# Patient Record
Sex: MALE | Race: WHITE | NOT HISPANIC OR LATINO | URBAN - METROPOLITAN AREA
[De-identification: names, ages, dates, MRNs, and addresses within clinical notes are randomized per-mention and may not be internally consistent; named-entity substitution may affect disease eponyms.]

---

## 2018-04-26 ENCOUNTER — INPATIENT (INPATIENT)
Facility: HOSPITAL | Age: 78
LOS: 7 days | Discharge: HOME | DRG: 377 | End: 2018-05-04
Attending: INTERNAL MEDICINE | Admitting: INTERNAL MEDICINE
Payer: MEDICARE

## 2018-04-26 VITALS
OXYGEN SATURATION: 98 % | DIASTOLIC BLOOD PRESSURE: 63 MMHG | HEART RATE: 72 BPM | SYSTOLIC BLOOD PRESSURE: 125 MMHG | TEMPERATURE: 98 F | RESPIRATION RATE: 20 BRPM | HEIGHT: 72 IN | WEIGHT: 160.06 LBS

## 2018-04-26 DIAGNOSIS — Z86.69 PERSONAL HISTORY OF OTHER DISEASES OF THE NERVOUS SYSTEM AND SENSE ORGANS: Chronic | ICD-10-CM

## 2018-04-26 DIAGNOSIS — Z98.890 OTHER SPECIFIED POSTPROCEDURAL STATES: Chronic | ICD-10-CM

## 2018-04-26 DIAGNOSIS — D64.9 ANEMIA, UNSPECIFIED: ICD-10-CM

## 2018-04-26 DIAGNOSIS — Z95.1 PRESENCE OF AORTOCORONARY BYPASS GRAFT: Chronic | ICD-10-CM

## 2018-04-26 DIAGNOSIS — I50.9 HEART FAILURE, UNSPECIFIED: ICD-10-CM

## 2018-04-26 DIAGNOSIS — I48.91 UNSPECIFIED ATRIAL FIBRILLATION: ICD-10-CM

## 2018-04-26 LAB
ALBUMIN SERPL ELPH-MCNC: 3.6 G/DL — SIGNIFICANT CHANGE UP (ref 3.3–5)
ALP SERPL-CCNC: 171 U/L — HIGH (ref 40–120)
ALT FLD-CCNC: 77 U/L — HIGH (ref 10–45)
ANION GAP SERPL CALC-SCNC: 12 MMOL/L — SIGNIFICANT CHANGE UP (ref 5–17)
APTT BLD: 35.5 SEC — SIGNIFICANT CHANGE UP (ref 27.5–37.4)
AST SERPL-CCNC: 49 U/L — HIGH (ref 10–40)
BASOPHILS NFR BLD AUTO: 0.1 % — SIGNIFICANT CHANGE UP (ref 0–2)
BILIRUB SERPL-MCNC: 0.7 MG/DL — SIGNIFICANT CHANGE UP (ref 0.2–1.2)
BUN SERPL-MCNC: 27 MG/DL — HIGH (ref 7–23)
CALCIUM SERPL-MCNC: 8.9 MG/DL — SIGNIFICANT CHANGE UP (ref 8.4–10.5)
CHLORIDE SERPL-SCNC: 102 MMOL/L — SIGNIFICANT CHANGE UP (ref 96–108)
CK MB CFR SERPL CALC: 5.5 NG/ML — SIGNIFICANT CHANGE UP (ref 0–6.7)
CK SERPL-CCNC: 114 U/L — SIGNIFICANT CHANGE UP (ref 30–200)
CO2 SERPL-SCNC: 25 MMOL/L — SIGNIFICANT CHANGE UP (ref 22–31)
CREAT SERPL-MCNC: 0.89 MG/DL — SIGNIFICANT CHANGE UP (ref 0.5–1.3)
EOSINOPHIL NFR BLD AUTO: 0.1 % — SIGNIFICANT CHANGE UP (ref 0–6)
GLUCOSE SERPL-MCNC: 112 MG/DL — HIGH (ref 70–99)
HCT VFR BLD CALC: 22.7 % — LOW (ref 39–50)
HGB BLD-MCNC: 6.8 G/DL — CRITICAL LOW (ref 13–17)
INR BLD: 2.07 — HIGH (ref 0.88–1.16)
LYMPHOCYTES # BLD AUTO: 11.2 % — LOW (ref 13–44)
MCHC RBC-ENTMCNC: 30 G/DL — LOW (ref 32–36)
MCHC RBC-ENTMCNC: 32.5 PG — SIGNIFICANT CHANGE UP (ref 27–34)
MCV RBC AUTO: 108.6 FL — HIGH (ref 80–100)
MONOCYTES NFR BLD AUTO: 9.6 % — SIGNIFICANT CHANGE UP (ref 2–14)
NEUTROPHILS NFR BLD AUTO: 79 % — HIGH (ref 43–77)
NT-PROBNP SERPL-SCNC: 2579 PG/ML — HIGH (ref 0–300)
PLATELET # BLD AUTO: 354 K/UL — SIGNIFICANT CHANGE UP (ref 150–400)
POTASSIUM SERPL-MCNC: 4.2 MMOL/L — SIGNIFICANT CHANGE UP (ref 3.5–5.3)
POTASSIUM SERPL-SCNC: 4.2 MMOL/L — SIGNIFICANT CHANGE UP (ref 3.5–5.3)
PROT SERPL-MCNC: 6 G/DL — SIGNIFICANT CHANGE UP (ref 6–8.3)
PROTHROM AB SERPL-ACNC: 23.3 SEC — HIGH (ref 9.8–12.7)
RBC # BLD: 2.09 M/UL — LOW (ref 4.2–5.8)
RBC # FLD: 22.4 % — HIGH (ref 10.3–16.9)
RH IG SCN BLD-IMP: POSITIVE — SIGNIFICANT CHANGE UP
SODIUM SERPL-SCNC: 139 MMOL/L — SIGNIFICANT CHANGE UP (ref 135–145)
TROPONIN T SERPL-MCNC: 0.02 NG/ML — HIGH (ref 0–0.01)
WBC # BLD: 14.8 K/UL — HIGH (ref 3.8–10.5)
WBC # FLD AUTO: 14.8 K/UL — HIGH (ref 3.8–10.5)

## 2018-04-26 PROCEDURE — 93010 ELECTROCARDIOGRAM REPORT: CPT

## 2018-04-26 PROCEDURE — 99223 1ST HOSP IP/OBS HIGH 75: CPT | Mod: AI

## 2018-04-26 PROCEDURE — 71045 X-RAY EXAM CHEST 1 VIEW: CPT | Mod: 26

## 2018-04-26 PROCEDURE — 99291 CRITICAL CARE FIRST HOUR: CPT

## 2018-04-26 RX ORDER — ATORVASTATIN CALCIUM 80 MG/1
80 TABLET, FILM COATED ORAL AT BEDTIME
Qty: 0 | Refills: 0 | Status: DISCONTINUED | OUTPATIENT
Start: 2018-04-26 | End: 2018-04-30

## 2018-04-26 RX ORDER — FUROSEMIDE 40 MG
40 TABLET ORAL ONCE
Qty: 0 | Refills: 0 | Status: COMPLETED | OUTPATIENT
Start: 2018-04-26 | End: 2018-04-26

## 2018-04-26 RX ORDER — CHOLECALCIFEROL (VITAMIN D3) 125 MCG
1000 CAPSULE ORAL DAILY
Qty: 0 | Refills: 0 | Status: DISCONTINUED | OUTPATIENT
Start: 2018-04-26 | End: 2018-05-04

## 2018-04-26 RX ORDER — RIVAROXABAN 15 MG-20MG
1 KIT ORAL
Qty: 0 | Refills: 0 | COMMUNITY

## 2018-04-26 RX ORDER — TRAVOPROST 0.04 MG/ML
1 SOLUTION/ DROPS OPHTHALMIC
Qty: 0 | Refills: 0 | COMMUNITY

## 2018-04-26 RX ORDER — PANTOPRAZOLE SODIUM 20 MG/1
40 TABLET, DELAYED RELEASE ORAL
Qty: 0 | Refills: 0 | Status: DISCONTINUED | OUTPATIENT
Start: 2018-04-26 | End: 2018-05-04

## 2018-04-26 RX ORDER — METOPROLOL TARTRATE 50 MG
50 TABLET ORAL DAILY
Qty: 0 | Refills: 0 | Status: DISCONTINUED | OUTPATIENT
Start: 2018-04-26 | End: 2018-05-03

## 2018-04-26 RX ORDER — FUROSEMIDE 40 MG
40 TABLET ORAL ONCE
Qty: 0 | Refills: 0 | Status: DISCONTINUED | OUTPATIENT
Start: 2018-04-26 | End: 2018-04-26

## 2018-04-26 RX ORDER — FUROSEMIDE 40 MG
40 TABLET ORAL DAILY
Qty: 0 | Refills: 0 | Status: DISCONTINUED | OUTPATIENT
Start: 2018-04-27 | End: 2018-04-28

## 2018-04-26 RX ORDER — AMIODARONE HYDROCHLORIDE 400 MG/1
200 TABLET ORAL DAILY
Qty: 0 | Refills: 0 | Status: DISCONTINUED | OUTPATIENT
Start: 2018-04-26 | End: 2018-05-04

## 2018-04-26 RX ORDER — TIMOLOL 0.5 %
1 DROPS OPHTHALMIC (EYE)
Qty: 0 | Refills: 0 | COMMUNITY

## 2018-04-26 RX ORDER — LATANOPROST 0.05 MG/ML
1 SOLUTION/ DROPS OPHTHALMIC; TOPICAL AT BEDTIME
Qty: 0 | Refills: 0 | Status: DISCONTINUED | OUTPATIENT
Start: 2018-04-26 | End: 2018-05-04

## 2018-04-26 RX ORDER — TIMOLOL 0.5 %
1 DROPS OPHTHALMIC (EYE)
Qty: 0 | Refills: 0 | Status: DISCONTINUED | OUTPATIENT
Start: 2018-04-26 | End: 2018-05-04

## 2018-04-26 RX ORDER — PREGABALIN 225 MG/1
1000 CAPSULE ORAL DAILY
Qty: 0 | Refills: 0 | Status: DISCONTINUED | OUTPATIENT
Start: 2018-04-26 | End: 2018-05-04

## 2018-04-26 RX ADMIN — Medication 40 MILLIGRAM(S): at 22:47

## 2018-04-26 RX ADMIN — LATANOPROST 1 DROP(S): 0.05 SOLUTION/ DROPS OPHTHALMIC; TOPICAL at 21:49

## 2018-04-26 RX ADMIN — Medication 40 MILLIGRAM(S): at 16:45

## 2018-04-26 RX ADMIN — ATORVASTATIN CALCIUM 80 MILLIGRAM(S): 80 TABLET, FILM COATED ORAL at 21:42

## 2018-04-26 RX ADMIN — PANTOPRAZOLE SODIUM 40 MILLIGRAM(S): 20 TABLET, DELAYED RELEASE ORAL at 21:42

## 2018-04-26 RX ADMIN — Medication 1 DROP(S): at 21:42

## 2018-04-26 NOTE — ED PROVIDER NOTE - PMH
Atrial fibrillation and flutter    CAD (coronary artery disease)    Detached retina, right    History of lung cancer    Myasthenia gravis

## 2018-04-26 NOTE — H&P ADULT - NSHPLABSRESULTS_GEN_ALL_CORE
6.8    14.8  )-----------( 354      ( 26 Apr 2018 15:34 )             22.7  04-26    139  |  102  |  27<H>  ----------------------------<  112<H>  4.2   |  25  |  0.89    Ca    8.9      26 Apr 2018 15:34    TPro  6.0  /  Alb  3.6  /  TBili  0.7  /  DBili  x   /  AST  49<H>  /  ALT  77<H>  /  AlkPhos  171<H>  04-26

## 2018-04-26 NOTE — H&P ADULT - PROBLEM SELECTOR PLAN 3
rate controlled  holding xarelto given severe anemia  and need for w/up  continue BB with holding parameters to hold for SBP <110   EP to see in am   initial plan was for possible ablation but in setting of severe anemia will need w/up first

## 2018-04-26 NOTE — H&P ADULT - HISTORY OF PRESENT ILLNESS
78 yo M with history of CAD, myasthenia gravis, history of atrial fibrillation on Xarelto and Amiodarone, s/p CABG at [facility] 3/27/18 ( LIMA> LAD, ALY>RI, SVG>PDA), resent admission on 4/10/18 for SOB, was sent to ED form his PMD office with newly diagnosed atrial flutter.  Patient states that for the past 3 week he has been experiencing SOB, fatigue and decreased exercise tolerance.   Patient states that he has been taking his medication Xarelto and amiodarone without any issues.

## 2018-04-26 NOTE — ED ADULT NURSE REASSESSMENT NOTE - NS ED NURSE REASSESS COMMENT FT1
Pt transported to [facility] without incident, bedside handoff to RN Sarah, no s/s of transfusion reaction noted.

## 2018-04-26 NOTE — ED ADULT TRIAGE NOTE - CHIEF COMPLAINT QUOTE
patient BIBA from physicians office. s/p triple bypass 3 weeks ago. complains fo SOB for the last 3 weeks. patient sent for possible cardioversion

## 2018-04-26 NOTE — PATIENT PROFILE ADULT. - FUNCTIONAL SCREEN CURRENT LEVEL: AMBULATION, MLM
+ fever, + chills, no change in vision, no change in hearing, no chest pain, no shortness of breath, + abdominal pain, no vomiting, no dysuria, no muscle pain, no rashes, no loss of consciousness. ~ Javi Powell MD
(2) assistive person

## 2018-04-26 NOTE — H&P ADULT - PROBLEM SELECTOR PLAN 2
F/UP echo here  s/p recent 3vCABG 3/27/18 , CP free  continue BB and statin but hold asa given severe anemia  pre-CABG and post CABG EF 60-65%, most recent echo 4/11/18 as outpt 43%  lasix 40mg IV daily  and in between of RBC tx units, reassess dose lasix in am

## 2018-04-26 NOTE — H&P ADULT - ASSESSMENT
76 y/o man with hx lung Ca s/p RML resection (several years ago, [place] [facility] [place] as per patient),  s/p recent CABG at [facility] 3/27/18 (LIMA-LAD, ALY-RI, SVG-PDA) with uneventful course, since surgery has been with progressive dyspnea, generalized weakness and LE edema - had  recent admission to [facility] for above with CTA chest negative for PE, echo with EF 60-65%,  treated with lasix, discharged on 4/9/18 with H/H at that time  9.2/28.6.  On 4/10/18 noted to be A fib and was started on xarelto and amiodarone.  Today  sent from cardiologist office to [facility] for A flutter and worsened LE edema and dyspnea, found to be severely anemic with Hgb  6.2/22.7, VSS with /59,. HR 60-70's, RR 18, O2 sat 98% RA,  EKG with A flutter VR 64, troponin 0.02, BNP  2,579  and fluid overloaded on exam with mostly LE edema.  In ED treated with Lasix 40mg IVP x 1 with good UO and started RBC blood transfusion ( ordered for two units  with IV lasix in between).  Patient is now admitted to 5U telemetry for further w/up and management including blood transfusion, ECHO/BRIAN, GI eval and EP eval with possible CTS eval (chest CT ordered to R/O bleed). 78 y/o man with hx lung Ca s/p RML [place] ago, [place] [facility] [place]  s/p recent CABG at [facility] 3/27/18 (LIMA-LAD, ALY-RI, SVG-PDA) with uneventful course, since surgery has been with progressive dyspnea, generalized weakness and LE edema - had  recent admission to [facility] for above with CTA chest negative for PE, echo with EF 60-65%,  treated with lasix, discharged on 4/9/18 with H/H at that time  9.2/28.6.  On 4/10/18 noted to be A fib and was started on xarelto and amiodarone.  Today  sent from cardiologist office to [facility] ED for A flutter and worsened LE edema and dyspnea, found to be severely anemic with Hgb  6.2/22.7, VSS with /59,. HR 60-70's, RR 18, O2 sat 98% RA,  EKG with A flutter VR 64, troponin 0.02, BNP  2,579  and fluid overloaded on exam with mostly LE edema.  In ED treated with Lasix 40mg IVP x 1 with good UO and started RBC blood transfusion ( ordered for two units  with IV lasix in between).  Patient is now admitted to 5U telemetry for further w/up and management including blood transfusion, ECHO/BRIAN, GI eval and EP eval with possible CTS eval (chest CT ordered to R/O bleed).  At present remains HD stable. 78 y/o man with hx lung Ca s/p RML resection (several years ago, followed at [facility], in remission as per patient),  s/p recent CABG at [facility] 3/27/18 (LIMA-LAD, ALY-RI, SVG-PDA) with uneventful course, since surgery has been with progressive dyspnea, generalized weakness and LE edema - had  recent admission to [facility] for above with CTA chest negative for PE, echo with EF 60-65%,  treated with lasix, discharged on 4/9/18 with H/H at that time  9.2/28.6.  On 4/10/18 noted to be A fib and was started on xarelto and amiodarone.  Today  sent from cardiologist office to [facility] ED for A flutter and worsened LE edema and dyspnea, found to be severely anemic with Hgb  6.2/22.7, VSS with /59,. HR 60-70's, RR 18, O2 sat 98% RA,  EKG with A flutter VR 64, troponin 0.02, BNP  2,579  and fluid overloaded on exam with mostly LE edema.  In ED treated with Lasix 40mg IVP x 1 with good UO and started RBC blood transfusion ( ordered for two units  with IV lasix in between).  Patient is now admitted to 5U telemetry for further w/up and management including blood transfusion, ECHO/BRIAN, GI eval and EP eval with possible CTS eval (chest CT ordered to R/O bleed).  At present remains HD stable.

## 2018-04-26 NOTE — H&P ADULT - HISTORY OF PRESENT ILLNESS
76 y/o man with hx lung Ca s/p RML resection (several years ago, followed at [facility] [place], in remission as per patient), hx myasthenia gravis with eye weakness, s/p recent CABG at [facility] 3/27/18 (ALY-LAD, LIMA-RI, SVG-PDA) with uneventful course, since surgery has been with progressive dyspnea, generalized weakness and LE edema - had  recent admission to [facility] for above with CTA chest negative for PE, echo with EF 60-65%, treated with lasix, discharged on 4/9/18 with H/H at that time  9.2/28.6.  On 4/10/18 noted to be A fib and was startedon xarelto and amiodarone.  Today  sent from cardiologist office to [facility] for A flutter and worsened LE edema and dyspnea 78 y/o man with hx lung Ca s/p RML resection (several years ago, followed at [facility] [place], in remission as per patient),  s/p recent CABG at [facility] 3/27/18 (LIMA-LAD, ALY-RI, SVG-PDA) with uneventful course, since surgery has been with progressive dyspnea, generalized weakness and LE edema - had  recent admission to [facility] for above with CTA chest negative for PE, echo with EF 60-65%,  treated with lasix, discharged on 4/9/18 with H/H at that time  9.2/28.6.  On 4/10/18 noted to be A fib and was started on xarelto and amiodarone.  Today  sent from cardiologist office to [facility] for A flutter and worsened LE edema and dyspnea, found to be severely anemic with Hgb  6.2/22.7 76 y/o man with hx lung Ca s/p RML resection (several years ago, followed at [facility] [place], in remission as per patient),  s/p recent CABG at [facility] 3/27/18 (LIMA-LAD, ALY-RI, SVG-PDA) with uneventful course, since surgery has been with progressive dyspnea, generalized weakness and LE edema - had  recent admission to [facility] for above with CTA chest negative for PE, echo with EF 60-65%,  treated with lasix, discharged on 4/9/18 with H/H at that time  9.2/28.6.  On 4/10/18 noted to be A fib and was started on xarelto and amiodarone.  Today  sent from cardiologist office to [facility] for A flutter and worsened LE edema and dyspnea, found to be severely anemic with Hgb  6.2/22.7, VSS with /59,. HR 60-70's, RR 18, O2 sat 98% RA,  EKG with A flutter VR 64, troponin 0.02, BNP  2,579  and fluid overloaded on exam with mostly LE edema.  In ED treated with Lasix 40mg IVP x 1 with good UO and started RBC blood transfusion ( ordered for two units  with IV lasix in between).  Patient is now admitted to 5U telemetry for further w/up and management including blood transfusion, ECHO/BRIAN, GI eval and EP eval with possible CTS eval (chest CT ordered to R/O bleed).     As per patient since CABG with progressive SOB, LE edema, decreased ET  and palpitations.  Denies melena, RBPR, hematochezia, hematemesis.  No prior hx GIB, last colonoscopy with poor prep 5 years ago, prior to that unremarkable as per patient.  Denies any hx A fib prior to CABG and states was started on xarelto after discharge from hospital. 76 y/o man with hx lung Ca s/p RML resection (several [place] ago, [place] [facility] [place]),  s/p recent CABG at [facility] 3/27/18 (LIMA-LAD, ALY-RI, SVG-PDA) with uneventful course, since surgery has been with progressive dyspnea, generalized weakness and LE edema - had  recent admission to [facility] for above with CTA chest negative for PE, echo with EF 60-65%,  treated with lasix, discharged on 4/9/18 with H/H at that time  9.2/28.6.  On 4/10/18 noted to be A fib and was started on xarelto and amiodarone.  Today  sent from cardiologist office to [facility] for A flutter and worsened LE edema and dyspnea, found to be severely anemic with Hgb  6.2/22.7, VSS with /59,. HR 60-70's, RR 18, O2 sat 98% RA,  EKG with A flutter VR 64, troponin 0.02, BNP  2,579  and fluid overloaded on exam with mostly LE edema.  In ED treated with Lasix 40mg IVP x 1 with good UO and started RBC blood transfusion ( ordered for two units  with IV lasix in between).  Patient is now admitted to 5U telemetry for further w/up and management including blood transfusion, ECHO/BRIAN, GI eval and EP eval with possible CTS eval (chest CT ordered to R/O bleed).     As per patient since CABG with progressive SOB, LE edema, decreased ET  and palpitations.  Denies melena, RBPR, hematochezia, hematemesis.  No prior hx GIB, last colonoscopy with poor prep 5 years ago, prior to that unremarkable as per patient.  Denies any hx A fib prior to CABG and states was started on xarelto after discharge from hospital.  Pre-CABG and post CABG ECHO with EF 60-65%, most recent outpt echo  4/11/18 with EF 43%. 76 y/o man with hx lung Ca s/p RML resection [place] ago, [place] [facility] [place] as [place]),  s/p recent CABG at [facility] 3/27/18 (LIMA-LAD, ALY-RI, SVG-PDA) with uneventful course, since surgery has been with progressive dyspnea, generalized weakness and LE edema - had  recent admission to [facility] for above with CTA chest negative for PE, echo with EF 60-65%,  treated with lasix, discharged on 4/9/18 with H/H at that time  9.2/28.6.  On 4/10/18 noted to be A fib and was started on xarelto and amiodarone.  Today  sent from cardiologist office to [facility] ED for A flutter and worsened LE edema and dyspnea, found to be severely anemic with Hgb  6.2/22.7, VSS with /59,. HR 60-70's, RR 18, O2 sat 98% RA,  EKG with A flutter VR 64, troponin 0.02, BNP  2,579  and fluid overloaded on exam with mostly LE edema.  In ED treated with Lasix 40mg IVP x 1 with good UO and started RBC blood transfusion ( ordered for two units  with IV lasix in between).  Patient is now admitted to 5U telemetry for further w/up and management including blood transfusion, ECHO/BRIAN, GI eval and EP eval with possible CTS eval (chest CT ordered to R/O bleed).     As per patient since CABG with progressive SOB, LE edema, decreased ET  and palpitations.  Denies melena, RBPR, hematochezia, hematemesis.  No prior hx GIB, last colonoscopy with poor prep 5 years ago, prior to that unremarkable as per patient.  Denies any hx A fib prior to CABG and states was started on xarelto after discharge from hospital.  Pre-CABG and post CABG ECHO with EF 60-65%, most recent outpt echo  4/11/18 with EF 43%. 78 y/o man with hx lung Ca s/p RML resection (several years ago, followed at [facility], in remission as per patient),  s/p recent CABG at [facility] 3/27/18 (LIMA-LAD, ALY-RI, SVG-PDA) with uneventful course, since surgery has been with progressive dyspnea, generalized weakness and LE edema - had  recent admission to [facility] for above with CTA chest negative for PE, echo with EF 60-65%,  treated with lasix, discharged on 4/9/18 with H/H at that time  9.2/28.6.  On 4/10/18 noted to be A fib and was started on xarelto and amiodarone.  Today  sent from cardiologist office to [facility] ED for A flutter and worsened LE edema and dyspnea, found to be severely anemic with Hgb  6.2/22.7, VSS with /59,. HR 60-70's, RR 18, O2 sat 98% RA,  EKG with A flutter VR 64, troponin 0.02, BNP  2,579  and fluid overloaded on exam with mostly LE edema.  In ED treated with Lasix 40mg IVP x 1 with good UO and started RBC blood transfusion ( ordered for two units  with IV lasix in between).  Patient is now admitted to 5U telemetry for further w/up and management including blood transfusion, ECHO/BRIAN, GI eval and EP eval with possible CTS eval (chest CT ordered to R/O bleed).     As per patient since CABG with progressive SOB, LE edema, decreased ET  and palpitations.  Denies melena, RBPR, hematochezia, hematemesis.  No prior hx GIB, last colonoscopy with poor prep 5 years ago, prior to that unremarkable as per patient.  Denies any hx A fib prior to CABG and states was started on xarelto after discharge from hospital.  Pre-CABG and post CABG ECHO with EF 60-65%, most recent outpt echo  4/11/18 with EF 43%.

## 2018-04-26 NOTE — ED ADULT NURSE REASSESSMENT NOTE - NS ED NURSE REASSESS COMMENT FT1
SKYLER Hardy at bedside. Pt educated to report what s/s of blood transfusion reaction to report to nursing staff, he verbalized understanding.

## 2018-04-26 NOTE — ED PROVIDER NOTE - MEDICAL DECISION MAKING DETAILS
78 y/o male s/p CABG presents for SOB and CP. 1) elevated BNP, LE edema given Lasix 40 mg IV, normal EF based on report from [facility] 2) A barbara, pt on Xeralto, plan is for BRIAN 3) Pt found to be anemic- will order transfusion of PRBCs in the ED. Discussed with cardiology team 78 y/o male s/p CABG presents for SOB and CP. 1) elevated BNP, LE edema given Lasix 40 mg IV, normal EF based on report from [facility] 2) A flutter, pt on Xeralto, plan is for BRIAN 3) Pt found to be anemic- will order transfusion of PRBCs in the ED. Discussed with cardiology team to hold Xaralto, GI consult

## 2018-04-26 NOTE — H&P ADULT - ATTENDING COMMENTS
Assessment: Patient personally seen and examined myself during rounds with the Physician Assistant/House Staff/Nurse Practitioner   on 4/26/18  Physician Assistant/House Staff/Nurse Practitioner note read, including vitals, physical findings, laboratory data, and radiological reports.   Revisions included below.   Direct personal management at bed side and extensive interpretation of the data.    Plan was outlined and discussed in details with the Physician Assistant/House Staff/Nurse Practitioner.    Decision making of high complexity   Risk high of complications, morbidity, and/or mortality  Assessment and Action taken for acute disease activity to reflect the level of care provided:  -Hemodynamic evaluation and support - severely anemic needs more prbc   -ACS assessment and treatment as applicable   -Heart failure assessment and treatment as applicable  -Cardiac Telemetry reviewed - atrial flutter  -Medication reconciliation  -Review laboratory data - anemia  -EKG reviewed  aflutter  plan for transfusion gi consult and ablation/dccv per ep  -Interdisciplinary discussion with IC / EP / HF / CTS teams as needed  TIME SPENT in evaluation and management, reassessments, review and interpretation of labs and x-rays, and hemodynamic management, formulating a plan and coordinating care: ___70____ MIN.  Time does not include procedural time.

## 2018-04-26 NOTE — H&P ADULT - PSH
H/O cataract    History of lung surgery    S/P CABG x 3
H/O cataract    History of lung surgery    S/P CABG x 3

## 2018-04-26 NOTE — H&P ADULT - PMH
Atrial fibrillation and flutter    CAD (coronary artery disease)    Detached retina, right    History of lung cancer    Myasthenia gravis
Atrial fibrillation and flutter    CAD (coronary artery disease)    Detached retina, right    History of lung cancer    Myasthenia gravis

## 2018-04-26 NOTE — H&P ADULT - NSHPPHYSICALEXAM_GEN_ALL_CORE
127/59  HR 70's  RR 18 O2 sat 98% RA  NAD  appears pale and chronically ill    Neck: No JVD  Chest ; bibasilar cracles  Cor S1 S2 irreg irreg, no murmur appreciated  Abd: soft, NT, ND  Ext: LE 2-3+ pitting edema both LE  DP/PT 1+ b/l   Neuro: A+O x 3, fluent speech, non focal

## 2018-04-26 NOTE — ED ADULT NURSE REASSESSMENT NOTE - NS ED NURSE REASSESS COMMENT FT1
Lab results noted. Second IV access placed, Type and screen collected, pt consented for blood transfusion by Dr Alberto.

## 2018-04-26 NOTE — H&P ADULT - PROBLEM SELECTOR PLAN 1
HOLD ASA and xarelto  unclear source at present, stool guaic in ED weakly +  F/UP serial occult stools on floor  GI eval in am   started on empiric PPI BID   F/UP chest CT to r/o internal post op bleed on xarelto

## 2018-04-26 NOTE — ED PROVIDER NOTE - CRITICAL CARE PROVIDED
documentation/consultation with other physicians/additional history taking/direct patient care (not related to procedure)/interpretation of diagnostic studies

## 2018-04-26 NOTE — ED PROVIDER NOTE - OBJECTIVE STATEMENT
78 y/o male with hx paroxysmal a-fib , s/p 3 weeks ago cardiac bypass, with SOB and CP since surgery. Today in cardiologist office found to be in a-flutter. Patient st. also that he has been feeling tired and exhausted. No fever. St increase in LE edema

## 2018-04-26 NOTE — ED ADULT NURSE NOTE - OBJECTIVE STATEMENT
Pt w hx of AFib, s/p coronary bypass three weeks ago at [facility] referred to ED by his cardiologist for a possible cardioversion. Pt c/o GOODWIN, fatigue and B/L LE swelling. He denies chest pain or SOB at rest, surg incision CDI, pt denies any drainage, fevers or chills. Pt placed on telemetry, ekg obtained. IV access established, labs sent.

## 2018-04-27 DIAGNOSIS — I50.31 ACUTE DIASTOLIC (CONGESTIVE) HEART FAILURE: ICD-10-CM

## 2018-04-27 DIAGNOSIS — I25.10 ATHEROSCLEROTIC HEART DISEASE OF NATIVE CORONARY ARTERY WITHOUT ANGINA PECTORIS: ICD-10-CM

## 2018-04-27 DIAGNOSIS — D72.829 ELEVATED WHITE BLOOD CELL COUNT, UNSPECIFIED: ICD-10-CM

## 2018-04-27 DIAGNOSIS — K92.1 MELENA: ICD-10-CM

## 2018-04-27 DIAGNOSIS — R74.8 ABNORMAL LEVELS OF OTHER SERUM ENZYMES: ICD-10-CM

## 2018-04-27 DIAGNOSIS — I10 ESSENTIAL (PRIMARY) HYPERTENSION: ICD-10-CM

## 2018-04-27 DIAGNOSIS — Z01.89 ENCOUNTER FOR OTHER SPECIFIED SPECIAL EXAMINATIONS: ICD-10-CM

## 2018-04-27 LAB
ANION GAP SERPL CALC-SCNC: 11 MMOL/L — SIGNIFICANT CHANGE UP (ref 5–17)
APPEARANCE UR: CLEAR — SIGNIFICANT CHANGE UP
APTT BLD: 32.2 SEC — SIGNIFICANT CHANGE UP (ref 27.5–37.4)
BILIRUB UR-MCNC: NEGATIVE — SIGNIFICANT CHANGE UP
BUN SERPL-MCNC: 28 MG/DL — HIGH (ref 7–23)
CALCIUM SERPL-MCNC: 8.8 MG/DL — SIGNIFICANT CHANGE UP (ref 8.4–10.5)
CHLORIDE SERPL-SCNC: 99 MMOL/L — SIGNIFICANT CHANGE UP (ref 96–108)
CO2 SERPL-SCNC: 29 MMOL/L — SIGNIFICANT CHANGE UP (ref 22–31)
COLOR SPEC: YELLOW — SIGNIFICANT CHANGE UP
CREAT SERPL-MCNC: 1.05 MG/DL — SIGNIFICANT CHANGE UP (ref 0.5–1.3)
DIFF PNL FLD: (no result)
GLUCOSE SERPL-MCNC: 109 MG/DL — HIGH (ref 70–99)
GLUCOSE UR QL: NEGATIVE — SIGNIFICANT CHANGE UP
HCT VFR BLD CALC: 26.3 % — LOW (ref 39–50)
HCT VFR BLD CALC: 26.6 % — LOW (ref 39–50)
HCT VFR BLD CALC: 28.4 % — LOW (ref 39–50)
HGB BLD-MCNC: 8.5 G/DL — LOW (ref 13–17)
HGB BLD-MCNC: 8.5 G/DL — LOW (ref 13–17)
HGB BLD-MCNC: 9 G/DL — LOW (ref 13–17)
INR BLD: 1.49 — HIGH (ref 0.88–1.16)
INR BLD: 1.65 — HIGH (ref 0.88–1.16)
IRON SATN MFR SERPL: 18 % — SIGNIFICANT CHANGE UP (ref 16–55)
IRON SATN MFR SERPL: 59 UG/DL — SIGNIFICANT CHANGE UP (ref 45–165)
KETONES UR-MCNC: NEGATIVE — SIGNIFICANT CHANGE UP
LACTATE SERPL-SCNC: 1.4 MMOL/L — SIGNIFICANT CHANGE UP (ref 0.5–2)
LEUKOCYTE ESTERASE UR-ACNC: NEGATIVE — SIGNIFICANT CHANGE UP
MAGNESIUM SERPL-MCNC: 2.2 MG/DL — SIGNIFICANT CHANGE UP (ref 1.6–2.6)
MCHC RBC-ENTMCNC: 30.6 PG — SIGNIFICANT CHANGE UP (ref 27–34)
MCHC RBC-ENTMCNC: 31 PG — SIGNIFICANT CHANGE UP (ref 27–34)
MCHC RBC-ENTMCNC: 31.3 PG — SIGNIFICANT CHANGE UP (ref 27–34)
MCHC RBC-ENTMCNC: 31.7 G/DL — LOW (ref 32–36)
MCHC RBC-ENTMCNC: 32 G/DL — SIGNIFICANT CHANGE UP (ref 32–36)
MCHC RBC-ENTMCNC: 32.3 G/DL — SIGNIFICANT CHANGE UP (ref 32–36)
MCV RBC AUTO: 96 FL — SIGNIFICANT CHANGE UP (ref 80–100)
MCV RBC AUTO: 96.6 FL — SIGNIFICANT CHANGE UP (ref 80–100)
MCV RBC AUTO: 97.8 FL — SIGNIFICANT CHANGE UP (ref 80–100)
NITRITE UR-MCNC: POSITIVE
OB PNL STL: POSITIVE
PH UR: 5 — SIGNIFICANT CHANGE UP (ref 5–8)
PLATELET # BLD AUTO: 276 K/UL — SIGNIFICANT CHANGE UP (ref 150–400)
PLATELET # BLD AUTO: 288 K/UL — SIGNIFICANT CHANGE UP (ref 150–400)
PLATELET # BLD AUTO: 289 K/UL — SIGNIFICANT CHANGE UP (ref 150–400)
POTASSIUM SERPL-MCNC: 3.6 MMOL/L — SIGNIFICANT CHANGE UP (ref 3.5–5.3)
POTASSIUM SERPL-SCNC: 3.6 MMOL/L — SIGNIFICANT CHANGE UP (ref 3.5–5.3)
PROT UR-MCNC: NEGATIVE MG/DL — SIGNIFICANT CHANGE UP
PROTHROM AB SERPL-ACNC: 16.7 SEC — HIGH (ref 9.8–12.7)
PROTHROM AB SERPL-ACNC: 18.5 SEC — HIGH (ref 9.8–12.7)
RBC # BLD: 2.72 M/UL — LOW (ref 4.2–5.8)
RBC # BLD: 2.74 M/UL — LOW (ref 4.2–5.8)
RBC # BLD: 2.94 M/UL — LOW (ref 4.2–5.8)
RBC # BLD: 2.94 M/UL — LOW (ref 4.2–5.8)
RBC # FLD: 23.6 % — HIGH (ref 10.3–16.9)
RBC # FLD: 23.6 % — HIGH (ref 10.3–16.9)
RBC # FLD: 23.7 % — HIGH (ref 10.3–16.9)
RETICS/RBC NFR: 13.8 % — HIGH (ref 0.5–2.5)
SODIUM SERPL-SCNC: 139 MMOL/L — SIGNIFICANT CHANGE UP (ref 135–145)
SP GR SPEC: 1.01 — SIGNIFICANT CHANGE UP (ref 1–1.03)
TIBC SERPL-MCNC: 332 UG/DL — SIGNIFICANT CHANGE UP (ref 220–430)
TROPONIN T SERPL-MCNC: 0.03 NG/ML — HIGH (ref 0–0.01)
UIBC SERPL-MCNC: 273 UG/DL — SIGNIFICANT CHANGE UP (ref 110–370)
UROBILINOGEN FLD QL: 0.2 E.U./DL — SIGNIFICANT CHANGE UP
WBC # BLD: 11.3 K/UL — HIGH (ref 3.8–10.5)
WBC # BLD: 11.6 K/UL — HIGH (ref 3.8–10.5)
WBC # BLD: 13 K/UL — HIGH (ref 3.8–10.5)
WBC # FLD AUTO: 11.3 K/UL — HIGH (ref 3.8–10.5)
WBC # FLD AUTO: 11.6 K/UL — HIGH (ref 3.8–10.5)
WBC # FLD AUTO: 13 K/UL — HIGH (ref 3.8–10.5)

## 2018-04-27 PROCEDURE — 93306 TTE W/DOPPLER COMPLETE: CPT | Mod: 26

## 2018-04-27 PROCEDURE — 93970 EXTREMITY STUDY: CPT | Mod: 26

## 2018-04-27 PROCEDURE — 99233 SBSQ HOSP IP/OBS HIGH 50: CPT

## 2018-04-27 PROCEDURE — 71250 CT THORAX DX C-: CPT | Mod: 26

## 2018-04-27 PROCEDURE — 99222 1ST HOSP IP/OBS MODERATE 55: CPT

## 2018-04-27 RX ORDER — POTASSIUM CHLORIDE 20 MEQ
40 PACKET (EA) ORAL ONCE
Qty: 0 | Refills: 0 | Status: COMPLETED | OUTPATIENT
Start: 2018-04-27 | End: 2018-04-27

## 2018-04-27 RX ORDER — SOD SULF/SODIUM/NAHCO3/KCL/PEG
4000 SOLUTION, RECONSTITUTED, ORAL ORAL ONCE
Qty: 0 | Refills: 0 | Status: COMPLETED | OUTPATIENT
Start: 2018-04-29 | End: 2018-04-29

## 2018-04-27 RX ADMIN — PANTOPRAZOLE SODIUM 40 MILLIGRAM(S): 20 TABLET, DELAYED RELEASE ORAL at 05:45

## 2018-04-27 RX ADMIN — AMIODARONE HYDROCHLORIDE 200 MILLIGRAM(S): 400 TABLET ORAL at 05:45

## 2018-04-27 RX ADMIN — Medication 1000 UNIT(S): at 12:34

## 2018-04-27 RX ADMIN — Medication 1 DROP(S): at 19:08

## 2018-04-27 RX ADMIN — Medication 40 MILLIGRAM(S): at 05:45

## 2018-04-27 RX ADMIN — Medication 50 MILLIGRAM(S): at 05:45

## 2018-04-27 RX ADMIN — PANTOPRAZOLE SODIUM 40 MILLIGRAM(S): 20 TABLET, DELAYED RELEASE ORAL at 19:03

## 2018-04-27 RX ADMIN — ATORVASTATIN CALCIUM 80 MILLIGRAM(S): 80 TABLET, FILM COATED ORAL at 21:39

## 2018-04-27 RX ADMIN — PREGABALIN 1000 MICROGRAM(S): 225 CAPSULE ORAL at 12:34

## 2018-04-27 RX ADMIN — Medication 40 MILLIEQUIVALENT(S): at 12:33

## 2018-04-27 NOTE — PROGRESS NOTE ADULT - SUBJECTIVE AND OBJECTIVE BOX
PA EVENT NOTE  EGD canceled this evening by anesthesiologist Dr. Diaz without consultation with cardiologist Dr. Bonilla. Cardiologist clearance document in chart pre op at 13:30. Dr. Bonilla made multiple attempts to contact anesthesiologist without return. Case discussed with GI fellow but not GI attending. Patient remains cardiac cleared for EGD/Colonoscopy as risk of bleeding outweighs low risk of MAC due to low RCRI and CHAVEZ score.

## 2018-04-27 NOTE — CONSULT NOTE ADULT - PROBLEM SELECTOR RECOMMENDATION 2
normal LFTs in the past, likely in setting of recent Lipitor use vs chronic alcohol use. So sign of biliary obstruction.   Advised alcohol cessation.  Consider lower Lipitor from 80mg to 40mg due advanced age and if appropriate for his cardiac risk reduction.  Recheck LFTs. normal LFTs in the past, likely in setting of recent Lipitor use vs chronic alcohol use. No sign of biliary obstruction.   Advised alcohol cessation.  Consider lower Lipitor from 80mg to 40mg due advanced age and if appropriate for his cardiac risk reduction.  Recheck LFTs.  If LFTs persistently elevated, may consider further CLD work-up.

## 2018-04-27 NOTE — PROGRESS NOTE ADULT - PROBLEM SELECTOR PLAN 3
rate controlled  holding xarelto given severe anemia  and need for w/up  continue BB with holding parameters to hold for SBP <110   EP to see in am   initial plan was for possible ablation but in setting of severe anemia will need w/up first -EP team following   -New onset Atrial Flutter diagnosed 4/11/2018 as an outpatient. Was started on Xarelto and Amiodarone at that time.  -Holding Xarelto in the setting of acute anemia  -Currently rate controlled: Will continue Amiodarone 200 mg daily and Toprol Xl 50 mg daily.   - When patient is cleared for anticoagulation will plan for BRIAN/DCCV before discharge.

## 2018-04-27 NOTE — PROGRESS NOTE ADULT - PROBLEM SELECTOR PLAN 7
-Lives with his wife. reports he is in cardiac rehab.   -PT evaluation ordered    Dispo: EGD today. F/U CBC and Coags @ 6 PM.     Case discussed with EP, GI team and Dr. Bonilla. -Lives with his wife. reports he is in cardiac rehab.   -PT evaluation ordered    Dispo: F/U CBC and Coags @ 6 PM. F/U with GI when EGD and C-Scope will be done.       Case discussed with EP, GI team and Dr. Bonilla. -Lives with his wife. reports he is in cardiac rehab.   -PT evaluation ordered    Dispo: F/U CBC and Coags @ 6 PM. Will need clear liquid diet (Sunday; will need to change order in [place]) and [place] at 6 PM Sunday.       Case discussed with EP, GI team and Dr. Bonilla.

## 2018-04-27 NOTE — PROVIDER CONTACT NOTE (OTHER) - ASSESSMENT
Pt. alert and oriented. Pt. had small, dark brown soft stool. Pt. alert and oriented. Pt. had small, dark brown soft and slightly watery stool.

## 2018-04-27 NOTE — CONSULT NOTE ADULT - PROBLEM SELECTOR RECOMMENDATION 9
Likely upper GI bleed in setting of Xarelto use. Acute drop in hb to 6.9 from 9.2 1 month ago. Elevated BUN.  Macro/normocytic anemia consistent with acute bleed, maybe mixed component of baseline anemia of chronic disease. Reticulocyte index of 6.2 showed appropriate response. Iron panel normal however was drawn after 2 units of pRBC.  Would continue with protonix IV BID. Would warrant EGD, possible C-Scope.  CBC Q12H, transfuse for <8.  Hold off Xarelto and ASA for now.  Advised alcohol cessation.  Will discuss with attending. Likely upper GI bleed in setting of Xarelto use. Acute drop in hb to 6.9 from 9.2 1 month ago. Elevated BUN.  Macro/normocytic anemia consistent with acute bleed, maybe mixed component of baseline anemia of chronic disease. Reticulocyte index of 6.2 showed appropriate response. Iron panel normal however was drawn after 2 units of pRBC.  Would continue with protonix IV BID. Would warrant EGD, possible C-Scope.  CBC Q6-8H, transfuse for <8.  Hold off Xarelto and ASA for now.  Advised alcohol cessation.  Will discuss with attending. Likely upper GI bleed in setting of Xarelto use. Acute drop in hb to 6.9 from 9.2 1 month ago. Elevated BUN.  Macro/normocytic anemia consistent with acute bleed, maybe mixed component of baseline anemia of chronic disease. Reticulocyte index of 6.2 showed appropriate response. Iron panel normal however was drawn after 2 units of pRBC.  Would continue with protonix IV BID. Would warrant inpatient EGD, possible C-Scope.  CBC Q4-6H, transfuse for <8.  Hold off Xarelto and ASA for now.  Patient possibly in CHF exacerbation, would need cardiac clearance prior to EGD.  Advised alcohol cessation.  Will discuss with attending. Likely upper GI bleed in setting of Xarelto use. Acute drop in hb to 6.9 from 9.2 1 month ago. Elevated BUN.  Macro/normocytic anemia consistent with acute bleed, maybe mixed component of baseline anemia of chronic disease. Reticulocyte index of 6.2 showed appropriate response. Iron panel normal however was drawn after 2 units of pRBC.  Would continue with protonix IV BID. Would warrant inpatient EGD, possible C-Scope.  CBC Q4-6H, transfuse for <8.  Trend Coags.  Hold off Xarelto and ASA for now.  Patient possibly in CHF exacerbation, would need cardiac clearance prior to EGD.  Advised alcohol cessation.  Will discuss with attending. Likely upper GI bleed in setting of Xarelto use 2/2 AVM vs PUD vs malignancy vs LGIB. Acute drop in hb to 6.9 from 9.2 1 month ago. Elevated BUN.  Macro/normocytic anemia consistent with acute bleed, maybe mixed component of baseline anemia of chronic disease. Reticulocyte index of 6.2 showed appropriate response. Iron panel normal however was drawn after 2 units of pRBC.  Would continue with protonix IV BID.   Will plan for EGD today pending cardiac optimization  Pt will likely also need a colonoscopy for further evaluation and GI optimization prior to anticoagulation use as his last colonoscopy was with poor prep  CBC Q4-6H, transfuse for <8.  Re-check PT INR, last Xarelto use was evening before admission  Continue to hold Xarelto in the setting of GI bleeding    Will discuss with attending.

## 2018-04-27 NOTE — CHART NOTE - NSCHARTNOTEFT_GEN_A_CORE
Pt was initially planned for EGD, however, EGD was canceled d/t scheduling and anesthetic concerns. Will plan for EGD and colonoscopy on Monday to reduce frequent anesthetic exposure. This was discussed with the patient and the patient is in agreement with the above plan. Recommend monitoring H/H Q6-8H , transfuse Hgb <8. Clear liquid diet Sunday, NPO p MN Sunday.

## 2018-04-27 NOTE — PROGRESS NOTE ADULT - PROBLEM SELECTOR PLAN 6
Leukocytoslis  -WBC: 14.8 on admission shift. Current WBC 11.6K  -Remains afebrile.   -CXR and CT Chest with no infiltrate.   -F/U UA/UCX Leukocytosis  -WBC: 14.8 on admission shift. Current WBC 11.6K  -Remains afebrile.   -CXR and CT Chest with no infiltrate.   -F/U UA/UCX

## 2018-04-27 NOTE — PROGRESS NOTE ADULT - PROBLEM SELECTOR PLAN 2
F/UP echo here  s/p recent 3vCABG 3/27/18 , CP free  continue BB and statin but hold asa given severe anemia  pre-CABG and post CABG EF 60-65%, most recent echo 4/11/18 as outpt 43%  lasix 40mg IV daily  and in between of RBC tx units, reassess dose lasix in am -BNP 2,579. Daily weights, strict I/Os. On exam: 3+ leg edema, extremities cool to touch, + JVD.   -Frontal CXR (4/26/2018): No consolidation. No pleural effusion. No significant change.  -CT chest (4/27/2018): No effusion.   -Echo (4/27/2018): mild AV thickening, mild LV thickening, mild MAC, trace MR, trace TR, LVEF 55-60%. No pericardial effusion.   -Continue Lasix 40 mg IV BID.  (if patient needs further transfusions may need additional Lasix)  -Leg Edema (3+ pitting and cool to touch): Lower extremity venous Duplex ordered and in addition Lactate obtained today and is in WNL.

## 2018-04-27 NOTE — PROGRESS NOTE ADULT - ASSESSMENT
76 y/o man with hx lung Ca s/p RML resection (several years ago, followed at [facility], in remission as per patient),  s/p recent CABG at [facility] 3/27/18 (LIMA-LAD, ALY-RI, SVG-PDA) with uneventful course, since surgery has been with progressive dyspnea, generalized weakness and LE edema - had  recent admission to [facility] for above with CTA chest negative for PE, echo with EF 60-65%,  treated with lasix, discharged on 4/9/18 with H/H at that time  9.2/28.6.  On 4/10/18 noted to be A fib and was started on xarelto and amiodarone.  Today  sent from cardiologist office to [facility] ED for A flutter and worsened LE edema and dyspnea, found to be severely anemic with Hgb  6.2/22.7, VSS with /59,. HR 60-70's, RR 18, O2 sat 98% RA,  EKG with A flutter VR 64, troponin 0.02, BNP  2,579  and fluid overloaded on exam with mostly LE edema.  In ED treated with Lasix 40mg IVP x 1 with good UO and started RBC blood transfusion ( ordered for two units  with IV lasix in between).  Patient is now admitted to 5U telemetry for further w/up and management including blood transfusion, ECHO/BRIAN, GI eval and EP eval with possible CTS eval (chest CT ordered to R/O bleed).  At present remains HD stable. 78 y/o man with hx lung Ca s/p RML resection (several years ago, followed at [facility], in remission as per patient),  s/p recent CABG at [facility] 3/27/18 (LIMA-LAD, ALY-RI, SVG-PDA), diagnosed with Atrial Flutter (4/10/2018) for which Amiodarone and Xarelto were initiated was referred to [facility] ED (4/27/2018) and was found to be in acute diastolic CHF exacerbation with rate controlled Atrial Flutter in the setting of acute anemia. Patient is admitted to [facility] for IV diuresis, GI and EP consultation.

## 2018-04-27 NOTE — PROGRESS NOTE ADULT - SUBJECTIVE AND OBJECTIVE BOX
Interventional Cardiology PA Adult Progress Note    Subjective Assessment:  	  MEDICATIONS:  amiodarone    Tablet 200 milliGRAM(s) Oral daily  furosemide   Injectable 40 milliGRAM(s) IV Push daily  metoprolol succinate ER 50 milliGRAM(s) Oral daily          pantoprazole  Injectable 40 milliGRAM(s) IV Push two times a day    atorvastatin 80 milliGRAM(s) Oral at bedtime    cholecalciferol 1000 Unit(s) Oral daily  cyanocobalamin 1000 MICROGram(s) Oral daily  latanoprost 0.005% Ophthalmic Solution 1 Drop(s) Both EYES at bedtime  timolol 0.5% Solution 1 Drop(s) Both EYES two times a day      	    [PHYSICAL EXAM:  TELEMETRY:  T(C): 36.7 (04-27-18 @ 05:30), Max: 36.7 (04-27-18 @ 05:30)  HR: 54 (04-27-18 @ 10:17) (54 - 74)  BP: 130/60 (04-27-18 @ 10:17) (120/56 - 146/72)  RR: 20 (04-27-18 @ 10:17) (18 - 22)  SpO2: 100% (04-27-18 @ 10:17) (97% - 100%)  Wt(kg): --  I&O's Summary    26 Apr 2018 07:01  -  27 Apr 2018 07:00  --------------------------------------------------------  IN: 250 mL / OUT: 1865 mL / NET: -1615 mL    27 Apr 2018 07:01  -  27 Apr 2018 13:20  --------------------------------------------------------  IN: 0 mL / OUT: 800 mL / NET: -800 mL      Height (cm): 182.88 (04-26 @ 19:39)  Weight (kg): 71.4 (04-26 @ 19:39)  BMI (kg/m2): 21.3 (04-26 @ 19:39)  BSA (m2): 1.92 (04-26 @ 19:39)  Jordan:  Central/PICC/Mid Line:                                         Appearance: Normal	  HEENT:   Normal oral mucosa, PERRL, EOMI	  Neck: Supple, + JVD/ - JVD; Carotid Bruit   Cardiovascular: Normal S1 S2, No JVD, No murmurs,   Respiratory: Lungs clear to auscultation/Decreased Breath Sounds/No Rales, Rhonchi, Wheezing	  Gastrointestinal:  Soft, Non-tender, + BS	  Skin: No rashes, No ecchymoses, No cyanosis  Extremities: Normal range of motion, No clubbing, cyanosis or edema  Vascular: Peripheral pulses palpable 2+ bilaterally  Neurologic: Non-focal  Psychiatry: A & O x 3, Mood & affect appropriate      	    ECG:  	  RADIOLOGY:   DIAGNOSTIC TESTING:  [ ] Echocardiogram:  [ ]  Catheterization:  [ ] Stress Test:    [ ] BRIAN  OTHER: 	    LABS:	 	  CARDIAC MARKERS:                                  8.5    11.6  )-----------( 288      ( 27 Apr 2018 11:28 )             26.6     04-27    139  |  99  |  28<H>  ----------------------------<  109<H>  3.6   |  29  |  1.05    Ca    8.8      27 Apr 2018 06:41  Mg     2.2     04-27    TPro  6.0  /  Alb  3.6  /  TBili  0.7  /  DBili  x   /  AST  49<H>  /  ALT  77<H>  /  AlkPhos  171<H>  04-26    proBNP: Serum Pro-Brain Natriuretic Peptide: 2579 pg/mL (04-26 @ 15:34)    Lipid Profile:   HgA1c:   TSH:   PT/INR - ( 27 Apr 2018 11:28 )   PT: 18.5 sec;   INR: 1.65          PTT - ( 27 Apr 2018 11:28 )  PTT:32.2 sec    ASSESSMENT/PLAN: 	        DVT ppx:  Dispo: Interventional Cardiology PA Adult Progress Note    CC:  Subjective Assessment:  	  MEDICATIONS:  amiodarone    Tablet 200 milliGRAM(s) Oral daily  furosemide   Injectable 40 milliGRAM(s) IV Push daily  metoprolol succinate ER 50 milliGRAM(s) Oral daily          pantoprazole  Injectable 40 milliGRAM(s) IV Push two times a day    atorvastatin 80 milliGRAM(s) Oral at bedtime    cholecalciferol 1000 Unit(s) Oral daily  cyanocobalamin 1000 MICROGram(s) Oral daily  latanoprost 0.005% Ophthalmic Solution 1 Drop(s) Both EYES at bedtime  timolol 0.5% Solution 1 Drop(s) Both EYES two times a day      	    [PHYSICAL EXAM:  TELEMETRY:  T(C): 36.7 (04-27-18 @ 05:30), Max: 36.7 (04-27-18 @ 05:30)  HR: 54 (04-27-18 @ 10:17) (54 - 74)  BP: 130/60 (04-27-18 @ 10:17) (120/56 - 146/72)  RR: 20 (04-27-18 @ 10:17) (18 - 22)  SpO2: 100% (04-27-18 @ 10:17) (97% - 100%)  Wt(kg): --  I&O's Summary    26 Apr 2018 07:01  -  27 Apr 2018 07:00  --------------------------------------------------------  IN: 250 mL / OUT: 1865 mL / NET: -1615 mL    27 Apr 2018 07:01  -  27 Apr 2018 13:20  --------------------------------------------------------  IN: 0 mL / OUT: 800 mL / NET: -800 mL      Height (cm): 182.88 (04-26 @ 19:39)  Weight (kg): 71.4 (04-26 @ 19:39)  BMI (kg/m2): 21.3 (04-26 @ 19:39)  BSA (m2): 1.92 (04-26 @ 19:39)  Jordan:  Central/PICC/Mid Line:                                         Appearance: Normal	  HEENT:   Normal oral mucosa, PERRL, EOMI	  Neck: Supple, + JVD/ - JVD; Carotid Bruit   Cardiovascular: Normal S1 S2, No JVD, No murmurs,   Respiratory: Lungs clear to auscultation/Decreased Breath Sounds/No Rales, Rhonchi, Wheezing	  Gastrointestinal:  Soft, Non-tender, + BS	  Skin: No rashes, No ecchymoses, No cyanosis  Extremities: Normal range of motion, No clubbing, cyanosis or edema  Vascular: Peripheral pulses palpable 2+ bilaterally  Neurologic: Non-focal  Psychiatry: A & O x 3, Mood & affect appropriate      	    ECG:  	  RADIOLOGY:   DIAGNOSTIC TESTING:  [ ] Echocardiogram:  [ ]  Catheterization:  [ ] Stress Test:    [ ] BRIAN  OTHER: 	    LABS:	 	  CARDIAC MARKERS:                                  8.5    11.6  )-----------( 288      ( 27 Apr 2018 11:28 )             26.6     04-27    139  |  99  |  28<H>  ----------------------------<  109<H>  3.6   |  29  |  1.05    Ca    8.8      27 Apr 2018 06:41  Mg     2.2     04-27    TPro  6.0  /  Alb  3.6  /  TBili  0.7  /  DBili  x   /  AST  49<H>  /  ALT  77<H>  /  AlkPhos  171<H>  04-26    proBNP: Serum Pro-Brain Natriuretic Peptide: 2579 pg/mL (04-26 @ 15:34)    Lipid Profile:   HgA1c:   TSH:   PT/INR - ( 27 Apr 2018 11:28 )   PT: 18.5 sec;   INR: 1.65          PTT - ( 27 Apr 2018 11:28 )  PTT:32.2 sec    ASSESSMENT/PLAN: 	        DVT ppx:  Dispo: Interventional Cardiology PA Adult Progress Note    CC: Aflutter, anemia, CHF  Subjective Assessment: Pt seen and examined at bedside this morning. Pt reports leg edema is slightly improved to him. He denies any chest pain, palpitations, dizziness, fever, chills, abdominal pain, melena, N/V, SOB.     12 point review of systems otherwise negative except per subjective    	  MEDICATIONS:  amiodarone    Tablet 200 milliGRAM(s) Oral daily  furosemide   Injectable 40 milliGRAM(s) IV Push daily  metoprolol succinate ER 50 milliGRAM(s) Oral daily  pantoprazole  Injectable 40 milliGRAM(s) IV Push two times a day  atorvastatin 80 milliGRAM(s) Oral at bedtime  cholecalciferol 1000 Unit(s) Oral daily  cyanocobalamin 1000 MICROGram(s) Oral daily  latanoprost 0.005% Ophthalmic Solution 1 Drop(s) Both EYES at bedtime  timolol 0.5% Solution 1 Drop(s) Both EYES two times a day    [PHYSICAL EXAM:  TELEMETRY: Atrial Flutter 50-80 BPM  T(C): 36.7 (04-27-18 @ 05:30), Max: 36.7 (04-27-18 @ 05:30)  HR: 54 (04-27-18 @ 10:17) (54 - 74)  BP: 130/60 (04-27-18 @ 10:17) (120/56 - 146/72)  RR: 20 (04-27-18 @ 10:17) (18 - 22)  SpO2: 100% (04-27-18 @ 10:17) (97% - 100%)  Wt(kg): --  I&O's Summary    26 Apr 2018 07:01  -  27 Apr 2018 07:00  --------------------------------------------------------  IN: 250 mL / OUT: 1865 mL / NET: -1615 mL    27 Apr 2018 07:01  -  27 Apr 2018 13:20  --------------------------------------------------------  IN: 0 mL / OUT: 800 mL / NET: -800 mL      Height (cm): 182.88 (04-26 @ 19:39)  Weight (kg): 71.4 (04-26 @ 19:39)  BMI (kg/m2): 21.3 (04-26 @ 19:39)  BSA (m2): 1.92 (04-26 @ 19:39)    Gen: NAD, resting comfortable sitting upright in bed  Neck: +JVD b/l  Cardiac: +S1, S2, RRR, +sternotomy scar healing   Pulm: Decreased breathsounds @ bases, otherwise CTA   Abdomen: BS present, soft, NT, ND  Extremities: + Cool to touch from calf to toes. 3+ pitting edema b/l  Neuro: A+Ox3, no focal deficits. Pleasant and cooperative.      LABS:	 	                                   8.5    11.6  )-----------( 288      ( 27 Apr 2018 11:28 )             26.6     04-27    139  |  99  |  28<H>  ----------------------------<  109<H>  3.6   |  29  |  1.05    Ca    8.8      27 Apr 2018 06:41  Mg     2.2     04-27    TPro  6.0  /  Alb  3.6  /  TBili  0.7  /  DBili  x   /  AST  49<H>  /  ALT  77<H>  /  AlkPhos  171<H>  04-26    proBNP: Serum Pro-Brain Natriuretic Peptide: 2579 pg/mL (04-26 @ 15:34)       PT/INR - ( 27 Apr 2018 11:28 )   PT: 18.5 sec;   INR: 1.65          PTT - ( 27 Apr 2018 11:28 )  PTT:32.2 sec

## 2018-04-27 NOTE — CONSULT NOTE ADULT - SUBJECTIVE AND OBJECTIVE BOX
Patient is a 78 yo M PMH of Lung CA s/p resection years ago, recent CABG 3/37, DCed on 4/9 outside hospital with hb 9.2, recently started on Xarelto for afib on 4/10, who was sent in by outpatient cardiologist for anemia hbg of 6.2. Per patient, he has been feeling more fatigue for the past few weeks shortly hospital discharge, worsening exertional SOB and LE edema. Never experience anything like this before. Reported for the past week as been having loose stool, getting darker each time. Never had history of GI bleed. No NSAID use, stated he drinks 1 glass of whine daily, on baby aspirin daily. Denied echymosis, no epitaxis, no other abnormal bleeding. He denied fever/chill, no n/v, no dysphagia, no abdominal pain, no epigastric pain. No history of anemia, no history of colon cancer in family. C scop 5 months ago failed due to poor prep, had C-scope 5 years prior to that stated was normal. Never had EGD in the past.  In the ED, his VSS stable, he was found to have hb 6.8, was transfused 2 units pRBC overnight.  Pt examined at bedside.   Denies pain, no n/v, no chest pain.    V/S    T(F): 98.1 (04-27-18 @ 05:30), Max: 98.1 (04-27-18 @ 05:30)  HR: 54 (04-27-18 @ 10:17)  BP: 130/60 (04-27-18 @ 10:17)  RR: 20 (04-27-18 @ 10:17)  SpO2: 100% (04-27-18 @ 10:17)  Wt(kg): --  PE     GEN - Appears stated age. No distress.           HEENT - NCAT, EOMI, PERRLA, MMM           CVS - irregular rythm, no M/R/G, no JVD           PULM - CTA B/L, equal air entry, no increased work of breathing           ABD - Soft, nontender, nondistended, normal BS           Rectal - erythemous and excoriation around anal area, no gross blood, black stool on rectal exam, no masses            EXT - Distal extremities warm, no cyanosis, no edema.           NEURO - AOx3, Moves all extremities.     LAB                        8.5    11.6  )-----------( 288      ( 27 Apr 2018 11:28 )             26.6               04-27    139  |  99  |  28<H>  ----------------------------<  109<H>  3.6   |  29  |  1.05    Ca    8.8      27 Apr 2018 06:41  Mg     2.2     04-27    TPro  6.0  /  Alb  3.6  /  TBili  0.7  /  DBili  x   /  AST  49<H>  /  ALT  77<H>  /  AlkPhos  171<H>  04-26              PT/INR - ( 27 Apr 2018 11:28 )   PT: 18.5 sec;   INR: 1.65          PTT - ( 27 Apr 2018 11:28 )  PTT:32.2 sec            LIVER FUNCTIONS - ( 26 Apr 2018 15:34 )  Alb: 3.6 g/dL / Pro: 6.0 g/dL / ALK PHOS: 171 U/L / ALT: 77 U/L / AST: 49 U/L / GGT: x             Additional tests & radiology reviewed. Patient is a 78 yo M PMH of Lung CA s/p resection years ago, recent CABG 3/37, DCed on 4/9 outside hospital with hb 9.2, recently started on Xarelto for afib on 4/10, who was sent in by outpatient cardiologist for anemia hbg of 6.2. Per patient, he has been feeling more fatigue for the past few weeks shortly hospital discharge, worsening exertional SOB and LE edema. Never experience anything like this before. Reported for the past week as been having loose stool, getting darker each time. Never had history of GI bleed. No NSAID use, stated he drinks 1 glass of whine daily, on baby aspirin daily. Denied echymosis, no epitaxis, no other abnormal bleeding. He denied fever/chill, no n/v, no dysphagia, no abdominal pain, no epigastric pain. No history of anemia, no history of colon cancer in family. C scop 5 months ago failed due to poor prep, had C-scope 5 years prior to that stated was normal. Never had EGD in the past.  In the ED, his VSS stable, he was found to have hb 6.8, was transfused 2 units pRBC overnight.  Pt examined at bedside.   Denies pain, no n/v, no chest pain.    V/S    T(F): 98.1 (04-27-18 @ 05:30), Max: 98.1 (04-27-18 @ 05:30)  HR: 54 (04-27-18 @ 10:17)  BP: 130/60 (04-27-18 @ 10:17)  RR: 20 (04-27-18 @ 10:17)  SpO2: 100% (04-27-18 @ 10:17)  Wt(kg): --  PE     GEN - Appears stated age. No distress.           HEENT - NCAT, EOMI, PERRLA, MMM           CVS - irregular rythm, no M/R/G, no JVD           PULM - CTA B/L, equal air entry, no increased work of breathing           ABD - Soft, nontender, nondistended, normal BS           Rectal - erythemous and excoriation around anal area, no gross blood, black stool on rectal exam, no masses            EXT - Distal extremities warm, no cyanosis, 2+ pitting pedal and calf edema b/l           NEURO - AOx3, Moves all extremities.     LAB                        8.5    11.6  )-----------( 288      ( 27 Apr 2018 11:28 )             26.6               04-27    139  |  99  |  28<H>  ----------------------------<  109<H>  3.6   |  29  |  1.05    Ca    8.8      27 Apr 2018 06:41  Mg     2.2     04-27    TPro  6.0  /  Alb  3.6  /  TBili  0.7  /  DBili  x   /  AST  49<H>  /  ALT  77<H>  /  AlkPhos  171<H>  04-26              PT/INR - ( 27 Apr 2018 11:28 )   PT: 18.5 sec;   INR: 1.65          PTT - ( 27 Apr 2018 11:28 )  PTT:32.2 sec            LIVER FUNCTIONS - ( 26 Apr 2018 15:34 )  Alb: 3.6 g/dL / Pro: 6.0 g/dL / ALK PHOS: 171 U/L / ALT: 77 U/L / AST: 49 U/L / GGT: x             Additional tests & radiology reviewed. Patient is a 78 yo M PMH of myasthenia gravis, Lung CA s/p RML resection years ago, CAD s/p recent CABG 3/37, DCed on 4/9 outside hospital with hb 9.2, recently started on Xarelto for afib on 4/10, who was sent in by outpatient cardiologist for anemia hbg of 6.2. Per patient, he has been feeling more fatigue for the past few weeks shortly hospital discharge, worsening exertional SOB and LE edema. Never experience anything like this before. Reported for the past week as been having loose stool, getting darker each time. Never had history of GI bleed. No NSAID use, stated he drinks 1 glass of whine daily, on baby aspirin daily. Denied echymosis, no epitaxis, no other abnormal bleeding. He denied fever/chill, no n/v, no dysphagia, no abdominal pain, no epigastric pain. No history of anemia, no history of colon cancer in family. C scop 5 months ago failed due to poor prep, had C-scope 5 years prior to that stated was normal. Never had EGD in the past.  In the ED, his VSS stable, he was found to have hb 6.8, was transfused 2 units pRBC overnight.  Pt examined at bedside.   Denies pain, no n/v, no chest pain.    V/S    T(F): 98.1 (04-27-18 @ 05:30), Max: 98.1 (04-27-18 @ 05:30)  HR: 54 (04-27-18 @ 10:17)  BP: 130/60 (04-27-18 @ 10:17)  RR: 20 (04-27-18 @ 10:17)  SpO2: 100% (04-27-18 @ 10:17)  Wt(kg): --  PE     GEN - Appears stated age. No distress.           HEENT - NCAT, EOMI, PERRLA, MMM           CVS - irregular rythm, no M/R/G, no JVD           PULM - CTA B/L, equal air entry, no increased work of breathing           ABD - Soft, nontender, nondistended, normal BS           Rectal - erythemous and excoriation around anal area, no gross blood, black stool on rectal exam, no masses            EXT - Distal extremities warm, no cyanosis, 2+ pitting pedal and calf edema b/l           NEURO - AOx3, Moves all extremities.     LAB                        8.5    11.6  )-----------( 288      ( 27 Apr 2018 11:28 )             26.6               04-27    139  |  99  |  28<H>  ----------------------------<  109<H>  3.6   |  29  |  1.05    Ca    8.8      27 Apr 2018 06:41  Mg     2.2     04-27    TPro  6.0  /  Alb  3.6  /  TBili  0.7  /  DBili  x   /  AST  49<H>  /  ALT  77<H>  /  AlkPhos  171<H>  04-26              PT/INR - ( 27 Apr 2018 11:28 )   PT: 18.5 sec;   INR: 1.65          PTT - ( 27 Apr 2018 11:28 )  PTT:32.2 sec            LIVER FUNCTIONS - ( 26 Apr 2018 15:34 )  Alb: 3.6 g/dL / Pro: 6.0 g/dL / ALK PHOS: 171 U/L / ALT: 77 U/L / AST: 49 U/L / GGT: x             Additional tests & radiology reviewed.

## 2018-04-27 NOTE — PROGRESS NOTE ADULT - PROBLEM SELECTOR PLAN 4
-s/p 3VCABG: 3/27/18 (LIMA-LAD, ALY-RI, SVG-PDA) at [place]  -Remains chest pain free. Troponin 0.02  -Holding ASA in the setting of acute anemia. Continue Lipitor 80 mg daily, Toprol XL 50 mg daily  -F/U Lipid panel and Hemoglobin A1c. -s/p 3VCABG: 3/27/18 (LIMA-LAD, ALY-RI, SVG-PDA) at [place]  -Remains chest pain free. Troponin 0.02--0.03. No further troponin trend per Dr. Bonilla  -Holding ASA in the setting of acute anemia. Continue Lipitor 80 mg daily, Toprol XL 50 mg daily  -F/U Lipid panel and Hemoglobin A1c. -s/p 3VCABG: 3/27/18 (LIMA-LAD, ALY-RI, SVG-PDA) at [place]  -Remains chest pain free. Troponin 0.02--0.03. F/u AM troponin as Anesthesiologist would like trended till it peaks.   -Holding ASA in the setting of acute anemia. Continue Lipitor 80 mg daily, Toprol XL 50 mg daily  -F/U Lipid panel and Hemoglobin A1c. -s/p 3VCABG: 3/27/18 (LIMA-LAD, ALY-RI, SVG-PDA) at [place]  -Remains chest pain free. Troponin 0.02--0.03. No further troponin trend per Dr. Bonilla.   -Holding ASA in the setting of acute anemia. Continue Lipitor 80 mg daily, Toprol XL 50 mg daily  -F/U Lipid panel and Hemoglobin A1c.

## 2018-04-27 NOTE — CONSULT NOTE ADULT - ASSESSMENT
76 yo M PMH of Lung CA s/p resection years ago, recent CABG 3/37, DCed on 4/9 outside hospital with hb 9.2, recently started on Xarelto for afib on 4/10, who was sent in by outpatient cardiologist for anemia hbg of 6.2, found to have black stool on rectal exam.

## 2018-04-27 NOTE — PROGRESS NOTE ADULT - PROBLEM SELECTOR PLAN 1
HOLD ASA and xarelto  unclear source at present, stool guaic in ED weakly +  F/UP serial occult stools on floor  GI eval in am   started on empiric PPI BID   F/UP chest CT to r/o internal post op bleed on xarelto -GI consulted.   -H/H 6.8/22.7 on admission. Macro/normocytic anemia consistent with acute bleed (Iron panel normal but was drawn post transfusion). F/U repeat AM Iron panel.   -Guaiac positive in ED  -s/p 2 Units of PRBC 4/26/2018. H/H this AM: 9.0/28.4 followed by repeat H/H of 8.5/26.6 this afternoon. Will maintain active Type and screen and continue to trend CBC q8hrs. F/U Repeat CBC and Coags at 6 PM.   -Will continue to hold ASA and Xarelto.   -F/U Stool occult  -Plan for EGD today (add on zone). Patient will also need C-Scope this admission.   -Continue Protonix 40 mg IV BID.   -CT Chest w/o to r/o post op bleed (CABG 3/2018): No acute thoracic pathology. Small volume of complex/hemorrhagic pericardial fluid. Negative for pleural effusion. There is significant hypodensity of intracardiac blood, consistent with anemia. Mild edematous infiltration of the anterior mediastinum, likely   postprocedural in nature with a very small fluid collection measuring 2.4  cm within the anterior mediastinum without air component. -GI consulted.   -H/H 6.8/22.7 on admission. Macro/normocytic anemia consistent with acute bleed (Iron panel normal but was drawn post transfusion). F/U repeat AM Iron panel.   -Guaiac positive in ED  -s/p 2 Units of PRBC 4/26/2018. H/H this AM: 9.0/28.4 followed by repeat H/H of 8.5/26.6 this afternoon. Will maintain active Type and screen and continue to trend CBC q8hrs. F/U Repeat CBC and Coags at 6 PM.   -Will continue to hold ASA and Xarelto.   -F/U Stool occult  -Initial plan was for EGD today but patient was not clinically optimized. Patient will need EGD/C-Scope this admission. GI did not call to notify PA staff case was canceled, found out through RN. GI team not answering pager; will need to clarify when GI work up will be done.   -Continue Protonix 40 mg IV BID.   -CT Chest w/o to r/o post op bleed (CABG 3/2018): No acute thoracic pathology. Small volume of complex/hemorrhagic pericardial fluid. Negative for pleural effusion. There is significant hypodensity of intracardiac blood, consistent with anemia. Mild edematous infiltration of the anterior mediastinum, likely   postprocedural in nature with a very small fluid collection measuring 2.4  cm within the anterior mediastinum without air component. -H/H 6.8/22.7 on admission. Macro/normocytic anemia consistent with acute bleed (Iron panel normal but was drawn post transfusion). F/U repeat AM Iron panel.   -Guaiac positive in ED  -s/p 2 Units of PRBC 4/26/2018. H/H this AM: 9.0/28.4 followed by repeat H/H of 8.5/26.6 this afternoon. Will maintain active Type and screen and continue to trend CBC q8hrs. F/U Repeat CBC and Coags at 6 PM.   -Will continue to hold ASA and Xarelto.   -F/U Stool occult  -Initial plan was for EGD today but anesthesiologist canceled case as they did not feel patient was optimized and would like troponin trended till it peaks.   - Plan is for EGD/C-Scope Monday. Will need clear liquid diet start Yosef Morning and Golytely @ 6 PM Sunday evening.   -Continue Protonix 40 mg IV BID.   -CT Chest w/o to r/o post op bleed (CABG 3/2018): No acute thoracic pathology. Small volume of complex/hemorrhagic pericardial fluid. Negative for pleural effusion. There is significant hypodensity of intracardiac blood, consistent with anemia. Mild edematous infiltration of the anterior mediastinum, likely   postprocedural in nature with a very small fluid collection measuring 2.4  cm within the anterior mediastinum without air component. -H/H 6.8/22.7 on admission. Macro/normocytic anemia consistent with acute bleed (Iron panel normal but was drawn post transfusion). F/U repeat AM Iron panel.   -Guaiac positive in ED  -s/p 2 Units of PRBC 4/26/2018. H/H this AM: 9.0/28.4 followed by repeat H/H of 8.5/26.6 this afternoon. Will maintain active Type and screen and continue to trend CBC q8hrs. F/U Repeat CBC and Coags at 6 PM.   -Will continue to hold ASA and Xarelto.   -F/U Stool occult  -Initial plan was for EGD today but anesthesiologist canceled case as they did not feel patient was optimized and would like troponin trended till it peaks. Per Dr. Bonilla patient remains cardiac cleared per Dr. Bonilla. In addition, Dr. Bonilla attempted to get in touch with anesthesiologist team multiple times this evening to discuss case with no call back. No further troponin trend per Dr. Bonilla  - Plan is for EGD/C-Scope Monday. Will need clear liquid diet start Yosef Morning and Golytely @ 6 PM Sunday evening.   -Continue Protonix 40 mg IV BID.   -CT Chest w/o to r/o post op bleed (CABG 3/2018): No acute thoracic pathology. Small volume of complex/hemorrhagic pericardial fluid. Negative for pleural effusion. There is significant hypodensity of intracardiac blood, consistent with anemia. Mild edematous infiltration of the anterior mediastinum, likely postprocedural in nature with a very small fluid collection measuring 2.4  cm within the anterior mediastinum without air component.

## 2018-04-28 ENCOUNTER — TRANSCRIPTION ENCOUNTER (OUTPATIENT)
Age: 78
End: 2018-04-28

## 2018-04-28 LAB
ANION GAP SERPL CALC-SCNC: 10 MMOL/L — SIGNIFICANT CHANGE UP (ref 5–17)
APTT BLD: 27.1 SEC — LOW (ref 27.5–37.4)
BASOPHILS NFR BLD AUTO: 0 % — SIGNIFICANT CHANGE UP (ref 0–2)
BLD GP AB SCN SERPL QL: NEGATIVE — SIGNIFICANT CHANGE UP
BUN SERPL-MCNC: 28 MG/DL — HIGH (ref 7–23)
CALCIUM SERPL-MCNC: 8.9 MG/DL — SIGNIFICANT CHANGE UP (ref 8.4–10.5)
CHLORIDE SERPL-SCNC: 98 MMOL/L — SIGNIFICANT CHANGE UP (ref 96–108)
CHOLEST SERPL-MCNC: 98 MG/DL — SIGNIFICANT CHANGE UP (ref 10–199)
CO2 SERPL-SCNC: 31 MMOL/L — SIGNIFICANT CHANGE UP (ref 22–31)
CREAT SERPL-MCNC: 1.01 MG/DL — SIGNIFICANT CHANGE UP (ref 0.5–1.3)
CULTURE RESULTS: NO GROWTH — SIGNIFICANT CHANGE UP
EOSINOPHIL NFR BLD AUTO: 0.3 % — SIGNIFICANT CHANGE UP (ref 0–6)
GLUCOSE SERPL-MCNC: 108 MG/DL — HIGH (ref 70–99)
HBA1C BLD-MCNC: 4.7 % — SIGNIFICANT CHANGE UP (ref 4–5.6)
HCT VFR BLD CALC: 26.8 % — LOW (ref 39–50)
HCT VFR BLD CALC: 29.8 % — LOW (ref 39–50)
HDLC SERPL-MCNC: 56 MG/DL — SIGNIFICANT CHANGE UP (ref 40–125)
HGB BLD-MCNC: 8.4 G/DL — LOW (ref 13–17)
HGB BLD-MCNC: 9.3 G/DL — LOW (ref 13–17)
INR BLD: 1.37 — HIGH (ref 0.88–1.16)
LIPID PNL WITH DIRECT LDL SERPL: 33 MG/DL — SIGNIFICANT CHANGE UP
LYMPHOCYTES # BLD AUTO: 10 % — LOW (ref 13–44)
MCHC RBC-ENTMCNC: 30.6 PG — SIGNIFICANT CHANGE UP (ref 27–34)
MCHC RBC-ENTMCNC: 30.8 PG — SIGNIFICANT CHANGE UP (ref 27–34)
MCHC RBC-ENTMCNC: 31.2 G/DL — LOW (ref 32–36)
MCHC RBC-ENTMCNC: 31.3 G/DL — LOW (ref 32–36)
MCV RBC AUTO: 98 FL — SIGNIFICANT CHANGE UP (ref 80–100)
MCV RBC AUTO: 98.2 FL — SIGNIFICANT CHANGE UP (ref 80–100)
MONOCYTES NFR BLD AUTO: 9.5 % — SIGNIFICANT CHANGE UP (ref 2–14)
NEUTROPHILS NFR BLD AUTO: 80.2 % — HIGH (ref 43–77)
PLATELET # BLD AUTO: 309 K/UL — SIGNIFICANT CHANGE UP (ref 150–400)
PLATELET # BLD AUTO: 323 K/UL — SIGNIFICANT CHANGE UP (ref 150–400)
POTASSIUM SERPL-MCNC: 3.3 MMOL/L — LOW (ref 3.5–5.3)
POTASSIUM SERPL-SCNC: 3.3 MMOL/L — LOW (ref 3.5–5.3)
PROTHROM AB SERPL-ACNC: 15.3 SEC — HIGH (ref 9.8–12.7)
RBC # BLD: 2.73 M/UL — LOW (ref 4.2–5.8)
RBC # BLD: 3.04 M/UL — LOW (ref 4.2–5.8)
RBC # FLD: 22.8 % — HIGH (ref 10.3–16.9)
RBC # FLD: 23.7 % — HIGH (ref 10.3–16.9)
RH IG SCN BLD-IMP: POSITIVE — SIGNIFICANT CHANGE UP
SODIUM SERPL-SCNC: 139 MMOL/L — SIGNIFICANT CHANGE UP (ref 135–145)
SPECIMEN SOURCE: SIGNIFICANT CHANGE UP
TOTAL CHOLESTEROL/HDL RATIO MEASUREMENT: 1.8 RATIO — LOW (ref 3.4–9.6)
TRIGL SERPL-MCNC: 45 MG/DL — SIGNIFICANT CHANGE UP (ref 10–149)
WBC # BLD: 10.9 K/UL — HIGH (ref 3.8–10.5)
WBC # BLD: 12.2 K/UL — HIGH (ref 3.8–10.5)
WBC # FLD AUTO: 10.9 K/UL — HIGH (ref 3.8–10.5)
WBC # FLD AUTO: 12.2 K/UL — HIGH (ref 3.8–10.5)

## 2018-04-28 PROCEDURE — 99232 SBSQ HOSP IP/OBS MODERATE 35: CPT

## 2018-04-28 RX ORDER — FUROSEMIDE 40 MG
40 TABLET ORAL
Qty: 0 | Refills: 0 | Status: DISCONTINUED | OUTPATIENT
Start: 2018-04-28 | End: 2018-05-04

## 2018-04-28 RX ORDER — ZALEPLON 10 MG
5 CAPSULE ORAL ONCE
Qty: 0 | Refills: 0 | Status: DISCONTINUED | OUTPATIENT
Start: 2018-04-28 | End: 2018-04-28

## 2018-04-28 RX ORDER — POTASSIUM CHLORIDE 20 MEQ
60 PACKET (EA) ORAL ONCE
Qty: 0 | Refills: 0 | Status: COMPLETED | OUTPATIENT
Start: 2018-04-28 | End: 2018-04-28

## 2018-04-28 RX ADMIN — Medication 1000 UNIT(S): at 11:26

## 2018-04-28 RX ADMIN — Medication 1 DROP(S): at 18:53

## 2018-04-28 RX ADMIN — Medication 40 MILLIGRAM(S): at 18:44

## 2018-04-28 RX ADMIN — Medication 60 MILLIEQUIVALENT(S): at 11:25

## 2018-04-28 RX ADMIN — AMIODARONE HYDROCHLORIDE 200 MILLIGRAM(S): 400 TABLET ORAL at 06:16

## 2018-04-28 RX ADMIN — Medication 5 MILLIGRAM(S): at 01:18

## 2018-04-28 RX ADMIN — ATORVASTATIN CALCIUM 80 MILLIGRAM(S): 80 TABLET, FILM COATED ORAL at 21:15

## 2018-04-28 RX ADMIN — PANTOPRAZOLE SODIUM 40 MILLIGRAM(S): 20 TABLET, DELAYED RELEASE ORAL at 06:16

## 2018-04-28 RX ADMIN — Medication 50 MILLIGRAM(S): at 07:40

## 2018-04-28 RX ADMIN — Medication 40 MILLIGRAM(S): at 06:16

## 2018-04-28 RX ADMIN — PANTOPRAZOLE SODIUM 40 MILLIGRAM(S): 20 TABLET, DELAYED RELEASE ORAL at 18:45

## 2018-04-28 RX ADMIN — PREGABALIN 1000 MICROGRAM(S): 225 CAPSULE ORAL at 11:26

## 2018-04-28 NOTE — DISCHARGE NOTE ADULT - MEDICATION SUMMARY - MEDICATIONS TO TAKE
I will START or STAY ON the medications listed below when I get home from the hospital:    amiodarone 200 mg oral tablet  -- 1 tab(s) by mouth once a day  -- Indication: For Atrial fibrillation and flutter/ keeping you in a normal rhythm    Xarelto 20 mg oral tablet  -- 1 tab(s) by mouth once a day (in the evening)  -- Indication: For Atrial fibrillation and flutter/ blood thinner to avoid stroke    Lipitor 80 mg oral tablet  -- 1 tab(s) by mouth once a day  -- Indication: For Cholesterol control    metoprolol succinate 25 mg oral tablet, extended release  -- 1 tab(s) by mouth once a day  -- Indication: For Heart rate control/ blood pressure control    furosemide 40 mg oral tablet  -- 1 tab(s) by mouth 2 times a day   -- Avoid prolonged or excessive exposure to direct and/or artificial sunlight while taking this medication.  It is very important that you take or use this exactly as directed.  Do not skip doses or discontinue unless directed by your doctor.  It may be advisable to drink a full glass orange juice or eat a banana daily while taking this medication.    -- Indication: For Congestive heart failure/ Diuretic / Water pill    Travatan Z 0.004% ophthalmic solution  -- 1 drop(s) to each affected eye once a day (in the evening)  -- Indication: For Eye drop    timolol hemihydrate 0.5% ophthalmic solution  -- 1 drop(s) to each affected eye 2 times a day  -- Indication: For Eye drop    pantoprazole 40 mg oral delayed release tablet  -- 1 tab(s) by mouth once a day   -- It is very important that you take or use this exactly as directed.  Do not skip doses or discontinue unless directed by your doctor.  Obtain medical advice before taking any non-prescription drugs as some may affect the action of this medication.  Swallow whole.  Do not crush.    -- Indication: For GI bleeding/ stomach acid control    cholecalciferol 1000 intl units oral tablet  -- 1 tab(s) by mouth once a day  -- Indication: For Vitamin D supplement    cyanocobalamin 1000 mcg oral tablet  -- 1 tab(s) by mouth once a day  -- Indication: For Vitamin B12 supplement

## 2018-04-28 NOTE — DISCHARGE NOTE ADULT - PATIENT PORTAL LINK FT
You can access the [facility] Patient Portal, offered by [facility], by registering with the following website: [email or web address]

## 2018-04-28 NOTE — PROGRESS NOTE ADULT - PROBLEM SELECTOR PLAN 2
-BNP 2,579. Daily weights, strict I/Os. On exam: 3+ leg edema, Left lower extremity cool to touch, + JVD, decreased BS b/l bases  -Frontal CXR (4/26/2018): No consolidation. No pleural effusion. No significant change.  -CT chest (4/27/2018): No effusion.   -Echo (4/27/2018): mild AV thickening, mild LV thickening, mild MAC, trace MR, trace TR, LVEF 55-60%. No pericardial effusion.   -Continue Lasix 40 mg IV BID.  (if patient needs further transfusions may need additional Lasix)  -Leg Edema (3+ pitting and LLE cool to touch): Lower extremity venous Duplex 04/27 no DVT seen, Lactate obtained and is in WNL.

## 2018-04-28 NOTE — DISCHARGE NOTE ADULT - PROVIDER TOKENS
TOKEN:'[id number]',TOKEN:'[id number]' TOKEN:'[id number]',TOKEN:'[id number]' TOKEN:'[id number]',TOKEN:'[id number]',TOKEN:'[id number]'

## 2018-04-28 NOTE — DISCHARGE NOTE ADULT - CARE PROVIDERS DIRECT ADDRESSES
[email or web address]

## 2018-04-28 NOTE — PROGRESS NOTE ADULT - PROBLEM SELECTOR PLAN 4
-s/p 3VCABG: 3/27/18 (LIMA-LAD, ALY-RI, SVG-PDA) at [place]  -CP free this AM. Troponin 0.02--0.03. No further troponin trend per Dr. Bonilla.   -Holding ASA in the setting of acute anemia.   -Continue Lipitor 80 mg daily, Toprol XL 50 mg daily  - LDL: 33 and Hemoglobin A1c 4.7

## 2018-04-28 NOTE — DISCHARGE NOTE ADULT - PLAN OF CARE
Your heart doesn't relax as well as it should. This is called HEART FAILURE. Your body is sensitive to having  TOO MUCH fluid. Please DO NOT DRINK more than 1 LITER  of fluid a day. Weigh yourself EVERY DAY. If you GAIN MORE THAN 3 pounds IN ONE DAY call Dr. BEAL. If you have SHORTNESS OF BREATH go to the nearest ED IMMEDIATELY. START taking FUROSEMIDE (LASIX) 40mg TWICE a day. This is a diuretic/water pill to help you have a good fluid balance. Sometimes your heart beats irregularly. This is called ATRIAL FIBRILLATION/FLUTTER. You had a CARDIOVERSION to shock you into a normal rhythm. CONTINUE taking XARELTO 20mg daily with dinner Sometimes your heart beats irregularly. This is called ATRIAL FIBRILLATION/FLUTTER. You had a CARDIOVERSION to shock you into a normal rhythm. CONTINUE taking XARELTO 20mg daily with dinner to thin your blood so you do not get blood clots in your heart that could give you a stroke. You had a bleed in your stomach. You had an endoscopy and colonoscopy which showed a clean base ulcer in duodenal bulb. The GI doctor (Dr. Alec Mendoza) stated that your bleed is over and that you can take the blood thinner XARELTO again. START taking PANTOPRAZOLE (PROTONIX) 40mg once daily to help your stomach recover from your bleed. STOP taking METOPROLOL SUCCINATE ER 50mg once daily. START taking METOPROLOL SUCCINATE ER 25mg once daily STOP taking ASPIRIN 81mg once daily.

## 2018-04-28 NOTE — PROGRESS NOTE ADULT - ASSESSMENT
76 yo M PMH of Lung CA s/p RML resection, CAD s/p recent CABG (3/31), DCed on 4/9 outside hospital with hb 9.2, recently started on Xarelto for afib on 4/10, who was sent in by outpatient cardiologist for anemia hbg of 6.2 c/o melena.     # Acute blood loss anemia 2/2 PUD vs AVMs vs gastritis vs malignancy  - s/p 2 u PRBCs  - H/H stable, HD stable  - No further e/o acute/active GI bleeding (i.e. melena, hematochezia, hematemesis)  - C/w Protonix 40 mg IVP BID  - Monitor H/H at least Q12H, transfuse Hgb <8  - Per cardiology, pt is optimized for EGD/colonoscopy  - Will plan for EGD and colonoscopy on Monday  - Clear liquid diet tomorrow, Golytely 4L prep starting at 6 PM Sunday evening, NPO p MN Sunday  - If e/o overt GI bleeding (i.e. melena, hematochezia, hematemesis) please notify GI fellow on call to consider urgent EGD +/- colonoscopy    # Elevated transaminases  - Pt with previously normal LFTs, likely elevated in the setting of statin use  - Please monitor daily LFTs  - Check RUQ U/S  - Will consider further CLD work-up pending above    Case d/w Dr. Evelyn HENDRICKSON will follow

## 2018-04-28 NOTE — DISCHARGE NOTE ADULT - NSFTFADEVICE3FT_GEN_ALL_CORE
Atrial Flutter, Diastolic Congestive Heart Failure, CAD s/p CABG 3/27/18, Weakness and Deconditioning due to Advanced Age

## 2018-04-28 NOTE — DISCHARGE NOTE ADULT - CARE PLAN
Principal Discharge DX:	Acute CHF (congestive heart failure)  Goal:	Your heart doesn't relax as well as it should. This is called HEART FAILURE. Your body is sensitive to having  TOO MUCH fluid. Please DO NOT DRINK more than 1 LITER  of fluid a day. Weigh yourself EVERY DAY. If you GAIN MORE THAN 3 pounds IN ONE DAY call Dr. BEAL. If you have SHORTNESS OF BREATH go to the nearest ED IMMEDIATELY.  Assessment and plan of treatment:	START taking FUROSEMIDE (LASIX) 40mg TWICE a day. This is a diuretic/water pill to help you have a good fluid balance.  Secondary Diagnosis:	Atrial fibrillation and flutter  Goal:	Sometimes your heart beats irregularly. This is called ATRIAL FIBRILLATION/FLUTTER. You had a CARDIOVERSION to shock you into a normal rhythm. CONTINUE taking XARELTO 20mg daily with dinner  Secondary Diagnosis:	GI bleed  Secondary Diagnosis:	Hypertension  Secondary Diagnosis:	CAD (coronary artery disease) Principal Discharge DX:	Acute CHF (congestive heart failure)  Goal:	Your heart doesn't relax as well as it should. This is called HEART FAILURE. Your body is sensitive to having  TOO MUCH fluid. Please DO NOT DRINK more than 1 LITER  of fluid a day. Weigh yourself EVERY DAY. If you GAIN MORE THAN 3 pounds IN ONE DAY call Dr. BEAL. If you have SHORTNESS OF BREATH go to the nearest ED IMMEDIATELY.  Assessment and plan of treatment:	START taking FUROSEMIDE (LASIX) 40mg TWICE a day. This is a diuretic/water pill to help you have a good fluid balance.  Secondary Diagnosis:	Atrial fibrillation and flutter  Goal:	Sometimes your heart beats irregularly. This is called ATRIAL FIBRILLATION/FLUTTER. You had a CARDIOVERSION to shock you into a normal rhythm.  Assessment and plan of treatment:	CONTINUE taking XARELTO 20mg daily with dinner to thin your blood so you do not get blood clots in your heart that could give you a stroke.  Secondary Diagnosis:	GI bleed  Goal:	You had a bleed in your stomach. You had an endoscopy and colonoscopy which showed a clean base ulcer in duodenal bulb. The GI doctor (Dr. Alec Mendoza) stated that your bleed is over and that you can take the blood thinner XARELTO again.  Assessment and plan of treatment:	START taking PANTOPRAZOLE (PROTONIX) 40mg once daily to help your stomach recover from your bleed.  Secondary Diagnosis:	Hypertension  Goal:	STOP taking METOPROLOL SUCCINATE ER 50mg once daily. START taking METOPROLOL SUCCINATE ER 25mg once daily  Secondary Diagnosis:	CAD (coronary artery disease)  Goal:	STOP taking ASPIRIN 81mg once daily.

## 2018-04-28 NOTE — DISCHARGE NOTE ADULT - HOSPITAL COURSE
78 y/o man with hx lung Ca s/p RML resection (several years ago, followed at [facility], in remission as per patient),  s/p recent CABG at [facility] 3/27/18 (LIMA-LAD, ALY-RI, SVG-PDA) with uneventful course, since surgery has been with progressive dyspnea, generalized weakness and LE edema - had  recent admission to [facility] for above with CTA chest negative for PE, echo with EF 60-65%,  treated with lasix, discharged on 4/9/18 with Hgb/HCT at that time  9.2/28.6.  On 4/10/18 noted to be A fib and was started on xarelto and amiodarone.  On 4/26/18  sent from cardiologist office to [facility] for A flutter and worsened LE edema and dyspnea, found to be severely anemic with Hgb/HCT   6.2/22.7, VSS with /59,. HR 60-70's, RR 18, O2 sat 98% RA,  EKG with A flutter VR 64, troponin 0.02, BNP  2,579  and fluid overloaded on exam with mostly LE edema.  In ED treated with Lasix 40mg IVP x 1 with good UO and started RBC blood transfusion ( ordered for two units  with IV lasix in between).  Patient admitted to [facility] for further work-up.  CT Chest w/o to r/o post op bleed (CABG 3/2018): No acute thoracic pathology.   EP consulted and recommended BRIAN/DCCV once patient cleared for Anti-coagulation and CBC remained stable. During hospital course patient received total of 3 units pRBC : 2 Units of PRBC 4/26/2018 and 1 Unit of PRBC 4/29/2018. GI consulted and patient s/p EGD and Colonoscopy (4/30/2018): EGD: Clean base ulcer in duodenal bulb (likely culprit of bleed per GI; no intervention performed though as currently healing). C-Scope: external Hemorrhoids. Patient treated with Protonix 40 mg IV BID since admission with plan to transition to PO for 6-8 weeks on discharge. Hgb/HCT remained stable and patient resumed Anticoagulation Xarelto 20 mg PO Daily on 5/1/18 after being cleared by GI to do so. Patient observed on AC for 24 hrs to ensure no recurrence of bleed. CBC remained stable and patient underwent BRIAN 5/3 which revealed no clot in LA or EDWINA, Left atrial appendage emptying velocities mildly are reduced, normal wall motion and LV function. Patient s/p DCCV now in NSR and Toprol XL reduced to 25 mg PO Daily. Patient will likely have ablation in future.   Hospital course also significant for Acute Diastolic CHF for which patient diuresed with Lasix IV.-CT chest (4/27/2018): No effusion. -Echo (4/27/2018): mild AV thickening, mild LV thickening, mild MAC, trace MR, trace TR, LVEF 55-60%. No pericardial effusion. Lower extremity venous Duplex 04/27 no DVT seen.  Hospital course also significant for Transaminitis for which Lipitor was held x 72 hours and LFTs normalized. Limited RUQ ultrasound 4/30: Cholelithiasis. No biliary dilatation. Leukocytosis present on admission now resolved. Physical Therapy evaluated and recommends home with home PT 78 y/o man with hx lung Ca s/p RML resection (several years ago, followed at [facility], in remission as per patient),  s/p recent CABG at [facility] 3/27/18 (LIMA-LAD, ALY-RI, SVG-PDA) with uneventful course, since surgery has been with progressive dyspnea, generalized weakness and LE edema - had  recent admission to [facility] for above with CTA chest negative for PE, echo with EF 60-65%,  treated with lasix, discharged on 4/9/18 with Hgb/HCT at that time  9.2/28.6.  On 4/10/18 noted to be A fib and was started on xarelto and amiodarone.  On 4/26/18  sent from cardiologist office to [facility] for A flutter and worsened LE edema and dyspnea, found to be severely anemic with Hgb/HCT   6.2/22.7, VSS with /59,. HR 60-70's, RR 18, O2 sat 98% RA,  EKG with A flutter VR 64, troponin 0.02, BNP  2,579  and fluid overloaded on exam with mostly LE edema.  In ED treated with Lasix 40mg IVP x 1 with good UO and started RBC blood transfusion ( ordered for two units  with IV lasix in between).  Patient admitted to [facility] for further work-up.  CT Chest w/o to r/o post op bleed (CABG 3/2018): No acute thoracic pathology.   EP consulted and recommended BRIAN/DCCV once patient cleared for Anti-coagulation and CBC remained stable. During hospital course patient received total of 3 units pRBC : 2 Units of PRBC 4/26/2018 and 1 Unit of PRBC 4/29/2018. GI consulted and patient s/p EGD and Colonoscopy (4/30/2018): EGD: Clean base ulcer in duodenal bulb (likely culprit of bleed per GI; no intervention performed though as currently healing). C-Scope: external Hemorrhoids. Patient treated with Protonix 40 mg IV BID since admission with plan to transition to PO for 6-8 weeks on discharge. Hgb/HCT remained stable and patient resumed Anticoagulation Xarelto 20 mg PO Daily on 5/1/18 after being cleared by GI to do so. Patient observed on AC for 24 hrs to ensure no recurrence of bleed. CBC remained stable and patient underwent BRIAN 5/3 which revealed no clot in LA or EDWINA, Left atrial appendage emptying velocities mildly are reduced, normal wall motion and LV function. Patient s/p DCCV now in NSR and Toprol XL reduced to 25 mg PO Daily. Patient will likely have ablation in future.   Hospital course also significant for Acute Diastolic CHF for which patient diuresed with Lasix IV.-CT chest (4/27/2018): No effusion. -Echo (4/27/2018): mild AV thickening, mild LV thickening, mild MAC, trace MR, trace TR, LVEF 55-60%. No pericardial effusion. Lower extremity venous Duplex 04/27 no DVT seen.  Hospital course also significant for Transaminitis for which Lipitor was held x 72 hours and LFTs normalized. Limited RUQ ultrasound 4/30: Cholelithiasis. No biliary dilatation. Leukocytosis present on admission now resolved. CXR and CT Chest with no infiltrate. -UCX negative. Physical Therapy evaluated and recommends home with home PT 76 y/o man with hx lung Ca s/p RML resection (several years ago, followed at [facility], in remission as per patient),  s/p recent CABG at [facility] 3/27/18 (LIMA-LAD, ALY-RI, SVG-PDA) with uneventful course, since surgery has been with progressive dyspnea, generalized weakness and LE edema - had  recent admission to [facility] for above with CTA chest negative for PE, echo with EF 60-65%,  treated with lasix, discharged on 4/9/18 with Hgb/HCT at that time  9.2/28.6.  On 4/10/18 noted to be A fib and was started on xarelto and amiodarone.  On 4/26/18  sent from cardiologist office to [facility] for A flutter and worsened LE edema and dyspnea, found to be severely anemic with Hgb/HCT   6.2/22.7, VSS with /59,. HR 60-70's, RR 18, O2 sat 98% RA,  EKG with A flutter VR 64, troponin 0.02, BNP  2,579  and fluid overloaded on exam with mostly LE edema.  In ED treated with Lasix 40mg IVP x 1 with good UO and started RBC blood transfusion ( ordered for two units  with IV lasix in between). Patient admitted to [facility] for further work-up. CT Chest w/o to r/o post op bleed (CABG 3/2018): No acute thoracic pathology. EP consulted and recommended BRIAN/DCCV once patient cleared for Anti-coagulation and CBC remained stable. During hospital course patient received total of 3 units pRBC : 2 Units of PRBC 4/26/2018 and 1 Unit of PRBC 4/29/2018. GI consulted and patient s/p EGD and Colonoscopy (4/30/2018): EGD: Clean base ulcer in duodenal bulb (likely culprit of bleed per GI; no intervention performed though as currently healing). C-Scope: external Hemorrhoids. Patient treated with Protonix 40 mg IV BID since admission with plan to transition to PO for 6-8 weeks on discharge. Hgb/HCT remained stable and patient resumed Anticoagulation Xarelto 20 mg PO Daily on 5/1/18 after being cleared by GI to do so. Patient observed on AC for 24 hrs to ensure no recurrence of bleed. CBC remained stable and patient underwent BRIAN 5/3 which revealed no clot in LA or EDWINA, Left atrial appendage emptying velocities mildly are reduced, normal wall motion and LV function. Patient s/p DCCV now in NSR and Toprol XL reduced to 25 mg PO Daily. Patient will likely have ablation in future. Hospital course also significant for Acute Diastolic CHF for which patient diuresed with Lasix IV.-CT chest (4/27/2018): No effusion. -Echo (4/27/2018): mild AV thickening, mild LV thickening, mild MAC, trace MR, trace TR, LVEF 55-60%. No pericardial effusion. Lower extremity venous Duplex 04/27 no DVT seen. Hospital course also significant for Transaminitis for which Lipitor was held x 72 hours and LFTs normalized. Limited RUQ ultrasound 4/30: Cholelithiasis. No biliary dilatation. Leukocytosis present on admission now resolved. CXR and CT Chest with no infiltrate. -UCX negative. Physical Therapy evaluated and recommends home with home PT. Pt discharged on Lasix 40mg PO BID, Toprol XL 25mg daily, and Protonix 40mg daily. Appointment made with Dr Bonilla on 5/10/18 at 2pm. Pt has f/u appointment with Dr. Cruz on 5/23/18.

## 2018-04-28 NOTE — PROGRESS NOTE ADULT - PROBLEM SELECTOR PLAN 7
-Lives with his wife. reports he is in cardiac rehab.   -PT evaluation ordered    Dispo: F/U CBC @ 6 PM, Will need clear liquid diet (Sunday; will need to change order in [facility]) and Golytely at 6 PM Sunday, NPO after MN Sunday for EGD and colonoscopy Monday      Case discussed w/ Dr. Valentin

## 2018-04-28 NOTE — PROGRESS NOTE ADULT - SUBJECTIVE AND OBJECTIVE BOX
EPS Progress Note    S: No palps or SOB  T(C): 36.7 (04-28-18 @ 09:22), Max: 36.7 (04-28-18 @ 09:22)  HR: 75 (04-28-18 @ 07:38) (54 - 75)  BP: 125/59 (04-28-18 @ 07:38) (111/58 - 126/66)  RR: 18 (04-28-18 @ 07:38) (18 - 18)  SpO2: 100% (04-28-18 @ 07:38) (100% - 100%)  Wt(kg): --     Telemetry:           General:  NAD         Chest:  CTA B/L       Cardiac:  RRR      s1/s2         Abdomen:  +BS      Soft      Non-Tender      Non-Distended         Extremities:  LE Edema    2+ L>R     Labs:                                                               9.3    12.2  )-----------( 323      ( 28 Apr 2018 08:32 )             29.8     04-28    139  |  98  |  28<H>  ----------------------------<  108<H>  3.3<L>   |  31  |  1.01    Ca    8.9      28 Apr 2018 08:32  Mg     2.2     04-27    TPro  6.0  /  Alb  3.6  /  TBili  0.7  /  DBili  x   /  AST  49<H>  /  ALT  77<H>  /  AlkPhos  171<H>  04-26    PT/INR - ( 28 Apr 2018 08:32 )   PT: 15.3 sec;   INR: 1.37          PTT - ( 28 Apr 2018 08:32 )  PTT:27.1 sec    Assessment/Plan:  Anemia stable, anemia work up ongoing  LE edema no thrombus (preliminary)  Flutter rate controlled. DIscussed that need to restore sinus rhtyhm (DCC vs ablation) once anemia issues have resolved and he is a candidate for long term anticoagulaiton. This can be done as outpatient.

## 2018-04-28 NOTE — PROGRESS NOTE ADULT - PROBLEM SELECTOR PLAN 3
-EP recs: Flutter rate controlled, need to restore to SR w/ DCCV or ablation once anemia is resolved and started on long term AC as an outpatient   -New onset Atrial Flutter diagnosed 4/11/2018 as an outpatient. Was started on Xarelto and Amiodarone at that time.  -Holding Xarelto in the setting of acute anemia  -Currently rate controlled: Will continue Amiodarone 200 mg daily and Toprol Xl 50 mg daily.

## 2018-04-28 NOTE — DISCHARGE NOTE ADULT - ADDITIONAL INSTRUCTIONS
Follow-up with Dr. Aldana in 1 week.   Follow up Dr. Cruz 5/23/18 @ 11:00 am  [phone] Follow-up with Dr. Bonilla in 1 week.   Follow up Dr. Cruz 5/23/18 @ 11:00 am  [phone] Follow-up with Dr. Bonilla on 5/10/18 at 2pm [phone]   [address] [address]      Follow up Dr. Cruz 5/23/18 @ 11:00 am  [phone].   [address] [place] [address]

## 2018-04-28 NOTE — PROGRESS NOTE ADULT - SUBJECTIVE AND OBJECTIVE BOX
Pt seen and examined at bedside. LINDA overnight. No current complaints. Pt states that he had a normal brown BM in the AM, denies abdominal pain, nausea, vomiting, melena, hematochezia.     Allergies  No Known Allergies    MEDICATIONS:  MEDICATIONS  (STANDING):  amiodarone    Tablet 200 milliGRAM(s) Oral daily  atorvastatin 80 milliGRAM(s) Oral at bedtime  cholecalciferol 1000 Unit(s) Oral daily  cyanocobalamin 1000 MICROGram(s) Oral daily  furosemide   Injectable 40 milliGRAM(s) IV Push daily  latanoprost 0.005% Ophthalmic Solution 1 Drop(s) Both EYES at bedtime  metoprolol succinate ER 50 milliGRAM(s) Oral daily  pantoprazole  Injectable 40 milliGRAM(s) IV Push two times a day  timolol 0.5% Solution 1 Drop(s) Both EYES two times a day    MEDICATIONS  (PRN):    Vital Signs Last 24 Hrs  T(C): 36.7 (2018 09:22), Max: 36.7 (2018 09:22)  T(F): 98 (2018 09:22), Max: 98 (2018 09:22)  HR: 75 (2018 07:38) (54 - 75)  BP: 125/59 (2018 07:38) (111/58 - 130/60)  BP(mean): --  RR: 18 (2018 07:38) (18 - 20)  SpO2: 100% (2018 07:38) (100% - 100%)     @ : @ 07:00  --------------------------------------------------------  IN: 0 mL / OUT: 1050 mL / NET: -1050 mL     @ : @ 09:51  --------------------------------------------------------  IN: 120 mL / OUT: 0 mL / NET: 120 mL    PHYSICAL EXAM:    General: Well developed; well nourished; in no acute distress  HEENT: MMM, conjunctiva and sclera clear  Gastrointestinal: Soft non-tender non-distended;  No rebound or guarding  Skin: Warm and dry. No obvious rash    LABS:                        9.3    12.2  )-----------( 323      ( 2018 08:32 )             29.8         139  |  98  |  28<H>  ----------------------------<  108<H>  3.3<L>   |  31  |  1.01    Ca    8.9      2018 08:32  Mg     2.2         TPro  6.0  /  Alb  3.6  /  TBili  0.7  /  DBili  x   /  AST  49<H>  /  ALT  77<H>  /  AlkPhos  171<H>      PT/INR - ( 2018 08:32 )   PT: 15.3 sec;   INR: 1.37       PTT - ( 2018 08:32 )  PTT:27.1 sec    Urinalysis Basic - ( 2018 15:41 )    Color: Yellow / Appearance: Clear / S.010 / pH: x  Gluc: x / Ketone: NEGATIVE  / Bili: Negative / Urobili: 0.2 E.U./dL   Blood: x / Protein: NEGATIVE mg/dL / Nitrite: POSITIVE   Leuk Esterase: NEGATIVE / RBC: < 5 /HPF / WBC 5-10 /HPF   Sq Epi: x / Non Sq Epi: 0-5 /HPF / Bacteria: Present /HPF    RADIOLOGY & ADDITIONAL STUDIES:  US Duplex Venous Lower Ext Complete, Bilateral (18 @ 18:16)  FINDINGS:    Thigh veins: The common femoral, femoral, popliteal, proximal greater   saphenous, and proximal deep femoral veins are patent and free of   thrombus bilaterally. The veins are normally compressible and have normal   phasic flow and augmentation response.    Calf veins: The paired peroneal and posterior tibial calf veins are   patent bilaterally. Subcutaneous edema in the right thigh and left calf.    IMPRESSION:  No deep vein thrombosis seen.

## 2018-04-28 NOTE — DISCHARGE NOTE ADULT - NS MD DC FALL RISK RISK
For information on Fall & Injury Prevention, visit [email or web address]
DISPLAY PLAN FREE TEXT

## 2018-04-28 NOTE — DISCHARGE NOTE ADULT - CARE PROVIDER_API CALL
James Cruz), Cardiac Electrophysiology; Cardiology; Cardiovascular Disease  [address] [place] [address]  Phone: [phone]  Fax: [phone]    Sanchez Aldana), Cardiovascular Disease; Internal Medicine  [address] [address]  Phone: [phone]  Fax: [phone] James Cruz), Cardiac Electrophysiology; Cardiology; Cardiovascular Disease  [address] [place] [address]  Phone: [phone]  Fax: [phone]    Terrie Bonilla), Internal Medicine  [address] [id number]  Phone: [phone]  Fax: [phone] James Cruz), Cardiac Electrophysiology; Cardiology; Cardiovascular Disease  [address] [place] [address]  Phone: [phone]  Fax: [phone]    Terrie Bonilla), Internal Medicine  [address] [id number]  Phone: [phone]  Fax: [phone]    Alec Mendoza), Medicine  [address] [address]  Phone: [phone]  Fax: [phone]

## 2018-04-28 NOTE — DISCHARGE NOTE ADULT - HOME CARE AGENCY
[facility] [phone] [facility]- tel [phone]

## 2018-04-28 NOTE — PROGRESS NOTE ADULT - PROBLEM SELECTOR PLAN 6
-WBC: 14.8 on admission shift. Current WBC 12.2K  -Remains afebrile.   -CXR and CT Chest with no infiltrate.   -UA positive nitrite, Trace blood, WBC 5-10, Epithelial cells present, UCX negative

## 2018-04-28 NOTE — PROGRESS NOTE ADULT - PROBLEM SELECTOR PLAN 1
-H/H 6.8/22.7 on admission. Macro/normocytic anemia consistent with acute bleed (Iron panel normal but was drawn post transfusion).   -f/u iron panel in AM  -Guaiac positive in ED  -s/p 2 Units of PRBC 4/26/2018. H/H this AM: 9.3/29.8. Will maintain active Type and screen and continue to trend CBC q12hrs. F/U Repeat CBC @ 6pm  -Will continue to hold ASA and Xarelto.   -Stool occult positive   -Initial plan was for EGD 04/27 but anesthesiologist canceled case as they did not feel patient was optimized and would like troponin trended till it peaks. Per Dr. Bonilla patient remains cardiac cleared. In addition, Dr. Bonilla attempted to get in touch with anesthesiologist team multiple times 04/27 to discuss case with no call back. No further troponin trend per Dr. Bonilla  - Per GI EGD/Colonoscopy Monday. Will need clear liquid diet start Yosef Morning and Golytely @ 6 PM Sunday evening, NP after MN on Sunday  -Continue Protonix 40 mg IV BID.   -CT Chest w/o to r/o post op bleed (CABG 3/2018): No acute thoracic pathology. Small volume of complex/hemorrhagic pericardial fluid. Negative for pleural effusion. There is significant hypodensity of intracardiac blood, consistent with anemia. Mild edematous infiltration of the anterior mediastinum, likely postprocedural in nature with a very small fluid collection measuring 2.4 cm within the anterior mediastinum without air component -H/H 6.8/22.7 on admission. Macro/normocytic anemia consistent with acute bleed (Iron panel normal but was drawn post transfusion).   -f/u iron panel in AM  -Guaiac positive in ED  -s/p 2 Units of PRBC 4/26/2018. H/H this AM: 9.3/29.8. Will maintain active Type and screen and continue to trend CBC q12hrs. F/U Repeat CBC @ 6pm, goal Hgb >8  -Will continue to hold ASA and Xarelto.   -Stool occult positive   -Initial plan was for EGD 04/27 but anesthesiologist canceled case as they did not feel patient was optimized and would like troponin trended till it peaks. Per Dr. Bonilla patient remains cardiac cleared. In addition, Dr. Bonilla attempted to get in touch with anesthesiologist team multiple times 04/27 to discuss case with no call back. No further troponin trend per Dr. Bonilla  - Per GI EGD/Colonoscopy Monday. Will need clear liquid diet start Yosef Morning and Golytely @ 6 PM Sunday evening, NP after MN on Sunday  -Continue Protonix 40 mg IV BID.   -CT Chest w/o to r/o post op bleed (CABG 3/2018): No acute thoracic pathology. Small volume of complex/hemorrhagic pericardial fluid. Negative for pleural effusion. There is significant hypodensity of intracardiac blood, consistent with anemia. Mild edematous infiltration of the anterior mediastinum, likely postprocedural in nature with a very small fluid collection measuring 2.4 cm within the anterior mediastinum without air component

## 2018-04-28 NOTE — PROGRESS NOTE ADULT - SUBJECTIVE AND OBJECTIVE BOX
Interventional Cardiology PA Adult Progress Note    Subjective Assessment: Pt was examined at bedside this AM, feeling a lot better, denies any CP, SOB, palpitations, dizziness, fever, chills, abdominal pain, melena, N/V, hematemesis, or BRPR     	    MEDICATIONS:  amiodarone    Tablet 200 milliGRAM(s) Oral daily  furosemide   Injectable 40 milliGRAM(s) IV Push daily  metoprolol succinate ER 50 milliGRAM(s) Oral daily  pantoprazole  Injectable 40 milliGRAM(s) IV Push two times a day  atorvastatin 80 milliGRAM(s) Oral at bedtime  cholecalciferol 1000 Unit(s) Oral daily  cyanocobalamin 1000 MICROGram(s) Oral daily  latanoprost 0.005% Ophthalmic Solution 1 Drop(s) Both EYES at bedtime  timolol 0.5% Solution 1 Drop(s) Both EYES two times a day      	  [PHYSICAL EXAM:  TELEMETRY:  T(C): 36.7 (04-28-18 @ 09:22), Max: 36.7 (04-28-18 @ 09:22)  HR: 75 (04-28-18 @ 07:38) (54 - 75)  BP: 125/59 (04-28-18 @ 07:38) (111/58 - 126/66)  RR: 18 (04-28-18 @ 07:38) (18 - 18)  SpO2: 100% (04-28-18 @ 07:38) (100% - 100%)    I&O's Summary    27 Apr 2018 07:01  -  28 Apr 2018 07:00  --------------------------------------------------------  IN: 0 mL / OUT: 1050 mL / NET: -1050 mL    28 Apr 2018 07:01  -  28 Apr 2018 14:22  --------------------------------------------------------  IN: 120 mL / OUT: 0 mL / NET: 120 mL                                Gen: NAD, resting comfortable sitting upright in bed  Neck: +JVD b/l  Cardiac: +S1, S2, RRR, +sternotomy scar healing   Pulm: decreased breath sounds at base of lungs   Abdomen: BS present, soft, NT, ND  Extremities: + Cool to touch left leg from calf to toes. 3+ pitting edema b/l  Neuro: A+Ox3, no focal deficits. Pleasant and cooperative.      	      RADIOLOGY:     ECHO 04/27/2018: The left atrial size is normal. Right atrial size is normal. There is mild aortic valve thickening. No aortic regurgitation noted. No hemodynamically significant valvular aortic stenosis.  There is mild mitral valve thickening. There is mild mitral annular calcification. There is trace mitral regurgitation.  Structurally normal tricuspid valve. There is trace tricuspid regurgitation. There was insufficient TR detected from which to calculate pulmonary artery systolic pressure.  The pulmonic valve is not well visualized. No pulmonic regurgitation noted. The right ventricle is normal in size and function. Normal left ventricular size and wall thickness. Abnormal (paradoxical) septal motion consistent with abnormal conduction The other walls contract normally. The left ventricular ejection fraction is estimated to be 55-60%  No aortic root dilatation. No prior study for comparison.    CT chest 04/27/2018: Post procedural changes from recent CABG. No acute thoracic pathology.     US LE duplex 04/27/2018: No deep vein thrombosis seen.     CXR 04/26/2018:  Portable view of the chest is compared to 5/12/2016 and demonstrates median sternotomy. Normal heart size. Midline trachea. No consolidation. No pleural effusion. No significant change.      LABS:	 	    CARDIAC MARKERS ( 27 Apr 2018 06:41 )  x     / 0.03 ng/mL / x     / x     / x      CARDIAC MARKERS ( 26 Apr 2018 15:34 )  x     / 0.02 ng/mL / 114 U/L / x     / 5.5 ng/mL                            9.3    12.2  )-----------( 323      ( 28 Apr 2018 08:32 )             29.8     04-28    139  |  98  |  28<H>  ----------------------------<  108<H>  3.3<L>   |  31  |  1.01    Ca    8.9      28 Apr 2018 08:32  Mg     2.2     04-27    TPro  6.0  /  Alb  3.6  /  TBili  0.7  /  DBili  x   /  AST  49<H>  /  ALT  77<H>  /  AlkPhos  171<H>  04-26      HgA1c: Hemoglobin A1C, Whole Blood: 4.7 % (04-28 @ 08:32)      PT/INR - ( 28 Apr 2018 08:32 )   PT: 15.3 sec;   INR: 1.37          PTT - ( 28 Apr 2018 08:32 )  PTT:27.1 sec

## 2018-04-28 NOTE — DISCHARGE NOTE ADULT - [id number]_GEN_ALL_CORE
observation and assessment/teaching and training/medication teaching and assessment teaching and training/medication teaching and assessment/wound care and assessment/observation and assessment

## 2018-04-28 NOTE — PROGRESS NOTE ADULT - ASSESSMENT
76 y/o man with hx lung Ca s/p RML resection (several years ago, followed at [facility], in remission as per patient),  s/p recent CABG at [facility] 3/27/18 (LIMA-LAD, ALY-RI, SVG-PDA), diagnosed with Atrial Flutter (4/10/2018) for which Amiodarone and Xarelto were initiated was referred to [facility] ED (4/27/2018) and was found to be in acute diastolic CHF exacerbation with rate controlled Atrial Flutter in the setting of acute anemia. Patient for EGD and colonoscopy on Monday.

## 2018-04-28 NOTE — DISCHARGE NOTE ADULT - MEDICATION SUMMARY - MEDICATIONS TO STOP TAKING
I will STOP taking the medications listed below when I get home from the hospital:    Metoprolol Succinate ER 50 mg oral tablet, extended release  -- 1 tab(s) by mouth once a day    aspirin 81 mg oral tablet  -- 1 tab(s) by mouth once a day

## 2018-04-29 LAB
ALBUMIN SERPL ELPH-MCNC: 3.3 G/DL — SIGNIFICANT CHANGE UP (ref 3.3–5)
ALP SERPL-CCNC: 130 U/L — HIGH (ref 40–120)
ALT FLD-CCNC: 60 U/L — HIGH (ref 10–45)
ANION GAP SERPL CALC-SCNC: 11 MMOL/L — SIGNIFICANT CHANGE UP (ref 5–17)
ANION GAP SERPL CALC-SCNC: 12 MMOL/L — SIGNIFICANT CHANGE UP (ref 5–17)
AST SERPL-CCNC: 41 U/L — HIGH (ref 10–40)
BILIRUB DIRECT SERPL-MCNC: <0.2 MG/DL — SIGNIFICANT CHANGE UP (ref 0–0.2)
BILIRUB INDIRECT FLD-MCNC: >0.6 MG/DL — SIGNIFICANT CHANGE UP (ref 0.2–1)
BILIRUB SERPL-MCNC: 0.8 MG/DL — SIGNIFICANT CHANGE UP (ref 0.2–1.2)
BUN SERPL-MCNC: 26 MG/DL — HIGH (ref 7–23)
BUN SERPL-MCNC: 28 MG/DL — HIGH (ref 7–23)
CALCIUM SERPL-MCNC: 8.2 MG/DL — LOW (ref 8.4–10.5)
CALCIUM SERPL-MCNC: 8.6 MG/DL — SIGNIFICANT CHANGE UP (ref 8.4–10.5)
CHLORIDE SERPL-SCNC: 97 MMOL/L — SIGNIFICANT CHANGE UP (ref 96–108)
CHLORIDE SERPL-SCNC: 97 MMOL/L — SIGNIFICANT CHANGE UP (ref 96–108)
CO2 SERPL-SCNC: 29 MMOL/L — SIGNIFICANT CHANGE UP (ref 22–31)
CO2 SERPL-SCNC: 31 MMOL/L — SIGNIFICANT CHANGE UP (ref 22–31)
CREAT SERPL-MCNC: 0.92 MG/DL — SIGNIFICANT CHANGE UP (ref 0.5–1.3)
CREAT SERPL-MCNC: 0.98 MG/DL — SIGNIFICANT CHANGE UP (ref 0.5–1.3)
FERRITIN SERPL-MCNC: 256 NG/ML — SIGNIFICANT CHANGE UP (ref 30–400)
GLUCOSE SERPL-MCNC: 106 MG/DL — HIGH (ref 70–99)
GLUCOSE SERPL-MCNC: 148 MG/DL — HIGH (ref 70–99)
HCT VFR BLD CALC: 25.6 % — LOW (ref 39–50)
HCT VFR BLD CALC: 27 % — LOW (ref 39–50)
HGB BLD-MCNC: 7.7 G/DL — LOW (ref 13–17)
HGB BLD-MCNC: 8.2 G/DL — LOW (ref 13–17)
IRON SATN MFR SERPL: 15 % — LOW (ref 16–55)
IRON SATN MFR SERPL: 42 UG/DL — LOW (ref 45–165)
MAGNESIUM SERPL-MCNC: 2.2 MG/DL — SIGNIFICANT CHANGE UP (ref 1.6–2.6)
MCHC RBC-ENTMCNC: 30.1 G/DL — LOW (ref 32–36)
MCHC RBC-ENTMCNC: 30.4 G/DL — LOW (ref 32–36)
MCHC RBC-ENTMCNC: 30.4 PG — SIGNIFICANT CHANGE UP (ref 27–34)
MCHC RBC-ENTMCNC: 30.7 PG — SIGNIFICANT CHANGE UP (ref 27–34)
MCV RBC AUTO: 101.1 FL — HIGH (ref 80–100)
MCV RBC AUTO: 101.2 FL — HIGH (ref 80–100)
NT-PROBNP SERPL-SCNC: 1516 PG/ML — HIGH (ref 0–300)
PLATELET # BLD AUTO: 280 K/UL — SIGNIFICANT CHANGE UP (ref 150–400)
PLATELET # BLD AUTO: 289 K/UL — SIGNIFICANT CHANGE UP (ref 150–400)
POTASSIUM SERPL-MCNC: 3.2 MMOL/L — LOW (ref 3.5–5.3)
POTASSIUM SERPL-MCNC: 3.6 MMOL/L — SIGNIFICANT CHANGE UP (ref 3.5–5.3)
POTASSIUM SERPL-SCNC: 3.2 MMOL/L — LOW (ref 3.5–5.3)
POTASSIUM SERPL-SCNC: 3.6 MMOL/L — SIGNIFICANT CHANGE UP (ref 3.5–5.3)
PROT SERPL-MCNC: 5.8 G/DL — LOW (ref 6–8.3)
RBC # BLD: 2.53 M/UL — LOW (ref 4.2–5.8)
RBC # BLD: 2.67 M/UL — LOW (ref 4.2–5.8)
RBC # FLD: 21.2 % — HIGH (ref 10.3–16.9)
RBC # FLD: 21.9 % — HIGH (ref 10.3–16.9)
SODIUM SERPL-SCNC: 137 MMOL/L — SIGNIFICANT CHANGE UP (ref 135–145)
SODIUM SERPL-SCNC: 140 MMOL/L — SIGNIFICANT CHANGE UP (ref 135–145)
TIBC SERPL-MCNC: 283 UG/DL — SIGNIFICANT CHANGE UP (ref 220–430)
UIBC SERPL-MCNC: 241 UG/DL — SIGNIFICANT CHANGE UP (ref 110–370)
WBC # BLD: 9 K/UL — SIGNIFICANT CHANGE UP (ref 3.8–10.5)
WBC # BLD: 9.4 K/UL — SIGNIFICANT CHANGE UP (ref 3.8–10.5)
WBC # FLD AUTO: 9 K/UL — SIGNIFICANT CHANGE UP (ref 3.8–10.5)
WBC # FLD AUTO: 9.4 K/UL — SIGNIFICANT CHANGE UP (ref 3.8–10.5)

## 2018-04-29 PROCEDURE — 71045 X-RAY EXAM CHEST 1 VIEW: CPT | Mod: 26

## 2018-04-29 RX ORDER — IPRATROPIUM/ALBUTEROL SULFATE 18-103MCG
3 AEROSOL WITH ADAPTER (GRAM) INHALATION ONCE
Qty: 0 | Refills: 0 | Status: COMPLETED | OUTPATIENT
Start: 2018-04-29 | End: 2018-04-29

## 2018-04-29 RX ORDER — POTASSIUM CHLORIDE 20 MEQ
20 PACKET (EA) ORAL ONCE
Qty: 0 | Refills: 0 | Status: COMPLETED | OUTPATIENT
Start: 2018-04-29 | End: 2018-04-29

## 2018-04-29 RX ORDER — ZALEPLON 10 MG
5 CAPSULE ORAL AT BEDTIME
Qty: 0 | Refills: 0 | Status: DISCONTINUED | OUTPATIENT
Start: 2018-04-29 | End: 2018-05-04

## 2018-04-29 RX ORDER — POTASSIUM CHLORIDE 20 MEQ
60 PACKET (EA) ORAL ONCE
Qty: 0 | Refills: 0 | Status: COMPLETED | OUTPATIENT
Start: 2018-04-29 | End: 2018-04-29

## 2018-04-29 RX ADMIN — Medication 5 MILLIGRAM(S): at 00:56

## 2018-04-29 RX ADMIN — Medication 3 MILLILITER(S): at 11:42

## 2018-04-29 RX ADMIN — PREGABALIN 1000 MICROGRAM(S): 225 CAPSULE ORAL at 09:06

## 2018-04-29 RX ADMIN — Medication 40 MILLIGRAM(S): at 19:59

## 2018-04-29 RX ADMIN — PANTOPRAZOLE SODIUM 40 MILLIGRAM(S): 20 TABLET, DELAYED RELEASE ORAL at 06:02

## 2018-04-29 RX ADMIN — Medication 40 MILLIGRAM(S): at 06:08

## 2018-04-29 RX ADMIN — Medication 60 MILLIEQUIVALENT(S): at 09:06

## 2018-04-29 RX ADMIN — PANTOPRAZOLE SODIUM 40 MILLIGRAM(S): 20 TABLET, DELAYED RELEASE ORAL at 17:54

## 2018-04-29 RX ADMIN — Medication 1000 UNIT(S): at 09:06

## 2018-04-29 RX ADMIN — Medication 20 MILLIEQUIVALENT(S): at 17:54

## 2018-04-29 RX ADMIN — AMIODARONE HYDROCHLORIDE 200 MILLIGRAM(S): 400 TABLET ORAL at 06:02

## 2018-04-29 RX ADMIN — ATORVASTATIN CALCIUM 80 MILLIGRAM(S): 80 TABLET, FILM COATED ORAL at 21:48

## 2018-04-29 RX ADMIN — Medication 50 MILLIGRAM(S): at 07:46

## 2018-04-29 RX ADMIN — Medication 4000 MILLILITER(S): at 19:59

## 2018-04-29 NOTE — PROGRESS NOTE ADULT - SUBJECTIVE AND OBJECTIVE BOX
PA EVENT NOTE  -Repeat H/H 7.7/25.6 this afternoon. Pt to receive additional 1 Unit of blood followed by Lasix 40 mg IV x one dose as discussed with YARY Metzger.  Will repeat CBC 4 hours post transfusion. Case discussed with Dr. Valentin.

## 2018-04-29 NOTE — PROGRESS NOTE ADULT - PROBLEM SELECTOR PLAN 6
-WBC: 14.8 on admission shift. Leukocytosis resolved.   -Remains afebrile.   -CXR and CT Chest with no infiltrate.   -UA positive nitrite, Trace blood, WBC 5-10, epithelial cells present, UCX negative.

## 2018-04-29 NOTE — PROGRESS NOTE ADULT - PROBLEM SELECTOR PLAN 2
-BNP 2,579, REPEAT BNP today: 1,516. Daily weights, strict I/Os. On exam: 2-3+ leg edema, Left lower extremity cool to touch, + JVD, CTA with decreased BS b/l bases  -Patient complaining of SOB this afternoon; STAT CXR ordered to further assess. In addition, pt given DuoNeb x one dose. SOB likely multifactorial: CHF and anemic. (REPEAT BNP Improved from admission).   -CT chest (4/27/2018): No effusion.   -Echo (4/27/2018): mild AV thickening, mild LV thickening, mild MAC, trace MR, trace TR, LVEF 55-60%. No pericardial effusion.   -Continue Lasix 40 mg IV BID.  (if patient needs further transfusions may need additional Lasix)  -Leg Edema (3+ pitting and LE cool to touch): Lower extremity venous Duplex 04/27 no DVT seen.  -Hypokalemia: K: 3.2 this AM supplemented with Kdur 60 MEq orally x one dose. F/U BMP @ 2 PM. -BNP 2,579, REPEAT BNP today: 1,516. Daily weights, strict I/Os. On exam: 2-3+ leg edema, Left lower extremity cool to touch, + JVD, CTA with decreased BS b/l bases  -Patient complaining of SOB this afternoon; STAT CXR: no overtly overloaded (f/u official read). Pt given DuoNeb x one dose. SOB likely multifactorial: CHF and anemic. (REPEAT BNP Improved from admission).   -CT chest (4/27/2018): No effusion.   -Echo (4/27/2018): mild AV thickening, mild LV thickening, mild MAC, trace MR, trace TR, LVEF 55-60%. No pericardial effusion.   -Continue Lasix 40 mg IV BID.  (if patient needs further transfusions may need additional Lasix)  -Leg Edema (3+ pitting and LE cool to touch): Lower extremity venous Duplex 04/27 no DVT seen.  -Hypokalemia: k 3.2 this AM supplemented with Kdur 60 mg  PO x one dose, with repeat K 3.6 this afternoon supplemented with Kdur 20 meQ orally x one dose

## 2018-04-29 NOTE — PROGRESS NOTE ADULT - PROBLEM SELECTOR PLAN 7
-Lives with his wife. reports he is in cardiac rehab.   -PT evaluation ordered    Dispo: F/U CBC and BMP @ 2 PM. STAT CXR and RUQ US. NPO after midnight for EGD/C-Scope in AM.     Case discussed with GI team and Dr. Valentin (covering for Dr. Bonilla) -Lives with his wife. reports he is in cardiac rehab.   -PT evaluation ordered    Dispo: F/U RUQ US. NPO after midnight for EGD/C-Scope in AM.     Case discussed with GI team and Dr. Valentin (covering for Dr. Bonilla)

## 2018-04-29 NOTE — PROGRESS NOTE ADULT - SUBJECTIVE AND OBJECTIVE BOX
Interventional Cardiology PA Adult Progress Note    CC: SOB, leg edema, Aflutter, anemia.   Subjective Assessment: Pt seen and examined at bedside this morning. Pt reports after eating breakfast he feels slightly SOB. He denies chest pain, palpitations, dizziness, fever, chills, abdominal pain.     12 point review of system otherwise negative except per subjective.   	  MEDICATIONS:  amiodarone    Tablet 200 milliGRAM(s) Oral daily  furosemide   Injectable 40 milliGRAM(s) IV Push two times a day  metoprolol succinate ER 50 milliGRAM(s) Oral daily  zaleplon 5 milliGRAM(s) Oral at bedtime PRN  pantoprazole  Injectable 40 milliGRAM(s) IV Push two times a day  polyethylene glycol/electrolyte Solution. 4000 milliLiter(s) Oral once    atorvastatin 80 milliGRAM(s) Oral at bedtime    cholecalciferol 1000 Unit(s) Oral daily  cyanocobalamin 1000 MICROGram(s) Oral daily  latanoprost 0.005% Ophthalmic Solution 1 Drop(s) Both EYES at bedtime  timolol 0.5% Solution 1 Drop(s) Both EYES two times a day      	    [PHYSICAL EXAM:  TELEMETRY: Rate controlled Aflutter   T(C): 36.2 (04-29-18 @ 09:58), Max: 36.8 (04-28-18 @ 16:25)  HR: 61 (04-29-18 @ 11:31) (57 - 71)  BP: 95/55 (04-29-18 @ 11:31) (95/55 - 117/64)  RR: 20 (04-29-18 @ 11:31) (18 - 20)  SpO2: 100% (04-29-18 @ 11:31) (100% - 100%)  Wt(kg): --  I&O's Summary    28 Apr 2018 07:01  -  29 Apr 2018 07:00  --------------------------------------------------------  IN: 120 mL / OUT: 0 mL / NET: 120 mL    29 Apr 2018 07:01  -  29 Apr 2018 12:45  --------------------------------------------------------  IN: 300 mL / OUT: 850 mL / NET: -550 mL        Gen: NAD, resting comfortable sitting upright in bed  Neck: +JVD b/l  Cardiac: +S1, S2, RRR, +sternotomy scar healing   Pulm: Decreased breathsounds @ bases, otherwise CTA   Abdomen: BS present, soft, NT, ND  Extremities: + Cool to touch from calf to toes. 3+ pitting edema b/l  Neuro: A+Ox3, no focal deficits. Pleasant and cooperative.   	    LABS:	 	                                     8.2    9.0   )-----------( 280      ( 29 Apr 2018 06:48 )             27.0     04-29    140  |  97  |  28<H>  ----------------------------<  106<H>  3.2<L>   |  31  |  0.98    Ca    8.6      29 Apr 2018 06:48  Mg     2.2     04-29      proBNP: Serum Pro-Brain Natriuretic Peptide: 1516 pg/mL (04-29 @ 06:48)    Lipid Profile:   HgA1c:   TSH:   PT/INR - ( 28 Apr 2018 08:32 )   PT: 15.3 sec;   INR: 1.37          PTT - ( 28 Apr 2018 08:32 )  PTT:27.1 sec    ASSESSMENT/PLAN: 	        DVT ppx:  Dispo:

## 2018-04-29 NOTE — PROGRESS NOTE ADULT - ASSESSMENT
76 y/o man with hx lung Ca s/p RML resection (several years ago, [place] [facility] [place], in remission as per patient),  s/p recent CABG at [facility] 3/27/18 (LIMA-LAD, ALY-RI, SVG-PDA), diagnosed with Atrial Flutter (4/10/2018) for which Amiodarone and Xarelto were initiated was referred to [facility] ED (4/27/2018) and was found to be in acute diastolic CHF exacerbation with rate controlled Atrial Flutter in the setting of acute anemia. Patient is for EGD and colonoscopy on Monday.

## 2018-04-29 NOTE — PROGRESS NOTE ADULT - SUBJECTIVE AND OBJECTIVE BOX
Pt seen and examined at bedside. LINDA overnight. Pt reports one dark black BM today and SOB, denies abdominal pain, nausea, vomiting, hematochezia.     Allergies  No Known Allergies    MEDICATIONS:  MEDICATIONS  (STANDING):  amiodarone    Tablet 200 milliGRAM(s) Oral daily  atorvastatin 80 milliGRAM(s) Oral at bedtime  cholecalciferol 1000 Unit(s) Oral daily  cyanocobalamin 1000 MICROGram(s) Oral daily  furosemide   Injectable 40 milliGRAM(s) IV Push two times a day  latanoprost 0.005% Ophthalmic Solution 1 Drop(s) Both EYES at bedtime  metoprolol succinate ER 50 milliGRAM(s) Oral daily  pantoprazole  Injectable 40 milliGRAM(s) IV Push two times a day  polyethylene glycol/electrolyte Solution. 4000 milliLiter(s) Oral once  timolol 0.5% Solution 1 Drop(s) Both EYES two times a day    MEDICATIONS  (PRN):  zaleplon 5 milliGRAM(s) Oral at bedtime PRN Insomnia    Vital Signs Last 24 Hrs  T(C): 36.2 (2018 09:58), Max: 36.8 (2018 16:25)  T(F): 97.1 (2018 09:58), Max: 98.3 (2018 16:25)  HR: 61 (2018 11:31) (57 - 71)  BP: 95/55 (2018 11:31) (95/55 - 117/64)  BP(mean): --  RR: 20 (2018 11:31) (18 - 20)  SpO2: 100% (2018 11:31) (100% - 100%)     @ : @ 07:00  --------------------------------------------------------  IN: 120 mL / OUT: 0 mL / NET: 120 mL     @ 07: @ 12:17  --------------------------------------------------------  IN: 300 mL / OUT: 850 mL / NET: -550 mL    PHYSICAL EXAM:    General: Well developed; well nourished; in no acute distress  HEENT: MMM, conjunctiva and sclera clear  Gastrointestinal: Soft non-tender non-distended; No rebound or guarding  Skin: Warm and dry. No obvious rash  Extremities: B/L LE pitting edema    LABS:                        8.2    9.0   )-----------( 280      ( 2018 06:48 )             27.0     04-    140  |  97  |  28<H>  ----------------------------<  106<H>  3.2<L>   |  31  |  0.98    Ca    8.6      2018 06:48  Mg     2.2         PT/INR - ( 2018 08:32 )   PT: 15.3 sec;   INR: 1.37       PTT - ( 2018 08:32 )  PTT:27.1 sec    Urinalysis Basic - ( 2018 15:41 )    Color: Yellow / Appearance: Clear / S.010 / pH: x  Gluc: x / Ketone: NEGATIVE  / Bili: Negative / Urobili: 0.2 E.U./dL   Blood: x / Protein: NEGATIVE mg/dL / Nitrite: POSITIVE   Leuk Esterase: NEGATIVE / RBC: < 5 /HPF / WBC 5-10 /HPF   Sq Epi: x / Non Sq Epi: 0-5 /HPF / Bacteria: Present /HPF    Culture - Urine (collected 2018 21:59)  Source: .Urine Clean Catch (Midstream)  Final Report (2018 14:03):    No growth      RADIOLOGY & ADDITIONAL STUDIES: No new radiologic studies

## 2018-04-29 NOTE — PROGRESS NOTE ADULT - PROBLEM SELECTOR PLAN 4
-s/p 3VCABG: 3/27/18 (LIMA-LAD, ALY-RI, SVG-PDA) at [place]  -CP free this AM. Troponin 0.02--0.03. No further troponin trend per Dr. Bonilla.   -Holding ASA in the setting of acute anemia.   -Continue Lipitor 80 mg daily, Toprol XL 50 mg daily  - LDL: 33 and Hemoglobin A1c 4.7.

## 2018-04-29 NOTE — PROGRESS NOTE ADULT - PROBLEM SELECTOR PLAN 1
-H/H 6.8/22.7 on admission. Macro/normocytic anemia consistent with acute bleed   -Guaiac positive in ED, Stool occult positive as well.   -s/p 2 Units of PRBC 4/26/2018. Goal Hemoglobin >8.0. H/H: 8.2/27.0. Will continue to trend CBC q12 hours. F/U CBC @ 2 PM.   -Continue to maintain active Type and Screen   -Continue to hold ASA/Xarelto therapy.   -Initial plan was for EGD 04/27 but anesthesiologist canceled case as they did not feel patient was optimized and would like troponin trended till it peaks (no further trend per cardiology attending). Per Dr. Bonilla patient remains cardiac cleared  - EGD/Colonoscopy tomorrow. Clear liquid diet today. Golytely @ 6 PM tonight. NPO after midnight.   -Continue Protonix 40 mg IV BID.   -CT Chest w/o to r/o post op bleed (CABG 3/2018): No acute thoracic pathology.   -Elevated LFTs (on statin therapy/in addition may be hepatic congestion from CHF): Limited RUQ ultrasound ordered -H/H 6.8/22.7 on admission. Macro/normocytic anemia consistent with acute bleed   -Guaiac positive in ED, Stool occult positive as well.   -s/p 2 Units of PRBC 4/26/2018. Goal Hemoglobin >8.0. Repeat H/H this afternoon at 2 PM: 7.7/25.6. Patient will receive additional 1 Unit of PRBC followed by Lasix 40 mg IV x one dose as discussed with YARY Metzger. F/U POST TRANSFUSION CBC 4 HOURS AFTER COMPLETION  -Continue to maintain active Type and Screen   -Continue to hold ASA/Xarelto therapy.   -Initial plan was for EGD 04/27 but anesthesiologist canceled case as they did not feel patient was optimized and would like troponin trended till it peaks (no further trend per cardiology attending). Per Dr. Bonilla patient remains cardiac cleared  - EGD/Colonoscopy tomorrow. Clear liquid diet today. Golytely @ 6 PM tonight. NPO after midnight.   -Continue Protonix 40 mg IV BID.   -CT Chest w/o to r/o post op bleed (CABG 3/2018): No acute thoracic pathology.   -Elevated LFTs (on statin therapy/in addition may be hepatic congestion from CHF): Limited RUQ ultrasound ordered-Repeat H/H 7.7/25.6 this afternoon. Pt to receive additional 1 Unit of blood followed by Lasix 40 mg IV x one dose as discussed with YARY Metzger.  Will repeat CBC 4 hours post transfusion. Case discussed with Dr. Valentin.

## 2018-04-29 NOTE — PROGRESS NOTE ADULT - ASSESSMENT
76 yo M PMH of Lung CA s/p RML resection, CAD s/p recent CABG (3/31), DCed on 4/9 outside hospital with hb 9.2, recently started on Xarelto for afib on 4/10, who was sent in by outpatient cardiologist for anemia hbg of 6.2 c/o melena.     # Acute blood loss anemia 2/2 PUD vs AVMs vs gastritis vs malignancy  - s/p 2 u PRBCs  - H/H downtrending today, however, HD stable  - C/w Protonix 40 mg IVP BID  - Monitor H/H at least Q8H, transfuse Hgb <8  - Per cardiology, pt is optimized for EGD/colonoscopy  - Will plan for EGD and colonoscopy tomorrow  - CLD today  - Golytely 4L prep starting at 6 PM Sunday evening, NPO p MN Sunday  - If e/o overt GI bleeding (i.e. melena, hematochezia, hematemesis) please notify GI fellow on call to consider urgent EGD +/- colonoscopy    # Elevated transaminases  - Pt with previously normal LFTs, likely elevated in the setting of statin use  - Please monitor daily LFTs  - Check RUQ U/S with doppler  - Will consider further CLD work-up pending above    # SOB 2/2 symptomatic anemia vs fluid overload  - Monitor H/H per above  - Ordered for CXR  - HF work-up per primary    Case d/w Dr. Evelyn HENDRICKSON will follow

## 2018-04-30 ENCOUNTER — RESULT REVIEW (OUTPATIENT)
Age: 78
End: 2018-04-30

## 2018-04-30 DIAGNOSIS — R79.89 OTHER SPECIFIED ABNORMAL FINDINGS OF BLOOD CHEMISTRY: ICD-10-CM

## 2018-04-30 LAB
ALBUMIN SERPL ELPH-MCNC: 3.5 G/DL — SIGNIFICANT CHANGE UP (ref 3.3–5)
ALP SERPL-CCNC: 145 U/L — HIGH (ref 40–120)
ALT FLD-CCNC: 78 U/L — HIGH (ref 10–45)
ANION GAP SERPL CALC-SCNC: 10 MMOL/L — SIGNIFICANT CHANGE UP (ref 5–17)
APTT BLD: 31.2 SEC — SIGNIFICANT CHANGE UP (ref 27.5–37.4)
AST SERPL-CCNC: 51 U/L — HIGH (ref 10–40)
BILIRUB DIRECT SERPL-MCNC: 0.4 MG/DL — HIGH (ref 0–0.2)
BILIRUB INDIRECT FLD-MCNC: 1.4 MG/DL — HIGH (ref 0.2–1)
BILIRUB SERPL-MCNC: 1.8 MG/DL — HIGH (ref 0.2–1.2)
BUN SERPL-MCNC: 21 MG/DL — SIGNIFICANT CHANGE UP (ref 7–23)
CALCIUM SERPL-MCNC: 8.9 MG/DL — SIGNIFICANT CHANGE UP (ref 8.4–10.5)
CHLORIDE SERPL-SCNC: 95 MMOL/L — LOW (ref 96–108)
CO2 SERPL-SCNC: 35 MMOL/L — HIGH (ref 22–31)
CREAT SERPL-MCNC: 0.94 MG/DL — SIGNIFICANT CHANGE UP (ref 0.5–1.3)
GLUCOSE SERPL-MCNC: 100 MG/DL — HIGH (ref 70–99)
HCT VFR BLD CALC: 30.6 % — LOW (ref 39–50)
HCT VFR BLD CALC: 31.6 % — LOW (ref 39–50)
HGB BLD-MCNC: 9.6 G/DL — LOW (ref 13–17)
HGB BLD-MCNC: 9.8 G/DL — LOW (ref 13–17)
INR BLD: 1.26 — HIGH (ref 0.88–1.16)
MAGNESIUM SERPL-MCNC: 2.1 MG/DL — SIGNIFICANT CHANGE UP (ref 1.6–2.6)
MCHC RBC-ENTMCNC: 30.5 PG — SIGNIFICANT CHANGE UP (ref 27–34)
MCHC RBC-ENTMCNC: 30.7 PG — SIGNIFICANT CHANGE UP (ref 27–34)
MCHC RBC-ENTMCNC: 31 G/DL — LOW (ref 32–36)
MCHC RBC-ENTMCNC: 31.4 G/DL — LOW (ref 32–36)
MCV RBC AUTO: 97.1 FL — SIGNIFICANT CHANGE UP (ref 80–100)
MCV RBC AUTO: 99.1 FL — SIGNIFICANT CHANGE UP (ref 80–100)
PLATELET # BLD AUTO: 293 K/UL — SIGNIFICANT CHANGE UP (ref 150–400)
PLATELET # BLD AUTO: 304 K/UL — SIGNIFICANT CHANGE UP (ref 150–400)
POTASSIUM SERPL-MCNC: 3.4 MMOL/L — LOW (ref 3.5–5.3)
POTASSIUM SERPL-SCNC: 3.4 MMOL/L — LOW (ref 3.5–5.3)
PROT SERPL-MCNC: 6 G/DL — SIGNIFICANT CHANGE UP (ref 6–8.3)
PROTHROM AB SERPL-ACNC: 14 SEC — HIGH (ref 9.8–12.7)
RBC # BLD: 3.15 M/UL — LOW (ref 4.2–5.8)
RBC # BLD: 3.19 M/UL — LOW (ref 4.2–5.8)
RBC # FLD: 20.7 % — HIGH (ref 10.3–16.9)
RBC # FLD: 20.9 % — HIGH (ref 10.3–16.9)
SODIUM SERPL-SCNC: 140 MMOL/L — SIGNIFICANT CHANGE UP (ref 135–145)
WBC # BLD: 10 K/UL — SIGNIFICANT CHANGE UP (ref 3.8–10.5)
WBC # BLD: 8.7 K/UL — SIGNIFICANT CHANGE UP (ref 3.8–10.5)
WBC # FLD AUTO: 10 K/UL — SIGNIFICANT CHANGE UP (ref 3.8–10.5)
WBC # FLD AUTO: 8.7 K/UL — SIGNIFICANT CHANGE UP (ref 3.8–10.5)

## 2018-04-30 PROCEDURE — 45378 DIAGNOSTIC COLONOSCOPY: CPT | Mod: GC

## 2018-04-30 PROCEDURE — 76705 ECHO EXAM OF ABDOMEN: CPT | Mod: 26

## 2018-04-30 PROCEDURE — 99233 SBSQ HOSP IP/OBS HIGH 50: CPT

## 2018-04-30 PROCEDURE — 43239 EGD BIOPSY SINGLE/MULTIPLE: CPT | Mod: GC

## 2018-04-30 RX ORDER — POTASSIUM CHLORIDE 20 MEQ
40 PACKET (EA) ORAL ONCE
Qty: 0 | Refills: 0 | Status: COMPLETED | OUTPATIENT
Start: 2018-04-30 | End: 2018-04-30

## 2018-04-30 RX ADMIN — Medication 40 MILLIEQUIVALENT(S): at 09:27

## 2018-04-30 RX ADMIN — Medication 40 MILLIGRAM(S): at 18:50

## 2018-04-30 RX ADMIN — Medication 1000 UNIT(S): at 11:38

## 2018-04-30 RX ADMIN — PANTOPRAZOLE SODIUM 40 MILLIGRAM(S): 20 TABLET, DELAYED RELEASE ORAL at 18:50

## 2018-04-30 RX ADMIN — Medication 50 MILLIGRAM(S): at 05:37

## 2018-04-30 RX ADMIN — PREGABALIN 1000 MICROGRAM(S): 225 CAPSULE ORAL at 11:38

## 2018-04-30 RX ADMIN — PANTOPRAZOLE SODIUM 40 MILLIGRAM(S): 20 TABLET, DELAYED RELEASE ORAL at 05:37

## 2018-04-30 RX ADMIN — Medication 40 MILLIGRAM(S): at 05:37

## 2018-04-30 RX ADMIN — Medication 5 MILLIGRAM(S): at 23:48

## 2018-04-30 RX ADMIN — AMIODARONE HYDROCHLORIDE 200 MILLIGRAM(S): 400 TABLET ORAL at 05:37

## 2018-04-30 NOTE — PROGRESS NOTE ADULT - PROBLEM SELECTOR PLAN 8
-Lives with his wife. reports he is in cardiac rehab.   -PT evaluation ordered    Dispo: F/U RUQ US. F/U EGD/C-Scope results today.     Case discussed with Dr. Bonilla this AM. -Lives with his wife. reports he is in cardiac rehab.   -PT evaluation ordered    Dispo: F/U RUQ US. Discuss DCCV vs ablation with EP tomorrow (cleared for AC starting tomorrow per GI team).     Case discussed with Dr. Bonilla this AM.

## 2018-04-30 NOTE — ADVANCED PRACTICE NURSE CONSULT - RECOMMEDATIONS
Denuded skin - apply zinc based moisture barrier cream twice daily and prn if soiled or wet.   Informed the patient, YARY Yoo and Amanda HOLLAND of the same.

## 2018-04-30 NOTE — PROGRESS NOTE ADULT - PROBLEM SELECTOR PLAN 3
-EP recs: Flutter rate controlled, need to restore to SR w/ DCCV or ablation once anemia is resolved and started on long term AC as an outpatient   -New onset Atrial Flutter diagnosed 4/11/2018 as an outpatient. Was started on Xarelto and Amiodarone at that time.  -Holding Xarelto in the setting of acute anemia  -Currently rate controlled: Will continue Amiodarone 200 mg daily and Toprol Xl 50 mg daily. -BNP 2,579-- REPEAT BNP (4/29/2018): 1,516. Daily weights, strict I/Os. On exam: leg edema slightly improved since admission. Lungs with faint bibasilar crackles.   -Patient was complaining of SOB yesterday which has resolved today; likely related to CHF and acute anemia. STAT CXR preliminary read (4/29/2018): no overt congestion. F/U official read  -CT chest (4/27/2018): No effusion.   -Echo (4/27/2018): mild AV thickening, mild LV thickening, mild MAC, trace MR, trace TR, LVEF 55-60%. No pericardial effusion.   -Continue Lasix 40 mg IV BID.  (if patient needs further transfusions may need additional Lasix)  -Leg Edema (3+ pitting and LE cool to touch): Lower extremity venous Duplex 04/27 no DVT seen.  -Hypokalemia: k 3.3.4 this AM supplemented with Kdur 40 mg PO x one dose. Magnesium WNL

## 2018-04-30 NOTE — ADVANCED PRACTICE NURSE CONSULT - ASSESSMENT
Patient presented with denuded skin to bilateral inner buttocks and scrotum, area of severely denuded skin noted to right inner buttocks. Skin denudation secondary to bowel incontinence, not pressure related.   Applied zinc based moisture barrier cream to denuded skin. Instructed patient on purpose of applying moisture barrier cream to heal denuded skin, The patient verbalized understanding.

## 2018-04-30 NOTE — PROGRESS NOTE ADULT - PROBLEM SELECTOR PLAN 1
-H/H 6.8/22.7 on admission. Macro/normocytic anemia consistent with acute bleed   -Guaiac positive in ED, Stool occult positive as well.   -Total of 3 Units of PRBC thus far: s/p 2 Units of PRBC 4/26/2018 and 1 Unit of PRBC 4/29/2018. Current H/H stable: 9.8/31.5 this AM; will continue to trend BID. Goal Hemoglobin >8.0. -Continue to maintain active Type and Screen   -Continue to hold ASA/Xarelto therapy.   -Plan for EGD and Colonoscopy today: F/U results   -Continue Protonix 40 mg IV BID.   -CT Chest w/o to r/o post op bleed (CABG 3/2018): No acute thoracic pathology.   -Elevated LFTs (on statin therapy/in addition may be hepatic congestion from CHF): Limited RUQ ultrasound ordered. In addition will hold patient’s Lipitor 80 mg daily x 48 hours starting today to see if LFTs improve -H/H 6.8/22.7 on admission. Macro/normocytic anemia consistent with acute bleed   -Guaiac positive in ED, Stool occult positive as well.   -Total of 3 Units of PRBC thus far: s/p 2 Units of PRBC 4/26/2018 and 1 Unit of PRBC 4/29/2018. Current H/H stable: 9.8/31.5 this AM; will continue to trend BID. Goal Hemoglobin >8.0. -Continue to maintain active Type and Screen   -Continue to hold ASA/Xarelto therapy.   -Plan for EGD and Colonoscopy today: F/U results   -Continue Protonix 40 mg IV BID.   -CT Chest w/o to r/o post op bleed (CABG 3/2018): No acute thoracic pathology. -H/H 6.8/22.7 on admission. Macro/normocytic anemia consistent with acute bleed   -Guaiac positive in ED, Stool occult positive as well.   -Total of 3 Units of PRBC thus far: s/p 2 Units of PRBC 4/26/2018 and 1 Unit of PRBC 4/29/2018. Current H/H stable: 9.8/31.5 this AM. Goal Hemoglobin >8.0.   -s/p EGD and Colonoscopy today (4/30/2018): EGD: Clean base ulcer in duodenal bulb (likely culprit of bleed per GI; no intervention performed though as currently healing). C-Scope: external Hemorrhoids. H.Pylori sent. To continue IV Protonix 40 mg BID until discharge per GI with plan to transition to PO for 6-8 weeks. Patient cleared to start anticoagulation tomorrow per GI team.   -CT Chest w/o to r/o post op bleed (CABG 3/2018): No acute thoracic pathology.

## 2018-04-30 NOTE — PROGRESS NOTE ADULT - ASSESSMENT
76 y/o man with hx lung Ca s/p RML resection (several years ago, followed at [facility], in remission as per patient),  s/p recent CABG at [facility] 3/27/18 (LIMA-LAD, ALY-RI, SVG-PDA), diagnosed with Atrial Flutter (4/10/2018) for which Amiodarone and Xarelto were initiated was referred to [facility] ED (4/27/2018) and was found to be in acute diastolic CHF exacerbation with rate controlled Atrial Flutter in the setting of acute anemia. Patient is for EGD and colonoscopy today. 78 y/o man with hx lung Ca s/p RML resection (several years ago, followed at [facility], in remission as per patient),  s/p recent CABG at [facility] 3/27/18 (LIMA-LAD, ALY-RI, SVG-PDA), diagnosed with Atrial Flutter (4/10/2018) for which Amiodarone and Xarelto were initiated was referred to [facility] ED (4/27/2018) and was found to be in acute diastolic CHF exacerbation with rate controlled Atrial Flutter in the setting of acute anemia. Patient is now s/p EGD and C-Scope today revealing clean base ulcer in duodenal bulb (likely cause of bleed). Patient will be cleared to start anticoagulation tomorrow per GI with plan for DCCV vs ablation Wednesday.

## 2018-04-30 NOTE — PROGRESS NOTE ADULT - PROBLEM SELECTOR PLAN 6
-WBC: 14.8 on admission shift. Leukocytosis resolved.   -Remains afebrile.   -CXR and CT Chest with no infiltrate.   -UCX negative. -Continue Toprol Xl 50 mg daily.

## 2018-04-30 NOTE — PROGRESS NOTE ADULT - SUBJECTIVE AND OBJECTIVE BOX
Interventional Cardiology PA Adult Progress Note    CC: SOB  Subjective Assessment: Patient seen and examined at bedside this morning. Pt reports he feels anxious this morning about upcoming GI procedure. Does admit SOB is better this morning and feels as though leg edema has improved. Pt denies any current chest pain, palpitations, dizziness, fever, chills, PND, orthopnea,     12 point review of system otherwise negative except per subjective.   	  MEDICATIONS:  amiodarone    Tablet 200 milliGRAM(s) Oral daily  furosemide   Injectable 40 milliGRAM(s) IV Push two times a day  metoprolol succinate ER 50 milliGRAM(s) Oral daily  zaleplon 5 milliGRAM(s) Oral at bedtime PRN  pantoprazole  Injectable 40 milliGRAM(s) IV Push two times a day  atorvastatin 80 milliGRAM(s) Oral at bedtime  cholecalciferol 1000 Unit(s) Oral daily  cyanocobalamin 1000 MICROGram(s) Oral daily  latanoprost 0.005% Ophthalmic Solution 1 Drop(s) Both EYES at bedtime  timolol 0.5% Solution 1 Drop(s) Both EYES two times a day    [PHYSICAL EXAM:  TELEMETRY:  T(C): 36.7 (04-30-18 @ 08:49), Max: 36.8 (04-30-18 @ 06:06)  HR: 63 (04-30-18 @ 08:30) (54 - 66)  BP: 112/55 (04-30-18 @ 08:30) (95/55 - 137/68)  RR: 18 (04-30-18 @ 08:30) (18 - 20)  SpO2: 97% (04-30-18 @ 08:30) (97% - 100%)  Wt(kg): --  I&O's Summary    29 Apr 2018 07:01  -  30 Apr 2018 07:00  --------------------------------------------------------  IN: 900 mL / OUT: 2400 mL / NET: -1500 mL    30 Apr 2018 07:01  -  30 Apr 2018 10:03  --------------------------------------------------------  IN: 180 mL / OUT: 600 mL / NET: -420 mL    Gen: NAD, Resting comfortable in bed  Neck: No JVD b/l  Cardiac: +S1, S2, RRR   Pulm: Faint bibasilar crackles.  Abdomen: BS present, soft, NT, ND  Extremities: 2+ pitting edema (L>R), improved compared to admission. Left foot warmer to touch (compared to admission)  Neuro: A+Ox3.     LABS:	 	                                   9.8    8.7   )-----------( 304      ( 30 Apr 2018 07:24 )             31.6     04-30    140  |  95<L>  |  21  ----------------------------<  100<H>  3.4<L>   |  35<H>  |  0.94    Ca    8.9      30 Apr 2018 07:24  Mg     2.1     04-30    TPro  6.0  /  Alb  3.5  /  TBili  1.8<H>  /  DBili  0.4<H>  /  AST  51<H>  /  ALT  78<H>  /  AlkPhos  145<H>  04-30        PT/INR - ( 30 Apr 2018 07:24 )   PT: 14.0 sec;   INR: 1.26          PTT - ( 30 Apr 2018 07:24 )  PTT:31.2 sec

## 2018-04-30 NOTE — PROGRESS NOTE ADULT - PROBLEM SELECTOR PLAN 4
-s/p 3VCABG: 3/27/18 (LIMA-LAD, ALY-RI, SVG-PDA) at [place]  -CP free this AM. Troponin 0.02--0.03. No further troponin trend per Dr. Bonilla.   -Holding ASA in the setting of acute anemia.   -Continue Toprol XL 50 mg daily. Will hold Lipitor 80 mg daily for 48 hours in the setting of elevated LFTs.   - LDL: 33 and Hemoglobin A1c 4.7. -EP recs: Flutter rate controlled, need to restore to SR w/ DCCV or ablation once anemia is resolved and started on long term AC as an outpatient   -New onset Atrial Flutter diagnosed 4/11/2018 as an outpatient. Was started on Xarelto and Amiodarone at that time.  -Holding Xarelto in the setting of acute anemia  -Currently rate controlled: Will continue Amiodarone 200 mg daily and Toprol Xl 50 mg daily. -EP recs: Flutter rate controlled, need to restore to SR w/ DCCV or ablation once anemia is resolved and started on long term AC as an outpatient,  -GI team has cleared patient for anticoagulation starting tomorrow per discussion with GI fellow this evening.   -Notify EP tomorrow (as results of scopes today done after 5 PM); that patient can start AC. Patient will likely go for DCCV vs ablation this admission (possible Wednesday)  -Currently rate controlled: Will continue Amiodarone 200 mg daily and Toprol Xl 50 mg daily.

## 2018-04-30 NOTE — ADVANCED PRACTICE NURSE CONSULT - REASON FOR CONSULT
WOC nurse consult for 78 y/o man with hx lung Ca s/p RML resection (several years ago, followed at [facility], in remission as per patient),  s/p recent CABG at [facility] 3/27/18 (LIMA-LAD, ALY-RI, SVG-PDA) with uneventful course, since surgery has been with progressive dyspnea, generalized weakness and LE edema - had  recent admission to [facility] for above with CTA chest negative for PE, echo with EF 60-65%,  treated with lasix, discharged on 4/9/18 with H/H at that time  9.2/28.6.  On 4/10/18 noted to be A fib and was started on xarelto and amiodarone.  Patient sent from cardiologist office to [facility] ED for A flutter and worsened LE edema and dyspnea, found to be severely anemic with Hgb  6.2/22.7, VSS with /59,. HR 60-70's, RR 18, O2 sat 98% RA,  EKG with A flutter VR 64, troponin 0.02, BNP  2,579  and fluid overloaded on exam with mostly LE edema.  In ED treated with Lasix 40mg IVP x 1 with good UO and started RBC blood transfusion ( ordered for two units  with IV lasix in between).  Patient is now admitted to 5U telemetry for further w/up and management including blood transfusion, ECHO/BRIAN, GI eval and EP eval with possible CTS eval (chest CT ordered to R/O bleed).

## 2018-04-30 NOTE — PROGRESS NOTE ADULT - PROBLEM SELECTOR PLAN 5
-Continue Toprol Xl 50 mg daily. -s/p 3VCABG: 3/27/18 (LIMA-LAD, ALY-RI, SVG-PDA) at [place]  -CP free this AM. Troponin 0.02--0.03. No further troponin trend per Dr. Bonilla.   -Holding ASA in the setting of acute anemia.   -Continue Toprol XL 50 mg daily. Will hold Lipitor 80 mg daily for 48 hours in the setting of elevated LFTs.   - LDL: 33 and Hemoglobin A1c 4.7.

## 2018-04-30 NOTE — PROGRESS NOTE ADULT - PROBLEM SELECTOR PLAN 7
-Lives with his wife. reports he is in cardiac rehab.   -PT evaluation ordered    Dispo: F/U RUQ US. F/U EGD/C-Scope results today.     Case discussed with Dr. Bonilla this AM. -WBC: 14.8 on admission shift. Leukocytosis resolved.   -Remains afebrile.   -CXR and CT Chest with no infiltrate.   -UCX negative.

## 2018-04-30 NOTE — PROGRESS NOTE ADULT - PROBLEM SELECTOR PLAN 2
-BNP 2,579-- REPEAT BNP (4/29/2018): 1,516. Daily weights, strict I/Os. On exam: leg edema slightly improved since admission. Lungs with faint bibasilar crackles.   -Patient was complaining of SOB yesterday which has resolved today; likely related to CHF and acute anemia. STAT CXR preliminary read (4/29/2018): no overt congestion. F/U official read  -CT chest (4/27/2018): No effusion.   -Echo (4/27/2018): mild AV thickening, mild LV thickening, mild MAC, trace MR, trace TR, LVEF 55-60%. No pericardial effusion.   -Continue Lasix 40 mg IV BID.  (if patient needs further transfusions may need additional Lasix)  -Leg Edema (3+ pitting and LE cool to touch): Lower extremity venous Duplex 04/27 no DVT seen.  -Hypokalemia: k 3.3.4 this AM supplemented with Kdur 40 mg PO x one dose. Magnesium WNL -Elevated LFTs (on statin therapy/in addition may be hepatic congestion from CHF): Limited RUQ ultrasound ordered.   -In addition will hold patient’s Lipitor 80 mg daily x 48 hours starting today to see if LFTs improve  -Will continue to trend daily.

## 2018-05-01 LAB
ALBUMIN SERPL ELPH-MCNC: 3.5 G/DL — SIGNIFICANT CHANGE UP (ref 3.3–5)
ALP SERPL-CCNC: 130 U/L — HIGH (ref 40–120)
ALT FLD-CCNC: 63 U/L — HIGH (ref 10–45)
ANION GAP SERPL CALC-SCNC: 10 MMOL/L — SIGNIFICANT CHANGE UP (ref 5–17)
AST SERPL-CCNC: 35 U/L — SIGNIFICANT CHANGE UP (ref 10–40)
BILIRUB DIRECT SERPL-MCNC: 0.2 MG/DL — SIGNIFICANT CHANGE UP (ref 0–0.2)
BILIRUB INDIRECT FLD-MCNC: 0.6 MG/DL — SIGNIFICANT CHANGE UP (ref 0.2–1)
BILIRUB SERPL-MCNC: 0.8 MG/DL — SIGNIFICANT CHANGE UP (ref 0.2–1.2)
BUN SERPL-MCNC: 27 MG/DL — HIGH (ref 7–23)
CALCIUM SERPL-MCNC: 9.1 MG/DL — SIGNIFICANT CHANGE UP (ref 8.4–10.5)
CHLORIDE SERPL-SCNC: 93 MMOL/L — LOW (ref 96–108)
CO2 SERPL-SCNC: 35 MMOL/L — HIGH (ref 22–31)
CREAT SERPL-MCNC: 1.05 MG/DL — SIGNIFICANT CHANGE UP (ref 0.5–1.3)
GLUCOSE SERPL-MCNC: 108 MG/DL — HIGH (ref 70–99)
HCT VFR BLD CALC: 32.2 % — LOW (ref 39–50)
HGB BLD-MCNC: 9.9 G/DL — LOW (ref 13–17)
MAGNESIUM SERPL-MCNC: 2.1 MG/DL — SIGNIFICANT CHANGE UP (ref 1.6–2.6)
MCHC RBC-ENTMCNC: 30.4 PG — SIGNIFICANT CHANGE UP (ref 27–34)
MCHC RBC-ENTMCNC: 30.7 G/DL — LOW (ref 32–36)
MCV RBC AUTO: 98.8 FL — SIGNIFICANT CHANGE UP (ref 80–100)
PLATELET # BLD AUTO: 321 K/UL — SIGNIFICANT CHANGE UP (ref 150–400)
POTASSIUM SERPL-MCNC: 3.7 MMOL/L — SIGNIFICANT CHANGE UP (ref 3.5–5.3)
POTASSIUM SERPL-SCNC: 3.7 MMOL/L — SIGNIFICANT CHANGE UP (ref 3.5–5.3)
PROT SERPL-MCNC: 6.4 G/DL — SIGNIFICANT CHANGE UP (ref 6–8.3)
RBC # BLD: 3.26 M/UL — LOW (ref 4.2–5.8)
RBC # FLD: 19.6 % — HIGH (ref 10.3–16.9)
SODIUM SERPL-SCNC: 138 MMOL/L — SIGNIFICANT CHANGE UP (ref 135–145)
WBC # BLD: 9.3 K/UL — SIGNIFICANT CHANGE UP (ref 3.8–10.5)
WBC # FLD AUTO: 9.3 K/UL — SIGNIFICANT CHANGE UP (ref 3.8–10.5)

## 2018-05-01 PROCEDURE — 99233 SBSQ HOSP IP/OBS HIGH 50: CPT

## 2018-05-01 PROCEDURE — 99232 SBSQ HOSP IP/OBS MODERATE 35: CPT

## 2018-05-01 RX ORDER — RIVAROXABAN 15 MG-20MG
20 KIT ORAL EVERY 24 HOURS
Qty: 0 | Refills: 0 | Status: DISCONTINUED | OUTPATIENT
Start: 2018-05-02 | End: 2018-05-04

## 2018-05-01 RX ORDER — POTASSIUM CHLORIDE 20 MEQ
40 PACKET (EA) ORAL ONCE
Qty: 0 | Refills: 0 | Status: COMPLETED | OUTPATIENT
Start: 2018-05-01 | End: 2018-05-01

## 2018-05-01 RX ORDER — RIVAROXABAN 15 MG-20MG
20 KIT ORAL ONCE
Qty: 0 | Refills: 0 | Status: COMPLETED | OUTPATIENT
Start: 2018-05-01 | End: 2018-05-01

## 2018-05-01 RX ORDER — IPRATROPIUM BROMIDE 0.2 MG/ML
500 SOLUTION, NON-ORAL INHALATION ONCE
Qty: 0 | Refills: 0 | Status: COMPLETED | OUTPATIENT
Start: 2018-05-01 | End: 2018-05-01

## 2018-05-01 RX ADMIN — Medication 40 MILLIGRAM(S): at 17:46

## 2018-05-01 RX ADMIN — Medication 40 MILLIEQUIVALENT(S): at 10:19

## 2018-05-01 RX ADMIN — PANTOPRAZOLE SODIUM 40 MILLIGRAM(S): 20 TABLET, DELAYED RELEASE ORAL at 06:00

## 2018-05-01 RX ADMIN — Medication 1000 UNIT(S): at 12:12

## 2018-05-01 RX ADMIN — RIVAROXABAN 20 MILLIGRAM(S): KIT at 17:51

## 2018-05-01 RX ADMIN — Medication 40 MILLIGRAM(S): at 06:00

## 2018-05-01 RX ADMIN — Medication 5 MILLIGRAM(S): at 22:42

## 2018-05-01 RX ADMIN — Medication 500 MICROGRAM(S): at 14:05

## 2018-05-01 RX ADMIN — PREGABALIN 1000 MICROGRAM(S): 225 CAPSULE ORAL at 12:13

## 2018-05-01 RX ADMIN — AMIODARONE HYDROCHLORIDE 200 MILLIGRAM(S): 400 TABLET ORAL at 06:00

## 2018-05-01 RX ADMIN — PANTOPRAZOLE SODIUM 40 MILLIGRAM(S): 20 TABLET, DELAYED RELEASE ORAL at 17:47

## 2018-05-01 RX ADMIN — Medication 50 MILLIGRAM(S): at 06:00

## 2018-05-01 NOTE — PROGRESS NOTE ADULT - PROBLEM SELECTOR PLAN 4
-EP recs: Flutter rate controlled, need to restore to SR w/ DCCV or ablation once anemia is resolved and started on long term AC as an outpatient,  -GI team has cleared patient for anticoagulation starting tomorrow per discussion with GI fellow this evening.   -Notify EP tomorrow (as results of scopes today done after 5 PM); that patient can start AC. Patient will likely go for DCCV vs ablation this admission (possible Wednesday)  -Currently rate controlled: Will continue Amiodarone 200 mg daily and Toprol Xl 50 mg daily. -EP recs: Flutter rate controlled, need to restore to SR w/ DCCV or ablation.  -GI team has cleared patient for anticoagulation starting 5/1. Xarelto 20 mg PO Daily resumed. EP informed.   -Patient to be observed on AC until Thursday to ensure bleeding does not re-occur. If bleeding does not occur with AC plan for DCCV vs. ablation Thursday.   -Currently rate controlled: Will continue Amiodarone 200 mg daily and Toprol Xl 50 mg daily.

## 2018-05-01 NOTE — PROGRESS NOTE ADULT - SUBJECTIVE AND OBJECTIVE BOX
Pt seen and examined at bedside. LINDA overnight. Denies further episodes of melena. Has no current complaints, denies abdominal pain, nausea, vomiting, hematochezia. Tolerating diet. Has not had a BM, however, passing flatus.     Allergies  No Known Allergies    MEDICATIONS:  MEDICATIONS  (STANDING):  amiodarone    Tablet 200 milliGRAM(s) Oral daily  cholecalciferol 1000 Unit(s) Oral daily  cyanocobalamin 1000 MICROGram(s) Oral daily  furosemide   Injectable 40 milliGRAM(s) IV Push two times a day  ipratropium  for Nebulization. 500 MICROGram(s) Nebulizer once  latanoprost 0.005% Ophthalmic Solution 1 Drop(s) Both EYES at bedtime  metoprolol succinate ER 50 milliGRAM(s) Oral daily  pantoprazole  Injectable 40 milliGRAM(s) IV Push two times a day  timolol 0.5% Solution 1 Drop(s) Both EYES two times a day    MEDICATIONS  (PRN):  zaleplon 5 milliGRAM(s) Oral at bedtime PRN Insomnia    Vital Signs Last 24 Hrs  T(C): 36.6 (01 May 2018 14:00), Max: 36.8 (01 May 2018 10:00)  T(F): 97.8 (01 May 2018 14:00), Max: 98.3 (01 May 2018 10:00)  HR: 70 (01 May 2018 13:15) (56 - 73)  BP: 110/61 (01 May 2018 13:15) (99/56 - 113/55)  BP(mean): --  RR: 18 (01 May 2018 13:15) (16 - 18)  SpO2: 100% (01 May 2018 13:15) (97% - 100%)    04-30 @ 07:01 - 05-01 @ 07:00  --------------------------------------------------------  IN: 420 mL / OUT: 1000 mL / NET: -580 mL    05-01 @ 07:01 - 05-01 @ 14:40  --------------------------------------------------------  IN: 360 mL / OUT: 700 mL / NET: -340 mL    PHYSICAL EXAM:    General: No acute distress  HEENT: MMM, conjunctiva and sclera clear  Gastrointestinal: Soft non-tender non-distended; No rebound or guarding  Skin: Warm and dry. No obvious rash    LABS:                        9.9    9.3   )-----------( 321      ( 01 May 2018 07:32 )             32.2     05-01    138  |  93<L>  |  27<H>  ----------------------------<  108<H>  3.7   |  35<H>  |  1.05    Ca    9.1      01 May 2018 07:32  Mg     2.1     05-01    TPro  6.4  /  Alb  3.5  /  TBili  0.8  /  DBili  0.2  /  AST  35  /  ALT  63<H>  /  AlkPhos  130<H>  05-01    RADIOLOGY & ADDITIONAL STUDIES:  US Abdomen Limited (04.30.18 @ 21:22)  FINDINGS: Ultrasound evaluation of the right upper quadrant demonstrates   no focal hepatic abnormalities. The liver is not enlarged.  No dilated   intrahepatic bile ducts are seen.   The common bile duct is normal in   diameter measuring 0.3-0.4   cm.  The gallbladder is somewhat contracted   and contains a 2 cm calculus. No GB wall thickening. No pericholecystic   fluid.  The visualized portions of the pancreas are unremarkable.   The   visualized portions of the abdominal aorta and inferior vena cava are   normal in appearance.   The right kidney is 10.5 cm in length and normal   in appearance.  No ascites. The main portal vein is patent on color flow   Doppler and shows hepatopetal flow. The hepatic veins are patent.      IMPRESSION:  Cholelithiasis. No biliary dilatation.

## 2018-05-01 NOTE — PROGRESS NOTE ADULT - PROBLEM SELECTOR PLAN 8
-Lives with his wife. reports he is in cardiac rehab.   -PT evaluation ordered    Dispo: F/U RUQ US. Discuss DCCV vs ablation with EP tomorrow (cleared for AC starting tomorrow per GI team).     Case discussed with Dr. Bonilla this AM. -Lives with his wife. reports he is in cardiac rehab.   -PT evaluation ordered    Dispo: Monitor CBC on Anti-coagulation. Plan for BRIAN/DCCV vs. ablation on Thursday if no recurrence of bleed on AC.     Case discussed with Dr. Bonilla this AM.

## 2018-05-01 NOTE — DIETITIAN INITIAL EVALUATION ADULT. - OTHER INFO
76 y/o M, referred to [facility] ED (4/27/2018) and was found to be in acute diastolic CHF exacerbation with rate controlled Atrial Flutter in the setting of acute anemia. Patient is now s/p EGD and C-Scope today revealing clean base ulcer in duodenal bulb (likely cause of bleed). Patient will be cleared to start anticoagulation tomorrow per GI with plan for DCCV vs ablation Wednesday. Pt reports > 75% po intake, denies N/V/D/C; w/NKFA; MOSTLY FOLLOWS HEALTHY DIET ET HOME, was not aware of rec. for reducing Na intake; DASH/TLC diet edu reviewed; pt denies pain; skin w/ surgical incision from 3/27 (midline chest), w/ 2+ L/R leg edema.

## 2018-05-01 NOTE — PROGRESS NOTE ADULT - PROBLEM SELECTOR PLAN 2
-Elevated LFTs (on statin therapy/in addition may be hepatic congestion from CHF): Limited RUQ ultrasound 4/30: Cholelithiasis. No biliary dilatation.   -In addition will hold patient’s Lipitor 80 mg daily x 48 hours starting today to see if LFTs improve  -Will continue to trend daily. -Elevated LFTs (on statin therapy/in addition may be hepatic congestion from CHF): Limited RUQ ultrasound 4/30: Cholelithiasis. No biliary dilatation.   -In addition will hold patient’s Lipitor 80 mg daily x 48 hours (starting 4/30) to see if LFTs improve  -Will continue to trend daily. AST normalized. ALT elevated at 63.

## 2018-05-01 NOTE — PROGRESS NOTE ADULT - PROBLEM SELECTOR PLAN 3
-BNP 2,579-- REPEAT BNP (4/29/2018): 1,516. Daily weights, strict I/Os. On exam: leg edema slightly improved since admission. Lungs with faint bibasilar crackles.   -Patient was complaining of SOB yesterday which has resolved today; likely related to CHF and acute anemia. STAT CXR preliminary read (4/29/2018): no overt congestion. F/U official read  -CT chest (4/27/2018): No effusion.   -Echo (4/27/2018): mild AV thickening, mild LV thickening, mild MAC, trace MR, trace TR, LVEF 55-60%. No pericardial effusion.   -Continue Lasix 40 mg IV BID.  (if patient needs further transfusions may need additional Lasix)  -Leg Edema (3+ pitting and LE cool to touch): Lower extremity venous Duplex 04/27 no DVT seen.  -Hypokalemia: k 3.3.4 this AM supplemented with Kdur 40 mg PO x one dose. Magnesium WNL -BNP 2,579-- REPEAT BNP (4/29/2018): 1,516. Daily weights, strict I/Os. On exam: leg edema slightly improved since admission. Lungs CTA   -Patient was complaining of SOB 4/29 which has resolved today; likely related to CHF and acute anemia. STAT CXR preliminary read (4/29/2018): negative for consolidation.   -CT chest (4/27/2018): No effusion.   -Echo (4/27/2018): mild AV thickening, mild LV thickening, mild MAC, trace MR, trace TR, LVEF 55-60%. No pericardial effusion.   -Continue Lasix 40 mg IV BID.  (if patient needs further transfusions may need additional Lasix)  -Leg Edema (3+ pitting initially and LE cool to touch ): Lower extremity venous Duplex 04/27 no DVT seen.  -Hypokalemia: K+3.7 this AM supplemented with Kdur 40 mg PO x one dose. Magnesium WNL

## 2018-05-01 NOTE — PROGRESS NOTE ADULT - SUBJECTIVE AND OBJECTIVE BOX
Interventional Cardiology PA Adult Progress Note    CC: SOB, LE edema, Rapid Aflutter, Anemia     Subjective Assessment: Patient seen and examined at bedside. Patient continues to report SOB but states improved since admission however persists. Shortness of breath unchanged with oxygen and nebulizer.     12 point Review of Systems otherwise negative except per Subjective   	  MEDICATIONS:  amiodarone    Tablet 200 milliGRAM(s) Oral daily  furosemide   Injectable 40 milliGRAM(s) IV Push two times a day  metoprolol succinate ER 50 milliGRAM(s) Oral daily  ipratropium  for Nebulization. 500 MICROGram(s) Nebulizer once    zaleplon 5 milliGRAM(s) Oral at bedtime PRN    pantoprazole  Injectable 40 milliGRAM(s) IV Push two times a day      cholecalciferol 1000 Unit(s) Oral daily  cyanocobalamin 1000 MICROGram(s) Oral daily  latanoprost 0.005% Ophthalmic Solution 1 Drop(s) Both EYES at bedtime  rivaroxaban 20 milliGRAM(s) Oral once  timolol 0.5% Solution 1 Drop(s) Both EYES two times a day      	    [PHYSICAL EXAM:  TELEMETRY:  T(C): 36.6 (05-01-18 @ 14:00), Max: 36.8 (05-01-18 @ 10:00)  HR: 71 (05-01-18 @ 17:26) (56 - 73)  BP: 126/69 (05-01-18 @ 17:26) (99/56 - 126/69)  RR: 18 (05-01-18 @ 17:26) (16 - 18)  SpO2: 100% (05-01-18 @ 13:15) (97% - 100%)  Wt(kg): --  I&O's Summary    30 Apr 2018 07:01  -  01 May 2018 07:00  --------------------------------------------------------  IN: 420 mL / OUT: 1000 mL / NET: -580 mL    01 May 2018 07:01  -  01 May 2018 17:35  --------------------------------------------------------  IN: 360 mL / OUT: 700 mL / NET: -340 mL        Jordan:  Central/PICC/Mid Line:                                         Appearance: Normal	  HEENT:   Normal oral mucosa, PERRL, EOMI	  Neck: Supple. No JVD.   Cardiovascular: + S1 S2. RRR. No murmurs, rubs or gallops  Respiratory: CTA Bilaterally. No rhonchi, wheezes, rales.   Gastrointestinal:  Soft, Non-tender, + BS	x 4.   Extremities: Normal range of motion, No clubbing, cyanosis or calf tenderness BLE. 1-2+pitting edema BLE L>R,   Neurologic: Non-focal  Psychiatry: A & O x 3. Anxious.     	    ECG:  	  RADIOLOGY:   DIAGNOSTIC TESTING:  [ ] Echocardiogram:  [ ]  Catheterization:  [ ] Stress Test:    [ ] BRIAN  OTHER: 	    LABS:	 	  CARDIAC MARKERS:                                  9.9    9.3   )-----------( 321      ( 01 May 2018 07:32 )             32.2     05-01    138  |  93<L>  |  27<H>  ----------------------------<  108<H>  3.7   |  35<H>  |  1.05    Ca    9.1      01 May 2018 07:32  Mg     2.1     05-01    TPro  6.4  /  Alb  3.5  /  TBili  0.8  /  DBili  0.2  /  AST  35  /  ALT  63<H>  /  AlkPhos  130<H>  05-01    proBNP:   Lipid Profile:   HgA1c:   TSH:   PT/INR - ( 30 Apr 2018 07:24 )   PT: 14.0 sec;   INR: 1.26          PTT - ( 30 Apr 2018 07:24 )  PTT:31.2 sec Interventional Cardiology PA Adult Progress Note    CC: SOB, LE edema, A flutter,  Anemia     Subjective Assessment: Patient seen and examined at bedside. Patient continues to report SOB but states improved since admission however persists. Shortness of breath unchanged with oxygen and nebulizer.     12 point Review of Systems otherwise negative except per Subjective   	  MEDICATIONS:  amiodarone    Tablet 200 milliGRAM(s) Oral daily  furosemide   Injectable 40 milliGRAM(s) IV Push two times a day  metoprolol succinate ER 50 milliGRAM(s) Oral daily  ipratropium  for Nebulization. 500 MICROGram(s) Nebulizer once    zaleplon 5 milliGRAM(s) Oral at bedtime PRN    pantoprazole  Injectable 40 milliGRAM(s) IV Push two times a day      cholecalciferol 1000 Unit(s) Oral daily  cyanocobalamin 1000 MICROGram(s) Oral daily  latanoprost 0.005% Ophthalmic Solution 1 Drop(s) Both EYES at bedtime  rivaroxaban 20 milliGRAM(s) Oral once  timolol 0.5% Solution 1 Drop(s) Both EYES two times a day      	    [PHYSICAL EXAM:  TELEMETRY:  T(C): 36.6 (05-01-18 @ 14:00), Max: 36.8 (05-01-18 @ 10:00)  HR: 71 (05-01-18 @ 17:26) (56 - 73)  BP: 126/69 (05-01-18 @ 17:26) (99/56 - 126/69)  RR: 18 (05-01-18 @ 17:26) (16 - 18)  SpO2: 100% (05-01-18 @ 13:15) (97% - 100%)  Wt(kg): --  I&O's Summary    30 Apr 2018 07:01  -  01 May 2018 07:00  --------------------------------------------------------  IN: 420 mL / OUT: 1000 mL / NET: -580 mL    01 May 2018 07:01  -  01 May 2018 17:35  --------------------------------------------------------  IN: 360 mL / OUT: 700 mL / NET: -340 mL        Jordan:  Central/PICC/Mid Line:                                         Appearance: Normal	  HEENT:   Normal oral mucosa, PERRL, EOMI	  Neck: Supple. No JVD.   Cardiovascular: + S1 S2. RRR. No murmurs, rubs or gallops  Respiratory: CTA Bilaterally. No rhonchi, wheezes, rales.   Gastrointestinal:  Soft, Non-tender, + BS	x 4.   Extremities: Normal range of motion, No clubbing, cyanosis or calf tenderness BLE. 1-2+pitting edema BLE L>R,   Neurologic: Non-focal  Psychiatry: A & O x 3. Anxious.     	    ECG:  	  RADIOLOGY:   DIAGNOSTIC TESTING:  [ ] Echocardiogram:  [ ]  Catheterization:  [ ] Stress Test:    [ ] BRIAN  OTHER: 	    LABS:	 	  CARDIAC MARKERS:                                  9.9    9.3   )-----------( 321      ( 01 May 2018 07:32 )             32.2     05-01    138  |  93<L>  |  27<H>  ----------------------------<  108<H>  3.7   |  35<H>  |  1.05    Ca    9.1      01 May 2018 07:32  Mg     2.1     05-01    TPro  6.4  /  Alb  3.5  /  TBili  0.8  /  DBili  0.2  /  AST  35  /  ALT  63<H>  /  AlkPhos  130<H>  05-01    proBNP:   Lipid Profile:   HgA1c:   TSH:   PT/INR - ( 30 Apr 2018 07:24 )   PT: 14.0 sec;   INR: 1.26          PTT - ( 30 Apr 2018 07:24 )  PTT:31.2 sec

## 2018-05-01 NOTE — PROGRESS NOTE ADULT - PROBLEM SELECTOR PLAN 7
-WBC: 14.8 on admission shift. Leukocytosis resolved.   -Remains afebrile.   -CXR and CT Chest with no infiltrate.   -UCX negative.

## 2018-05-01 NOTE — PROGRESS NOTE ADULT - SUBJECTIVE AND OBJECTIVE BOX
EPS Progress Note    S: OOB, feels better     MEDICATIONS  (STANDING):  amiodarone    Tablet 200 milliGRAM(s) Oral daily  cholecalciferol 1000 Unit(s) Oral daily  cyanocobalamin 1000 MICROGram(s) Oral daily  furosemide   Injectable 40 milliGRAM(s) IV Push two times a day  latanoprost 0.005% Ophthalmic Solution 1 Drop(s) Both EYES at bedtime  metoprolol succinate ER 50 milliGRAM(s) Oral daily  pantoprazole  Injectable 40 milliGRAM(s) IV Push two times a day  timolol 0.5% Solution 1 Drop(s) Both EYES two times a day      Telemetry: atrial flutter           General:  NAD         Chest:  CTA B/L     Cardiac:  s1/s2       Irregular       Abdomen:  +BS      Soft      Non-Tender      Non-Distended       Extremities:  + edema         Labs:                                                               9.9    9.3   )-----------( 321      ( 01 May 2018 07:32 )             32.2     05-01    138  |  93<L>  |  27<H>  ----------------------------<  108<H>  3.7   |  35<H>  |  1.05    Ca    9.1      01 May 2018 07:32  Mg     2.1     05-01    TPro  6.4  /  Alb  3.5  /  TBili  0.8  /  DBili  0.2  /  AST  35  /  ALT  63<H>  /  AlkPhos  130<H>  05-01    PT/INR - ( 30 Apr 2018 07:24 )   PT: 14.0 sec;   INR: 1.26          PTT - ( 30 Apr 2018 07:24 )  PTT:31.2 sec    Assessment/Plan:  76 yo M with history of CAD, myasthenia gravis, history of atrial fibrillation on Xarelto and Amiodarone, s/p CABG at [facility] 3/27/18 ( LIMA> LAD, ALY>RI, SVG>PDA), resent admission on 4/10/18 for SOB,  on 4/26/18 he  was sent to ED form his PMD office with newly diagnosed atrial flutter.   GI work up done,  s/p EGD and C-Scope today revealing clean base ulcer in duodenal bulb (likely cause of bleed). Once patient is restarted on anticoagulation will plan for BRIAN/DCCV.

## 2018-05-01 NOTE — DIETITIAN INITIAL EVALUATION ADULT. - ENERGY NEEDS
Ht: 182.88cm, wt (4/26) 71.4kg, IBW:  80.9kg %IBW:  88% BMI:  21.4; ABW utilized for nutritional needs as it is within % of IBW; needs per age and pre/post op status; fluid per MD d/t CHF

## 2018-05-01 NOTE — PROGRESS NOTE ADULT - PROBLEM SELECTOR PLAN 1
-Hgb/HCT 6.8/22.7 on admission. Macro/normocytic anemia consistent with acute bleed   -Guaiac positive in ED, Stool occult positive as well.   -Total of 3 Units of PRBC thus far: s/p 2 Units of PRBC 4/26/2018 and 1 Unit of PRBC 4/29/2018. Current Hgb/HCT stable: 9.9/32.2 this AM. Goal Hemoglobin >8.0.   -s/p EGD and Colonoscopy (4/30/2018): EGD: Clean base ulcer in duodenal bulb (likely culprit of bleed per GI; no intervention performed though as currently healing). C-Scope: external Hemorrhoids. H.Pylori sent. To continue IV Protonix 40 mg BID until discharge per GI with plan to transition to PO for 6-8 weeks. Patient cleared to start anticoagulation 5/1-Xarelto 20 mg PO Daily.   -CT Chest w/o to r/o post op bleed (CABG 3/2018): No acute thoracic pathology.

## 2018-05-01 NOTE — PROGRESS NOTE ADULT - ASSESSMENT
76 y/o man with hx lung Ca s/p RML resection (several years ago, followed at [facility], in remission as per patient),  s/p recent CABG at [facility] 3/27/18 (LIMA-LAD, ALY-RI, SVG-PDA), diagnosed with Atrial Flutter (4/10/2018) for which Amiodarone and Xarelto were initiated was referred to [facility] ED (4/27/2018) and was found to be in acute diastolic CHF exacerbation with rate controlled Atrial Flutter in the setting of acute anemia. Patient is now s/p EGD and C-Scope today revealing clean base ulcer in duodenal bulb (likely cause of bleed). Patient will be cleared to start anticoagulation tomorrow per GI with plan for DCCV vs ablation Thursday.

## 2018-05-01 NOTE — PROGRESS NOTE ADULT - PROBLEM SELECTOR PLAN 5
-s/p 3VCABG: 3/27/18 (LIMA-LAD, ALY-RI, SVG-PDA) at [place]  -CP free this AM. Troponin 0.02--0.03. No further troponin trend per Dr. Bonilla.   -Holding ASA in the setting of acute anemia.   -Continue Toprol XL 50 mg daily. Will hold Lipitor 80 mg daily for 48 hours in the setting of elevated LFTs.   - LDL: 33 and Hemoglobin A1c 4.7.

## 2018-05-02 LAB
ALBUMIN SERPL ELPH-MCNC: 3.6 G/DL — SIGNIFICANT CHANGE UP (ref 3.3–5)
ALP SERPL-CCNC: 124 U/L — HIGH (ref 40–120)
ALT FLD-CCNC: 54 U/L — HIGH (ref 10–45)
ANION GAP SERPL CALC-SCNC: 9 MMOL/L — SIGNIFICANT CHANGE UP (ref 5–17)
AST SERPL-CCNC: 28 U/L — SIGNIFICANT CHANGE UP (ref 10–40)
BASOPHILS NFR BLD AUTO: 0.1 % — SIGNIFICANT CHANGE UP (ref 0–2)
BILIRUB SERPL-MCNC: 0.8 MG/DL — SIGNIFICANT CHANGE UP (ref 0.2–1.2)
BUN SERPL-MCNC: 21 MG/DL — SIGNIFICANT CHANGE UP (ref 7–23)
CALCIUM SERPL-MCNC: 9.2 MG/DL — SIGNIFICANT CHANGE UP (ref 8.4–10.5)
CHLORIDE SERPL-SCNC: 93 MMOL/L — LOW (ref 96–108)
CO2 SERPL-SCNC: 36 MMOL/L — HIGH (ref 22–31)
CREAT SERPL-MCNC: 0.98 MG/DL — SIGNIFICANT CHANGE UP (ref 0.5–1.3)
EOSINOPHIL NFR BLD AUTO: 0.8 % — SIGNIFICANT CHANGE UP (ref 0–6)
GLUCOSE SERPL-MCNC: 103 MG/DL — HIGH (ref 70–99)
H PYLORI AB SER-ACNC: 6 UNITS — SIGNIFICANT CHANGE UP
HCT VFR BLD CALC: 30.9 % — LOW (ref 39–50)
HGB BLD-MCNC: 9.6 G/DL — LOW (ref 13–17)
LYMPHOCYTES # BLD AUTO: 15.9 % — SIGNIFICANT CHANGE UP (ref 13–44)
MAGNESIUM SERPL-MCNC: 2.2 MG/DL — SIGNIFICANT CHANGE UP (ref 1.6–2.6)
MCHC RBC-ENTMCNC: 30.2 PG — SIGNIFICANT CHANGE UP (ref 27–34)
MCHC RBC-ENTMCNC: 31.1 G/DL — LOW (ref 32–36)
MCV RBC AUTO: 97.2 FL — SIGNIFICANT CHANGE UP (ref 80–100)
MONOCYTES NFR BLD AUTO: 8.8 % — SIGNIFICANT CHANGE UP (ref 2–14)
NEUTROPHILS NFR BLD AUTO: 74.4 % — SIGNIFICANT CHANGE UP (ref 43–77)
PLATELET # BLD AUTO: 320 K/UL — SIGNIFICANT CHANGE UP (ref 150–400)
POTASSIUM SERPL-MCNC: 3.3 MMOL/L — LOW (ref 3.5–5.3)
POTASSIUM SERPL-SCNC: 3.3 MMOL/L — LOW (ref 3.5–5.3)
PROT SERPL-MCNC: 6.3 G/DL — SIGNIFICANT CHANGE UP (ref 6–8.3)
RBC # BLD: 3.18 M/UL — LOW (ref 4.2–5.8)
RBC # FLD: 19 % — HIGH (ref 10.3–16.9)
SODIUM SERPL-SCNC: 138 MMOL/L — SIGNIFICANT CHANGE UP (ref 135–145)
SURGICAL PATHOLOGY STUDY: SIGNIFICANT CHANGE UP
SURGICAL PATHOLOGY STUDY: SIGNIFICANT CHANGE UP
WBC # BLD: 8.7 K/UL — SIGNIFICANT CHANGE UP (ref 3.8–10.5)
WBC # FLD AUTO: 8.7 K/UL — SIGNIFICANT CHANGE UP (ref 3.8–10.5)

## 2018-05-02 PROCEDURE — 99232 SBSQ HOSP IP/OBS MODERATE 35: CPT

## 2018-05-02 PROCEDURE — 99233 SBSQ HOSP IP/OBS HIGH 50: CPT

## 2018-05-02 RX ORDER — DOCUSATE SODIUM 100 MG
100 CAPSULE ORAL THREE TIMES A DAY
Qty: 0 | Refills: 0 | Status: DISCONTINUED | OUTPATIENT
Start: 2018-05-02 | End: 2018-05-04

## 2018-05-02 RX ORDER — POTASSIUM CHLORIDE 20 MEQ
40 PACKET (EA) ORAL EVERY 4 HOURS
Qty: 0 | Refills: 0 | Status: COMPLETED | OUTPATIENT
Start: 2018-05-02 | End: 2018-05-02

## 2018-05-02 RX ORDER — SENNA PLUS 8.6 MG/1
2 TABLET ORAL AT BEDTIME
Qty: 0 | Refills: 0 | Status: DISCONTINUED | OUTPATIENT
Start: 2018-05-02 | End: 2018-05-04

## 2018-05-02 RX ORDER — ALPRAZOLAM 0.25 MG
0.25 TABLET ORAL ONCE
Qty: 0 | Refills: 0 | Status: DISCONTINUED | OUTPATIENT
Start: 2018-05-02 | End: 2018-05-02

## 2018-05-02 RX ADMIN — Medication 40 MILLIGRAM(S): at 05:50

## 2018-05-02 RX ADMIN — Medication 1000 UNIT(S): at 10:29

## 2018-05-02 RX ADMIN — Medication 40 MILLIGRAM(S): at 18:00

## 2018-05-02 RX ADMIN — Medication 100 MILLIGRAM(S): at 21:25

## 2018-05-02 RX ADMIN — AMIODARONE HYDROCHLORIDE 200 MILLIGRAM(S): 400 TABLET ORAL at 05:50

## 2018-05-02 RX ADMIN — PANTOPRAZOLE SODIUM 40 MILLIGRAM(S): 20 TABLET, DELAYED RELEASE ORAL at 18:00

## 2018-05-02 RX ADMIN — Medication 0.25 MILLIGRAM(S): at 12:34

## 2018-05-02 RX ADMIN — Medication 50 MILLIGRAM(S): at 05:50

## 2018-05-02 RX ADMIN — Medication 40 MILLIEQUIVALENT(S): at 10:29

## 2018-05-02 RX ADMIN — Medication 40 MILLIEQUIVALENT(S): at 12:35

## 2018-05-02 RX ADMIN — PANTOPRAZOLE SODIUM 40 MILLIGRAM(S): 20 TABLET, DELAYED RELEASE ORAL at 05:50

## 2018-05-02 RX ADMIN — Medication 100 MILLIGRAM(S): at 12:35

## 2018-05-02 RX ADMIN — Medication 5 MILLIGRAM(S): at 22:48

## 2018-05-02 RX ADMIN — PREGABALIN 1000 MICROGRAM(S): 225 CAPSULE ORAL at 10:30

## 2018-05-02 RX ADMIN — RIVAROXABAN 20 MILLIGRAM(S): KIT at 17:16

## 2018-05-02 RX ADMIN — SENNA PLUS 2 TABLET(S): 8.6 TABLET ORAL at 21:25

## 2018-05-02 NOTE — PROGRESS NOTE ADULT - PROBLEM SELECTOR PLAN 3
-BNP 2,579-- REPEAT BNP (4/29/2018): 1,516. Daily weights, strict I/Os. On exam: leg edema slightly improved since admission. Lungs CTA   -Patient was complaining of SOB 4/29 which has resolved  likely related to CHF and acute anemia. STAT CXR preliminary read (4/29/2018): negative for consolidation.   -CT chest (4/27/2018): No effusion.   -Echo (4/27/2018): mild AV thickening, mild LV thickening, mild MAC, trace MR, trace TR, LVEF 55-60%. No pericardial effusion.   -Continue Lasix 40 mg IV BID.  (if patient needs further transfusions may need additional Lasix)  -Leg Edema (3+ pitting initially and LE cool to touch ): Lower extremity venous Duplex 04/27 no DVT seen.  -Hypokalemia: K+3.3 this AM supplemented with Kdur 40 mg PO x two doses Magnesium WNL

## 2018-05-02 NOTE — PROGRESS NOTE ADULT - PROBLEM SELECTOR PLAN 8
-Lives with his wife. reports he is in cardiac rehab.   -PT evaluated and recommends home with home PT.     Dispo: Monitor CBC on Anti-coagulation. Plan for BRIAN followed by DCCV vs. ablation tomorrow 5/3/18 if no recurrence of bleed on AC.     Case discussed with Dr. Bonilla this AM.

## 2018-05-02 NOTE — PROGRESS NOTE ADULT - SUBJECTIVE AND OBJECTIVE BOX
EPS Progress Note    S: OOB, no complains     MEDICATIONS  (STANDING):  amiodarone    Tablet 200 milliGRAM(s) Oral daily  cholecalciferol 1000 Unit(s) Oral daily  cyanocobalamin 1000 MICROGram(s) Oral daily  furosemide   Injectable 40 milliGRAM(s) IV Push two times a day  latanoprost 0.005% Ophthalmic Solution 1 Drop(s) Both EYES at bedtime  metoprolol succinate ER 50 milliGRAM(s) Oral daily  pantoprazole  Injectable 40 milliGRAM(s) IV Push two times a day  potassium chloride    Tablet ER 40 milliEquivalent(s) Oral every 4 hours  rivaroxaban 20 milliGRAM(s) Oral every 24 hours  timolol 0.5% Solution 1 Drop(s) Both EYES two times a day      Telemetry: atrial flutter           General:  NAD         Chest:  CTA B/L    Cardiac:  s1/s2       Irregular     Abdomen:  +BS      Soft      Non-Tender         Extremities:  + edema     Labs:                                                               9.6    8.7   )-----------( 320      ( 02 May 2018 07:23 )             30.9     05-02    138  |  93<L>  |  21  ----------------------------<  103<H>  3.3<L>   |  36<H>  |  0.98    Ca    9.2      02 May 2018 07:23  Mg     2.2     05-02    TPro  6.3  /  Alb  3.6  /  TBili  0.8  /  DBili  x   /  AST  28  /  ALT  54<H>  /  AlkPhos  124<H>  05-02        Assessment/Plan:  78 yo M with history of CAD, myasthenia gravis, history of atrial fibrillation on Xarelto and Amiodarone, s/p CABG at [facility] 3/27/18 ( LIMA> LAD, ALY>RI, SVG>PDA), resent admission on 4/10/18 for SOB,  on 4/26/18 he  was sent to ED form his PMD office with newly diagnosed atrial flutter.   GI work up done,  s/p EGD and C-Scope today revealing clean base ulcer in duodenal bulb (likely cause of bleed). Patient is back  on Xarelto, monitor CBC  and any signs of  bleeding, if H/H is stable will plan for BRIAN and  atrial flutter ablation in am.

## 2018-05-02 NOTE — PROGRESS NOTE ADULT - PROBLEM SELECTOR PLAN 4
-EP recs: Flutter rate controlled, need to restore to SR w/ DCCV or ablation.  -GI team has cleared patient for anticoagulation starting 5/1. Xarelto 20 mg PO Daily resumed. EP informed.   -Patient to be observed on AC until Thursday to ensure bleeding does not re-occur. CBC remains stable. NPO after midnight for BRIAN followed by DCCV vs. ablation.   -Currently rate controlled: Will continue Amiodarone 200 mg daily and Toprol Xl 50 mg daily.

## 2018-05-02 NOTE — PROGRESS NOTE ADULT - ASSESSMENT
78 y/o man with hx lung Ca s/p RML resection (several years ago, followed at [facility], in remission as per patient),  s/p recent CABG at [facility] 3/27/18 (LIMA-LAD, ALY-RI, SVG-PDA), diagnosed with Atrial Flutter (4/10/2018) for which Amiodarone and Xarelto were initiated was referred to [facility] ED (4/27/2018) and was found to be in acute diastolic CHF exacerbation with rate controlled Atrial Flutter in the setting of acute anemia. Patient is now s/p EGD and C-Scope today revealing clean base ulcer in duodenal bulb (likely cause of bleed). Patient resumed Anti-coagulation 5/1 per GI with plan for DCCV vs ablation Thursday.

## 2018-05-02 NOTE — PROGRESS NOTE ADULT - PROBLEM SELECTOR PLAN 5
-s/p 3VCABG: 3/27/18 (LIMA-LAD, ALY-RI, SVG-PDA) at [place]  -CP free this AM. Troponin 0.02--0.03. No further troponin trend per Dr. Bonilla.   -Holding ASA in the setting of acute anemia.   -Continue Toprol XL 50 mg daily. Will hold Lipitor 80 mg daily for 48 hours in the setting of elevated LFTs.   - LDL: 33 and Hemoglobin A1c 4.7.

## 2018-05-02 NOTE — PROGRESS NOTE ADULT - SUBJECTIVE AND OBJECTIVE BOX
Interventional Cardiology PA Adult Progress Note    CC: SOB, LE edema, Rapid Aflutter, Anemia     Subjective Assessment: Patient seen and examined at bedside. Patient reports SOB is much improved today. Patient states Xanax helped relieve his anxiety. Patient denies C/P, palpitations, dizziness, diaphoresis, N/V, dysuria, hematuria.     12 point Review of Systems otherwise negative except per Subjective   	  MEDICATIONS:  amiodarone    Tablet 200 milliGRAM(s) Oral daily  furosemide   Injectable 40 milliGRAM(s) IV Push two times a day  metoprolol succinate ER 50 milliGRAM(s) Oral daily  zaleplon 5 milliGRAM(s) Oral at bedtime PRN  docusate sodium 100 milliGRAM(s) Oral three times a day  pantoprazole  Injectable 40 milliGRAM(s) IV Push two times a day  senna 2 Tablet(s) Oral at bedtime  cholecalciferol 1000 Unit(s) Oral daily  cyanocobalamin 1000 MICROGram(s) Oral daily  latanoprost 0.005% Ophthalmic Solution 1 Drop(s) Both EYES at bedtime  rivaroxaban 20 milliGRAM(s) Oral every 24 hours  timolol 0.5% Solution 1 Drop(s) Both EYES two times a day      	    [PHYSICAL EXAM:  TELEMETRY:  T(C): 36.4 (05-02-18 @ 13:28), Max: 36.9 (05-02-18 @ 00:09)  HR: 55 (05-02-18 @ 17:07) (55 - 76)  BP: 108/60 (05-02-18 @ 17:07) (99/57 - 118/69)  RR: 16 (05-02-18 @ 17:07) (16 - 18)  SpO2: 100% (05-02-18 @ 17:07) (100% - 100%)  Wt(kg): --  I&O's Summary    01 May 2018 07:01  -  02 May 2018 07:00  --------------------------------------------------------  IN: 360 mL / OUT: 950 mL / NET: -590 mL    02 May 2018 07:01  -  02 May 2018 18:42  --------------------------------------------------------  IN: 600 mL / OUT: 0 mL / NET: 600 mL        Jordan:  Central/PICC/Mid Line:                                         Appearance: Normal	  HEENT:   Normal oral mucosa, PERRL, EOMI	  Neck: Supple. No JVD.   Cardiovascular: + S1 S2. RRR. No murmurs, rubs or gallops  Respiratory: CTA Bilaterally. No rhonchi, wheezes, rales.   Gastrointestinal:  Soft, Non-tender, + BS	x 4.   Extremities: Normal range of motion, No clubbing, cyanosis or calf tenderness BLE. 2+pitting edema BLE    Neurologic: Non-focal  Psychiatry: A & O x 3.       	    ECG:  	  RADIOLOGY:   DIAGNOSTIC TESTING:  [ ] Echocardiogram:  [ ]  Catheterization:  [ ] Stress Test:    [ ] BRIAN  OTHER: 	    LABS:	 	  CARDIAC MARKERS:                                  9.6    8.7   )-----------( 320      ( 02 May 2018 07:23 )             30.9     05-02    138  |  93<L>  |  21  ----------------------------<  103<H>  3.3<L>   |  36<H>  |  0.98    Ca    9.2      02 May 2018 07:23  Mg     2.2     05-02    TPro  6.3  /  Alb  3.6  /  TBili  0.8  /  DBili  x   /  AST  28  /  ALT  54<H>  /  AlkPhos  124<H>  05-02    proBNP:   Lipid Profile:   HgA1c:   TSH: Interventional Cardiology PA Adult Progress Note    CC: SOB, LE edema, Aflutter, Anemia     Subjective Assessment: Patient seen and examined at bedside. Patient reports SOB is much improved today. Patient states Xanax helped relieve his anxiety. Patient denies C/P, palpitations, dizziness, diaphoresis, N/V, dysuria, hematuria.     12 point Review of Systems otherwise negative except per Subjective   	  MEDICATIONS:  amiodarone    Tablet 200 milliGRAM(s) Oral daily  furosemide   Injectable 40 milliGRAM(s) IV Push two times a day  metoprolol succinate ER 50 milliGRAM(s) Oral daily  zaleplon 5 milliGRAM(s) Oral at bedtime PRN  docusate sodium 100 milliGRAM(s) Oral three times a day  pantoprazole  Injectable 40 milliGRAM(s) IV Push two times a day  senna 2 Tablet(s) Oral at bedtime  cholecalciferol 1000 Unit(s) Oral daily  cyanocobalamin 1000 MICROGram(s) Oral daily  latanoprost 0.005% Ophthalmic Solution 1 Drop(s) Both EYES at bedtime  rivaroxaban 20 milliGRAM(s) Oral every 24 hours  timolol 0.5% Solution 1 Drop(s) Both EYES two times a day      	    [PHYSICAL EXAM:  TELEMETRY:  T(C): 36.4 (05-02-18 @ 13:28), Max: 36.9 (05-02-18 @ 00:09)  HR: 55 (05-02-18 @ 17:07) (55 - 76)  BP: 108/60 (05-02-18 @ 17:07) (99/57 - 118/69)  RR: 16 (05-02-18 @ 17:07) (16 - 18)  SpO2: 100% (05-02-18 @ 17:07) (100% - 100%)  Wt(kg): --  I&O's Summary    01 May 2018 07:01  -  02 May 2018 07:00  --------------------------------------------------------  IN: 360 mL / OUT: 950 mL / NET: -590 mL    02 May 2018 07:01  -  02 May 2018 18:42  --------------------------------------------------------  IN: 600 mL / OUT: 0 mL / NET: 600 mL        Jordan:  Central/PICC/Mid Line:                                         Appearance: Normal	  HEENT:   Normal oral mucosa, PERRL, EOMI	  Neck: Supple. No JVD.   Cardiovascular: + S1 S2. RRR. No murmurs, rubs or gallops  Respiratory: CTA Bilaterally. No rhonchi, wheezes, rales.   Gastrointestinal:  Soft, Non-tender, + BS	x 4.   Extremities: Normal range of motion, No clubbing, cyanosis or calf tenderness BLE. 2+pitting edema BLE    Neurologic: Non-focal  Psychiatry: A & O x 3.       	    ECG:  	  RADIOLOGY:   DIAGNOSTIC TESTING:  [ ] Echocardiogram:  [ ]  Catheterization:  [ ] Stress Test:    [ ] BRIAN  OTHER: 	    LABS:	 	  CARDIAC MARKERS:                                  9.6    8.7   )-----------( 320      ( 02 May 2018 07:23 )             30.9     05-02    138  |  93<L>  |  21  ----------------------------<  103<H>  3.3<L>   |  36<H>  |  0.98    Ca    9.2      02 May 2018 07:23  Mg     2.2     05-02    TPro  6.3  /  Alb  3.6  /  TBili  0.8  /  DBili  x   /  AST  28  /  ALT  54<H>  /  AlkPhos  124<H>  05-02    proBNP:   Lipid Profile:   HgA1c:   TSH:

## 2018-05-02 NOTE — PROGRESS NOTE ADULT - PROBLEM SELECTOR PLAN 2
-Elevated LFTs (on statin therapy/in addition may be hepatic congestion from CHF): Limited RUQ ultrasound 4/30: Cholelithiasis. No biliary dilatation.   -Lipitor 80 mg daily held (starting 4/30) to see if LFTs improve.  -Will continue to trend daily. AST normalized. ALT elevated at 54 but continues to improve.

## 2018-05-02 NOTE — PROGRESS NOTE ADULT - PROBLEM SELECTOR PLAN 1
-Hgb/HCT 6.8/22.7 on admission. Macro/normocytic anemia consistent with acute bleed   -Guaiac positive in ED, Stool occult positive as well.   -Total of 3 Units of PRBC thus far: s/p 2 Units of PRBC 4/26/2018 and 1 Unit of PRBC 4/29/2018. Current Hgb/HCT stable: 9.6/30.9 this AM. Goal Hemoglobin >8.0.   -s/p EGD and Colonoscopy (4/30/2018): EGD: Clean base ulcer in duodenal bulb (likely culprit of bleed per GI; no intervention performed though as currently healing). C-Scope: external Hemorrhoids. H.Pylori sent. To continue IV Protonix 40 mg BID until discharge per GI with plan to transition to PO for 6-8 weeks. Patient cleared to start anticoagulation 5/1-Xarelto 20 mg PO Daily.   -CT Chest w/o to r/o post op bleed (CABG 3/2018): No acute thoracic pathology.

## 2018-05-02 NOTE — PHYSICAL THERAPY INITIAL EVALUATION ADULT - PERTINENT HX OF CURRENT PROBLEM, REHAB EVAL
77 year old male was found to be in acute diastolic CHF exacerbation with rate controlled Atrial Flutter in the setting of acute anemia. Patient is now s/p EGD and C-Scope today revealing clean base ulcer in duodenal bulb (likely cause of bleed). Patient will be cleared to start anticoagulation tomorrow per GI with plan for DCCV vs ablation Thursday.

## 2018-05-03 PROBLEM — G70.00 MYASTHENIA GRAVIS WITHOUT (ACUTE) EXACERBATION: Chronic | Status: ACTIVE | Noted: 2018-04-26

## 2018-05-03 PROBLEM — H33.21 SEROUS RETINAL DETACHMENT, RIGHT EYE: Chronic | Status: ACTIVE | Noted: 2018-04-26

## 2018-05-03 PROBLEM — I48.91 UNSPECIFIED ATRIAL FIBRILLATION: Chronic | Status: ACTIVE | Noted: 2018-04-26

## 2018-05-03 PROBLEM — Z85.118 PERSONAL HISTORY OF OTHER MALIGNANT NEOPLASM OF BRONCHUS AND LUNG: Chronic | Status: ACTIVE | Noted: 2018-04-26

## 2018-05-03 PROBLEM — I25.10 ATHEROSCLEROTIC HEART DISEASE OF NATIVE CORONARY ARTERY WITHOUT ANGINA PECTORIS: Chronic | Status: ACTIVE | Noted: 2018-04-26

## 2018-05-03 LAB
ALBUMIN SERPL ELPH-MCNC: 3.3 G/DL — SIGNIFICANT CHANGE UP (ref 3.3–5)
ALP SERPL-CCNC: 110 U/L — SIGNIFICANT CHANGE UP (ref 40–120)
ALT FLD-CCNC: 47 U/L — HIGH (ref 10–45)
ANION GAP SERPL CALC-SCNC: 9 MMOL/L — SIGNIFICANT CHANGE UP (ref 5–17)
AST SERPL-CCNC: 24 U/L — SIGNIFICANT CHANGE UP (ref 10–40)
BASOPHILS NFR BLD AUTO: 0.1 % — SIGNIFICANT CHANGE UP (ref 0–2)
BILIRUB SERPL-MCNC: 0.6 MG/DL — SIGNIFICANT CHANGE UP (ref 0.2–1.2)
BUN SERPL-MCNC: 21 MG/DL — SIGNIFICANT CHANGE UP (ref 7–23)
CALCIUM SERPL-MCNC: 9 MG/DL — SIGNIFICANT CHANGE UP (ref 8.4–10.5)
CHLORIDE SERPL-SCNC: 96 MMOL/L — SIGNIFICANT CHANGE UP (ref 96–108)
CO2 SERPL-SCNC: 33 MMOL/L — HIGH (ref 22–31)
CREAT SERPL-MCNC: 0.97 MG/DL — SIGNIFICANT CHANGE UP (ref 0.5–1.3)
EOSINOPHIL NFR BLD AUTO: 1.2 % — SIGNIFICANT CHANGE UP (ref 0–6)
GLUCOSE SERPL-MCNC: 95 MG/DL — SIGNIFICANT CHANGE UP (ref 70–99)
HCT VFR BLD CALC: 31.1 % — LOW (ref 39–50)
HGB BLD-MCNC: 9.6 G/DL — LOW (ref 13–17)
LYMPHOCYTES # BLD AUTO: 16 % — SIGNIFICANT CHANGE UP (ref 13–44)
MAGNESIUM SERPL-MCNC: 2.2 MG/DL — SIGNIFICANT CHANGE UP (ref 1.6–2.6)
MCHC RBC-ENTMCNC: 30 PG — SIGNIFICANT CHANGE UP (ref 27–34)
MCHC RBC-ENTMCNC: 30.9 G/DL — LOW (ref 32–36)
MCV RBC AUTO: 97.2 FL — SIGNIFICANT CHANGE UP (ref 80–100)
MONOCYTES NFR BLD AUTO: 11.6 % — SIGNIFICANT CHANGE UP (ref 2–14)
NEUTROPHILS NFR BLD AUTO: 71.1 % — SIGNIFICANT CHANGE UP (ref 43–77)
PLATELET # BLD AUTO: 324 K/UL — SIGNIFICANT CHANGE UP (ref 150–400)
POTASSIUM SERPL-MCNC: 3.5 MMOL/L — SIGNIFICANT CHANGE UP (ref 3.5–5.3)
POTASSIUM SERPL-SCNC: 3.5 MMOL/L — SIGNIFICANT CHANGE UP (ref 3.5–5.3)
PROT SERPL-MCNC: 6.2 G/DL — SIGNIFICANT CHANGE UP (ref 6–8.3)
RBC # BLD: 3.2 M/UL — LOW (ref 4.2–5.8)
RBC # FLD: 18.4 % — HIGH (ref 10.3–16.9)
SODIUM SERPL-SCNC: 138 MMOL/L — SIGNIFICANT CHANGE UP (ref 135–145)
WBC # BLD: 8.3 K/UL — SIGNIFICANT CHANGE UP (ref 3.8–10.5)
WBC # FLD AUTO: 8.3 K/UL — SIGNIFICANT CHANGE UP (ref 3.8–10.5)

## 2018-05-03 PROCEDURE — 99233 SBSQ HOSP IP/OBS HIGH 50: CPT

## 2018-05-03 PROCEDURE — 93312 ECHO TRANSESOPHAGEAL: CPT | Mod: 26

## 2018-05-03 PROCEDURE — 93320 DOPPLER ECHO COMPLETE: CPT | Mod: 26

## 2018-05-03 PROCEDURE — 93325 DOPPLER ECHO COLOR FLOW MAPG: CPT | Mod: 26

## 2018-05-03 PROCEDURE — 92960 CARDIOVERSION ELECTRIC EXT: CPT

## 2018-05-03 RX ORDER — METOPROLOL TARTRATE 50 MG
25 TABLET ORAL DAILY
Qty: 0 | Refills: 0 | Status: DISCONTINUED | OUTPATIENT
Start: 2018-05-04 | End: 2018-05-04

## 2018-05-03 RX ORDER — POTASSIUM CHLORIDE 20 MEQ
40 PACKET (EA) ORAL EVERY 4 HOURS
Qty: 0 | Refills: 0 | Status: COMPLETED | OUTPATIENT
Start: 2018-05-03 | End: 2018-05-03

## 2018-05-03 RX ORDER — ATORVASTATIN CALCIUM 80 MG/1
80 TABLET, FILM COATED ORAL AT BEDTIME
Qty: 0 | Refills: 0 | Status: DISCONTINUED | OUTPATIENT
Start: 2018-05-03 | End: 2018-05-04

## 2018-05-03 RX ADMIN — PANTOPRAZOLE SODIUM 40 MILLIGRAM(S): 20 TABLET, DELAYED RELEASE ORAL at 06:25

## 2018-05-03 RX ADMIN — PANTOPRAZOLE SODIUM 40 MILLIGRAM(S): 20 TABLET, DELAYED RELEASE ORAL at 18:54

## 2018-05-03 RX ADMIN — Medication 1000 UNIT(S): at 18:55

## 2018-05-03 RX ADMIN — PREGABALIN 1000 MICROGRAM(S): 225 CAPSULE ORAL at 18:55

## 2018-05-03 RX ADMIN — Medication 40 MILLIEQUIVALENT(S): at 18:55

## 2018-05-03 RX ADMIN — AMIODARONE HYDROCHLORIDE 200 MILLIGRAM(S): 400 TABLET ORAL at 06:26

## 2018-05-03 RX ADMIN — Medication 100 MILLIGRAM(S): at 06:25

## 2018-05-03 RX ADMIN — Medication 50 MILLIGRAM(S): at 06:25

## 2018-05-03 RX ADMIN — Medication 1 DROP(S): at 18:56

## 2018-05-03 RX ADMIN — SENNA PLUS 2 TABLET(S): 8.6 TABLET ORAL at 21:48

## 2018-05-03 RX ADMIN — Medication 5 MILLIGRAM(S): at 23:49

## 2018-05-03 RX ADMIN — Medication 100 MILLIGRAM(S): at 21:48

## 2018-05-03 RX ADMIN — Medication 40 MILLIGRAM(S): at 18:55

## 2018-05-03 RX ADMIN — Medication 40 MILLIEQUIVALENT(S): at 10:30

## 2018-05-03 RX ADMIN — RIVAROXABAN 20 MILLIGRAM(S): KIT at 18:56

## 2018-05-03 RX ADMIN — ATORVASTATIN CALCIUM 80 MILLIGRAM(S): 80 TABLET, FILM COATED ORAL at 21:48

## 2018-05-03 NOTE — PROGRESS NOTE ADULT - PROBLEM SELECTOR PLAN 8
-Lives with his wife. reports he is in cardiac rehab.   -PT evaluated and recommends home with home PT.     Dispo: Monitor CBC on Anti-coagulation. Plan for BRIAN followed by DCCV vs. ablation tomorrow 5/3/18 if no recurrence of bleed on AC.     Case discussed with Dr. Bonilla this AM. -Lives with his wife. reports he is in cardiac rehab.   -PT evaluated and recommends home with home PT.     DVT Prophylaxis: Xarelto  Dispo: Patient s/p DCCV now in NSR. Monitor overnight with probable discharge in AM if no events overnight.     Case discussed with Dr. Bonilla this AM.

## 2018-05-03 NOTE — PROGRESS NOTE ADULT - SUBJECTIVE AND OBJECTIVE BOX
Interventional Cardiology PA Adult Progress Note    CC: SOB, LE edema, Rapid Aflutter, Anemia   Subjective Assessment: Patient seen and examined at bedside post DCCV. Patient denies C/P, SOB, palpitations, dizziness, diaphoresis, N/V.     12 point Review of Systems otherwise negative except per Subjective   	  MEDICATIONS:  amiodarone    Tablet 200 milliGRAM(s) Oral daily  furosemide   Injectable 40 milliGRAM(s) IV Push two times a day  zaleplon 5 milliGRAM(s) Oral at bedtime PRN  docusate sodium 100 milliGRAM(s) Oral three times a day  pantoprazole  Injectable 40 milliGRAM(s) IV Push two times a day  senna 2 Tablet(s) Oral at bedtime  cholecalciferol 1000 Unit(s) Oral daily  cyanocobalamin 1000 MICROGram(s) Oral daily  latanoprost 0.005% Ophthalmic Solution 1 Drop(s) Both EYES at bedtime  potassium chloride    Tablet ER 40 milliEquivalent(s) Oral every 4 hours  rivaroxaban 20 milliGRAM(s) Oral every 24 hours  timolol 0.5% Solution 1 Drop(s) Both EYES two times a day      	    [PHYSICAL EXAM:  TELEMETRY:  T(C): 36.3 (05-03-18 @ 13:27), Max: 36.3 (05-03-18 @ 00:11)  HR: 62 (05-03-18 @ 10:30) (51 - 63)  BP: 124/65 (05-03-18 @ 10:30) (99/60 - 124/65)  RR: 18 (05-03-18 @ 10:30) (16 - 18)  SpO2: 98% (05-03-18 @ 10:30) (98% - 100%)  Wt(kg): --  I&O's Summary    02 May 2018 07:01  -  03 May 2018 07:00  --------------------------------------------------------  IN: 980 mL / OUT: 1500 mL / NET: -520 mL    03 May 2018 07:01  -  03 May 2018 15:27  --------------------------------------------------------  IN: 0 mL / OUT: 0 mL / NET: 0 mL        Jordan:  Central/PICC/Mid Line:                                         Appearance: Normal	  HEENT:   Normal oral mucosa, PERRL, EOMI	  Neck: Supple. No JVD.   Cardiovascular: + S1 S2. RRR. No murmurs, rubs or gallops  Respiratory: CTA Bilaterally. No rhonchi, wheezes, rales.   Gastrointestinal:  Soft, Non-tender, + BS	x 4.   Extremities: Normal range of motion, No clubbing, cyanosis or calf tenderness BLE. 2+pitting edema BLE    Neurologic: Non-focal  Psychiatry: A & O x 3.       	    ECG:  	  RADIOLOGY:   DIAGNOSTIC TESTING:  [ ] Echocardiogram:  [ ]  Catheterization:  [ ] Stress Test:    [ ] BRIAN  OTHER: 	    LABS:	 	  CARDIAC MARKERS:                  9.6    8.3   )-----------( 324      ( 03 May 2018 06:10 )             31.1     05-03    138  |  96  |  21  ----------------------------<  95  3.5   |  33<H>  |  0.97    Ca    9.0      03 May 2018 06:09  Mg     2.2     05-03    TPro  6.2  /  Alb  3.3  /  TBili  0.6  /  DBili  <0.2  /  AST  24  /  ALT  47<H>  /  AlkPhos  110  05-03    proBNP:   Lipid Profile:   HgA1c:   TSH: Interventional Cardiology PA Adult Progress Note    CC: SOB, LE edema, Aflutter, Anemia   Subjective Assessment: Patient seen and examined at bedside post DCCV. Patient denies C/P, SOB, palpitations, dizziness, diaphoresis, N/V.     12 point Review of Systems otherwise negative except per Subjective   	  MEDICATIONS:  amiodarone    Tablet 200 milliGRAM(s) Oral daily  furosemide   Injectable 40 milliGRAM(s) IV Push two times a day  zaleplon 5 milliGRAM(s) Oral at bedtime PRN  docusate sodium 100 milliGRAM(s) Oral three times a day  pantoprazole  Injectable 40 milliGRAM(s) IV Push two times a day  senna 2 Tablet(s) Oral at bedtime  cholecalciferol 1000 Unit(s) Oral daily  cyanocobalamin 1000 MICROGram(s) Oral daily  latanoprost 0.005% Ophthalmic Solution 1 Drop(s) Both EYES at bedtime  potassium chloride    Tablet ER 40 milliEquivalent(s) Oral every 4 hours  rivaroxaban 20 milliGRAM(s) Oral every 24 hours  timolol 0.5% Solution 1 Drop(s) Both EYES two times a day      	    [PHYSICAL EXAM:  TELEMETRY:  T(C): 36.3 (05-03-18 @ 13:27), Max: 36.3 (05-03-18 @ 00:11)  HR: 62 (05-03-18 @ 10:30) (51 - 63)  BP: 124/65 (05-03-18 @ 10:30) (99/60 - 124/65)  RR: 18 (05-03-18 @ 10:30) (16 - 18)  SpO2: 98% (05-03-18 @ 10:30) (98% - 100%)  Wt(kg): --  I&O's Summary    02 May 2018 07:01  -  03 May 2018 07:00  --------------------------------------------------------  IN: 980 mL / OUT: 1500 mL / NET: -520 mL    03 May 2018 07:01  -  03 May 2018 15:27  --------------------------------------------------------  IN: 0 mL / OUT: 0 mL / NET: 0 mL        Jordan:  Central/PICC/Mid Line:                                         Appearance: Normal	  HEENT:   Normal oral mucosa, PERRL, EOMI	  Neck: Supple. No JVD.   Cardiovascular: + S1 S2. RRR. No murmurs, rubs or gallops  Respiratory: CTA Bilaterally. No rhonchi, wheezes, rales.   Gastrointestinal:  Soft, Non-tender, + BS	x 4.   Extremities: Normal range of motion, No clubbing, cyanosis or calf tenderness BLE. 2+pitting edema BLE    Neurologic: Non-focal  Psychiatry: A & O x 3.       	    ECG:  	  RADIOLOGY:   DIAGNOSTIC TESTING:  [ ] Echocardiogram:  [ ]  Catheterization:  [ ] Stress Test:    [ ] BRIAN  OTHER: 	    LABS:	 	  CARDIAC MARKERS:                  9.6    8.3   )-----------( 324      ( 03 May 2018 06:10 )             31.1     05-03    138  |  96  |  21  ----------------------------<  95  3.5   |  33<H>  |  0.97    Ca    9.0      03 May 2018 06:09  Mg     2.2     05-03    TPro  6.2  /  Alb  3.3  /  TBili  0.6  /  DBili  <0.2  /  AST  24  /  ALT  47<H>  /  AlkPhos  110  05-03    proBNP:   Lipid Profile:   HgA1c:   TSH:

## 2018-05-03 NOTE — PROGRESS NOTE ADULT - PROBLEM SELECTOR PLAN 5
-s/p 3VCABG: 3/27/18 (LIMA-LAD, ALY-RI, SVG-PDA) at [place]  -CP free this AM. Troponin 0.02--0.03. No further troponin trend per Dr. Bonilla.   -Holding ASA in the setting of acute anemia.   -Continue Toprol XL 50 mg daily. Will hold Lipitor 80 mg daily for 48 hours in the setting of elevated LFTs.   - LDL: 33 and Hemoglobin A1c 4.7. -s/p 3VCABG: 3/27/18 (LIMA-LAD, ALY-RI, SVG-PDA) at [place]  -CP free this AM. Troponin 0.02--0.03. No further troponin trend per Dr. Bonilla.   -Holding ASA in the setting of acute anemia.   -Continue Toprol XL 50 mg daily. Resume Lipitor tonight given improvement in LFTs.  - LDL: 33 and Hemoglobin A1c 4.7.

## 2018-05-03 NOTE — PROGRESS NOTE ADULT - PROBLEM SELECTOR PLAN 2
-Elevated LFTs (on statin therapy/in addition may be hepatic congestion from CHF): Limited RUQ ultrasound 4/30: Cholelithiasis. No biliary dilatation.   -Lipitor 80 mg daily held (starting 4/30) to see if LFTs improve.  -Will continue to trend daily. AST normalized. ALT elevated at 54 but continues to improve. -Elevated LFTs (on statin therapy/in addition may be hepatic congestion from CHF): Limited RUQ ultrasound 4/30: Cholelithiasis. No biliary dilatation.   -Lipitor 80 mg daily held (starting 4/30) to see if LFTs improve. Will restart Lipitor tonight.   -Will continue to trend daily. AST normalized. ALT mildly elevated at 47 but continues to improve.

## 2018-05-03 NOTE — PROGRESS NOTE ADULT - PROBLEM SELECTOR PLAN 3
-BNP 2,579-- REPEAT BNP (4/29/2018): 1,516. Daily weights, strict I/Os. On exam: leg edema slightly improved since admission. Lungs CTA   -Patient was complaining of SOB 4/29 which has resolved  likely related to CHF and acute anemia. STAT CXR preliminary read (4/29/2018): negative for consolidation.   -CT chest (4/27/2018): No effusion.   -Echo (4/27/2018): mild AV thickening, mild LV thickening, mild MAC, trace MR, trace TR, LVEF 55-60%. No pericardial effusion.   -Continue Lasix 40 mg IV BID.  (if patient needs further transfusions may need additional Lasix)  -Leg Edema (3+ pitting initially and LE cool to touch ): Lower extremity venous Duplex 04/27 no DVT seen.  -Hypokalemia: K+3.3 this AM supplemented with Kdur 40 mg PO x two doses Magnesium WNL -BNP 2,579-- REPEAT BNP (4/29/2018): 1,516. Daily weights, strict I/Os. On exam: leg edema slightly improved since admission. Lungs CTA   -Patient was complaining of SOB 4/29 which has resolved  likely related to CHF and acute anemia. STAT CXR preliminary read (4/29/2018): negative for consolidation.   -CT chest (4/27/2018): No effusion.   -Echo (4/27/2018): mild AV thickening, mild LV thickening, mild MAC, trace MR, trace TR, LVEF 55-60%. No pericardial effusion.   -Continue Lasix 40 mg IV BID.  (if patient needs further transfusions may need additional Lasix)  -Leg Edema (3+ pitting initially and LE cool to touch ): Lower extremity venous Duplex 04/27 no DVT seen.  -Hypokalemia: K+3.5 this AM supplemented with Kdur 40 mg PO x two doses Magnesium WNL

## 2018-05-03 NOTE — PROGRESS NOTE ADULT - PROBLEM SELECTOR PLAN 4
-EP recs: Flutter rate controlled, need to restore to SR w/ DCCV or ablation.  -GI team has cleared patient for anticoagulation starting 5/1. Xarelto 20 mg PO Daily resumed. EP informed.   -Patient to be observed on AC until Thursday to ensure bleeding does not re-occur. CBC remains stable. NPO after midnight for BRIAN followed by DCCV vs. ablation.   -Currently rate controlled: Will continue Amiodarone 200 mg daily and Toprol Xl 50 mg daily. -EP recs: Flutter rate controlled  -GI team cleared patient for anticoagulation starting 5/1. Xarelto 20 mg PO Daily resumed.   -Patient observed on AC and Hgb/HCT remained stable.  -Patient s/p DCCV currently in NSR  -Continue Amiodarone 200 mg daily. Toprol XL reduced to 25 mg PO Daily given that patient is now in NSR

## 2018-05-03 NOTE — PROGRESS NOTE ADULT - PROBLEM SELECTOR PROBLEM 8
Physical therapy evaluation, initial

## 2018-05-03 NOTE — PROGRESS NOTE ADULT - ASSESSMENT
76 y/o man with hx lung Ca s/p RML resection (several years ago, followed at [facility], in remission as per patient),  s/p recent CABG at [facility] 3/27/18 (LIMA-LAD, ALY-RI, SVG-PDA), diagnosed with Atrial Flutter (4/10/2018) for which Amiodarone and Xarelto were initiated was referred to [facility] ED (4/27/2018) and was found to be in acute diastolic CHF exacerbation with rate controlled Atrial Flutter in the setting of acute anemia. Patient is now s/p EGD and C-Scope today revealing clean base ulcer in duodenal bulb (likely cause of bleed). Patient resumed Anti-coagulation 5/1 per GI with plan for DCCV vs ablation Thursday. 78 y/o man with hx lung Ca s/p RML resection (several years ago, followed at [facility], in remission as per patient),  s/p recent CABG at [facility] 3/27/18 (LIMA-LAD, ALY-RI, SVG-PDA), diagnosed with Atrial Flutter (4/10/2018) for which Amiodarone and Xarelto were initiated was referred to [facility] ED (4/27/2018) and was found to be in acute diastolic CHF exacerbation with rate controlled Atrial Flutter in the setting of acute anemia. Patient is now s/p EGD and C-Scope today revealing clean base ulcer in duodenal bulb (likely cause of bleed). Patient resumed Anti-coagulation 5/1 per GI and is now s/p DCCV currently in NSR.

## 2018-05-03 NOTE — PROGRESS NOTE ADULT - PROVIDER SPECIALTY LIST ADULT
Cardiology
Electrophysiology
Intervent Cardiology
Gastroenterology
Cardiology

## 2018-05-03 NOTE — PROGRESS NOTE ADULT - ATTENDING COMMENTS
Ok to anticoagulate as per GI. Will start anticoagulation, plan for BRIAN DCC vs ablation.
Assessment: Patient personally seen and examined myself during rounds with the Physician Assistant/House Staff/Nurse Practitioner     Physician Assistant/House Staff/Nurse Practitioner note read, including vitals, physical findings, laboratory data, and radiological reports.   Revisions included below.   Direct personal management at bed side and extensive interpretation of the data.    Plan was outlined and discussed in details with the Physician Assistant/House Staff/Nurse Practitioner.    Decision making of high complexity   Risk high of complications, morbidity, and/or mortality  Assessment and Action taken for acute disease activity to reflect the level of care provided:  -Hemodynamic evaluation and support     -RCRI/Ortega CV score LOW - cardiac Optimized for GI procedure    -Cardiac Telemetry reviewed - afl  -Medication reconciliation  -Review laboratory data  -EKG reviewed - afl no stemi  -Echo reviewed   -Interdisciplinary discussion with IC / EP / HF / CTS teams as needed  TIME SPENT in evaluation and management, reassessments, review and interpretation of labs and x-rays, and hemodynamic management, formulating a plan and coordinating care: ___35____ MIN.  Time does not include procedural time.
Assessment: Patient personally seen and examined myself during rounds with the Physician Assistant/House Staff/Nurse Practitioner     Physician Assistant/House Staff/Nurse Practitioner note read, including vitals, physical findings, laboratory data, and radiological reports.   Revisions included below.   Direct personal management at bed side and extensive interpretation of the data.    Plan was outlined and discussed in details with the Physician Assistant/House Staff/Nurse Practitioner.    Decision making of high complexity   Risk high of complications, morbidity, and/or mortality  Assessment and Action taken for acute disease activity to reflect the level of care provided:  -Hemodynamic evaluation and support - afib not rate controlled   -ACS assessment and treatment as applicable  -Heart failure assessment and treatment as applicable - decompensated continue diuresis  -Cardiac Telemetry reviewed - remains in afib likely causing failure and edema   -Medication reconciliation  -Review laboratory data - GI input appreciated - bleeding source identified observing hgb on ac for no acute bleed  -EKG reviewed   -Echo reviewed   -Interdisciplinary discussion with IC / EP / HF / CTS teams as needed  TIME SPENT in evaluation and management, reassessments, review and interpretation of labs and x-rays, and hemodynamic management, formulating a plan and coordinating care: ___35____ MIN.  Time does not include procedural time.
Assessment: Patient personally seen and examined myself during rounds with the Physician Assistant/House Staff/Nurse Practitioner     Physician Assistant/House Staff/Nurse Practitioner note read, including vitals, physical findings, laboratory data, and radiological reports.   Revisions included below.   Direct personal management at bed side and extensive interpretation of the data.    Plan was outlined and discussed in details with the Physician Assistant/House Staff/Nurse Practitioner.    Decision making of high complexity   Risk high of complications, morbidity, and/or mortality  Assessment and Action taken for acute disease activity to reflect the level of care provided:  -Hemodynamic evaluation and support - adfibe not rate controlled   -ACS assessment and treatment as applicable  -Heart failure assessment and treatment as applicable - decompensated continue diuresis  -Cardiac Telemetry reviewed  -Medication reconciliation  -Review laboratory data - GI input appreciated - bleeding source identified observing hgb on ac for no acute bleed  -EKG reviewed   -Echo reviewed   -Interdisciplinary discussion with IC / EP / HF / CTS teams as needed  TIME SPENT in evaluation and management, reassessments, review and interpretation of labs and x-rays, and hemodynamic management, formulating a plan and coordinating care: ___35____ MIN.  Time does not include procedural time.
Assessment: Patient personally seen and examined myself during rounds with the Physician Assistant/House Staff/Nurse Practitioner     Physician Assistant/House Staff/Nurse Practitioner note read, including vitals, physical findings, laboratory data, and radiological reports.   Revisions included below.   Direct personal management at bed side and extensive interpretation of the data.    Plan was outlined and discussed in details with the Physician Assistant/House Staff/Nurse Practitioner.    Decision making of high complexity   Risk high of complications, morbidity, and/or mortality  Assessment and Action taken for acute disease activity to reflect the level of care provided:  -Hemodynamic evaluation and support  -ACS assessment and treatment as applicable  -Heart failure assessment and treatment as applicable  -Cardiac Telemetry reviewed  -Medication reconciliation  -Review laboratory data  -EKG reviewed   -Echo reviewed   s/p cardioversion, will need to monitor so s/s arrythmic reversion and /or hemodynamic instablily  -Interdisciplinary discussion with IC / EP / HF / CTS teams as needed  TIME SPENT in evaluation and management, reassessments, review and interpretation of labs and x-rays, and hemodynamic management, formulating a plan and coordinating care: ___35____ MIN.  Time does not include procedural time.
Assessment: Patient personally seen and examined myself during rounds with the Physician Assistant/House Staff/Nurse Practitioner     Physician Assistant/House Staff/Nurse Practitioner note read, including vitals, physical findings, laboratory data, and radiological reports.   Revisions included below.   Direct personal management at bed side and extensive interpretation of the data.    Plan was outlined and discussed in details with the Physician Assistant/House Staff/Nurse Practitioner.    Decision making of high complexity   Risk high of complications, morbidity, and/or mortality  Assessment and Action taken for acute disease activity to reflect the level of care provided:  -Hemodynamic evaluation and support - adfibe not rate controlled   -ACS assessment and treatment as applicable  -Heart failure assessment and treatment as applicable - decompensated continue diuresis  -Cardiac Telemetry reviewed  -Medication reconciliation  -Review laboratory data - GI input appreciated  -EKG reviewed   -Echo reviewed   -Interdisciplinary discussion with IC / EP / HF / CTS teams as needed  TIME SPENT in evaluation and management, reassessments, review and interpretation of labs and x-rays, and hemodynamic management, formulating a plan and coordinating care: ___35____ MIN.  Time does not include procedural time.

## 2018-05-04 VITALS
SYSTOLIC BLOOD PRESSURE: 130 MMHG | HEART RATE: 68 BPM | RESPIRATION RATE: 17 BRPM | OXYGEN SATURATION: 98 % | DIASTOLIC BLOOD PRESSURE: 60 MMHG

## 2018-05-04 LAB
ALBUMIN SERPL ELPH-MCNC: 3.4 G/DL — SIGNIFICANT CHANGE UP (ref 3.3–5)
ALP SERPL-CCNC: 135 U/L — HIGH (ref 40–120)
ALT FLD-CCNC: 51 U/L — HIGH (ref 10–45)
ANION GAP SERPL CALC-SCNC: 11 MMOL/L — SIGNIFICANT CHANGE UP (ref 5–17)
AST SERPL-CCNC: 36 U/L — SIGNIFICANT CHANGE UP (ref 10–40)
BILIRUB SERPL-MCNC: 0.6 MG/DL — SIGNIFICANT CHANGE UP (ref 0.2–1.2)
BUN SERPL-MCNC: 23 MG/DL — SIGNIFICANT CHANGE UP (ref 7–23)
CALCIUM SERPL-MCNC: 9.2 MG/DL — SIGNIFICANT CHANGE UP (ref 8.4–10.5)
CHLORIDE SERPL-SCNC: 96 MMOL/L — SIGNIFICANT CHANGE UP (ref 96–108)
CO2 SERPL-SCNC: 32 MMOL/L — HIGH (ref 22–31)
CREAT SERPL-MCNC: 1 MG/DL — SIGNIFICANT CHANGE UP (ref 0.5–1.3)
GLUCOSE SERPL-MCNC: 122 MG/DL — HIGH (ref 70–99)
HCT VFR BLD CALC: 31.1 % — LOW (ref 39–50)
HGB BLD-MCNC: 9.1 G/DL — LOW (ref 13–17)
MAGNESIUM SERPL-MCNC: 2.3 MG/DL — SIGNIFICANT CHANGE UP (ref 1.6–2.6)
MCHC RBC-ENTMCNC: 29.3 G/DL — LOW (ref 32–36)
MCHC RBC-ENTMCNC: 29.5 PG — SIGNIFICANT CHANGE UP (ref 27–34)
MCV RBC AUTO: 101 FL — HIGH (ref 80–100)
PLATELET # BLD AUTO: 363 K/UL — SIGNIFICANT CHANGE UP (ref 150–400)
POTASSIUM SERPL-MCNC: 3.9 MMOL/L — SIGNIFICANT CHANGE UP (ref 3.5–5.3)
POTASSIUM SERPL-SCNC: 3.9 MMOL/L — SIGNIFICANT CHANGE UP (ref 3.5–5.3)
PROT SERPL-MCNC: 6.4 G/DL — SIGNIFICANT CHANGE UP (ref 6–8.3)
RBC # BLD: 3.08 M/UL — LOW (ref 4.2–5.8)
RBC # FLD: 17.8 % — HIGH (ref 10.3–16.9)
SODIUM SERPL-SCNC: 139 MMOL/L — SIGNIFICANT CHANGE UP (ref 135–145)
WBC # BLD: 8.3 K/UL — SIGNIFICANT CHANGE UP (ref 3.8–10.5)
WBC # FLD AUTO: 8.3 K/UL — SIGNIFICANT CHANGE UP (ref 3.8–10.5)

## 2018-05-04 PROCEDURE — 96374 THER/PROPH/DIAG INJ IV PUSH: CPT

## 2018-05-04 PROCEDURE — 93970 EXTREMITY STUDY: CPT

## 2018-05-04 PROCEDURE — 86850 RBC ANTIBODY SCREEN: CPT

## 2018-05-04 PROCEDURE — 85610 PROTHROMBIN TIME: CPT

## 2018-05-04 PROCEDURE — 83036 HEMOGLOBIN GLYCOSYLATED A1C: CPT

## 2018-05-04 PROCEDURE — 83880 ASSAY OF NATRIURETIC PEPTIDE: CPT

## 2018-05-04 PROCEDURE — 85045 AUTOMATED RETICULOCYTE COUNT: CPT

## 2018-05-04 PROCEDURE — 82550 ASSAY OF CK (CPK): CPT

## 2018-05-04 PROCEDURE — 81001 URINALYSIS AUTO W/SCOPE: CPT

## 2018-05-04 PROCEDURE — 36430 TRANSFUSION BLD/BLD COMPNT: CPT

## 2018-05-04 PROCEDURE — 94640 AIRWAY INHALATION TREATMENT: CPT

## 2018-05-04 PROCEDURE — 83735 ASSAY OF MAGNESIUM: CPT

## 2018-05-04 PROCEDURE — 80053 COMPREHEN METABOLIC PANEL: CPT

## 2018-05-04 PROCEDURE — 80048 BASIC METABOLIC PNL TOTAL CA: CPT

## 2018-05-04 PROCEDURE — 93306 TTE W/DOPPLER COMPLETE: CPT

## 2018-05-04 PROCEDURE — 85730 THROMBOPLASTIN TIME PARTIAL: CPT

## 2018-05-04 PROCEDURE — P9016: CPT

## 2018-05-04 PROCEDURE — 71250 CT THORAX DX C-: CPT

## 2018-05-04 PROCEDURE — 82248 BILIRUBIN DIRECT: CPT

## 2018-05-04 PROCEDURE — 83605 ASSAY OF LACTIC ACID: CPT

## 2018-05-04 PROCEDURE — 82728 ASSAY OF FERRITIN: CPT

## 2018-05-04 PROCEDURE — 86900 BLOOD TYPING SEROLOGIC ABO: CPT

## 2018-05-04 PROCEDURE — 88305 TISSUE EXAM BY PATHOLOGIST: CPT

## 2018-05-04 PROCEDURE — 36415 COLL VENOUS BLD VENIPUNCTURE: CPT

## 2018-05-04 PROCEDURE — 93005 ELECTROCARDIOGRAM TRACING: CPT

## 2018-05-04 PROCEDURE — 76705 ECHO EXAM OF ABDOMEN: CPT

## 2018-05-04 PROCEDURE — 83550 IRON BINDING TEST: CPT

## 2018-05-04 PROCEDURE — 71045 X-RAY EXAM CHEST 1 VIEW: CPT

## 2018-05-04 PROCEDURE — 80061 LIPID PANEL: CPT

## 2018-05-04 PROCEDURE — 93312 ECHO TRANSESOPHAGEAL: CPT

## 2018-05-04 PROCEDURE — 99238 HOSP IP/OBS DSCHRG MGMT 30/<: CPT

## 2018-05-04 PROCEDURE — 85027 COMPLETE CBC AUTOMATED: CPT

## 2018-05-04 PROCEDURE — 88341 IMHCHEM/IMCYTCHM EA ADD ANTB: CPT

## 2018-05-04 PROCEDURE — 86901 BLOOD TYPING SEROLOGIC RH(D): CPT

## 2018-05-04 PROCEDURE — 82553 CREATINE MB FRACTION: CPT

## 2018-05-04 PROCEDURE — 86923 COMPATIBILITY TEST ELECTRIC: CPT

## 2018-05-04 PROCEDURE — 84484 ASSAY OF TROPONIN QUANT: CPT

## 2018-05-04 PROCEDURE — 97161 PT EVAL LOW COMPLEX 20 MIN: CPT

## 2018-05-04 PROCEDURE — 85025 COMPLETE CBC W/AUTO DIFF WBC: CPT

## 2018-05-04 PROCEDURE — 87086 URINE CULTURE/COLONY COUNT: CPT

## 2018-05-04 PROCEDURE — 97116 GAIT TRAINING THERAPY: CPT

## 2018-05-04 PROCEDURE — 86677 HELICOBACTER PYLORI ANTIBODY: CPT

## 2018-05-04 PROCEDURE — 80076 HEPATIC FUNCTION PANEL: CPT

## 2018-05-04 PROCEDURE — 99291 CRITICAL CARE FIRST HOUR: CPT | Mod: 25

## 2018-05-04 RX ORDER — METOPROLOL TARTRATE 50 MG
1 TABLET ORAL
Qty: 30 | Refills: 3 | OUTPATIENT
Start: 2018-05-04 | End: 2018-08-31

## 2018-05-04 RX ORDER — PANTOPRAZOLE SODIUM 20 MG/1
1 TABLET, DELAYED RELEASE ORAL
Qty: 30 | Refills: 2 | OUTPATIENT
Start: 2018-05-04 | End: 2018-08-01

## 2018-05-04 RX ORDER — METOPROLOL TARTRATE 50 MG
1 TABLET ORAL
Qty: 0 | Refills: 0 | COMMUNITY
Start: 2018-05-04

## 2018-05-04 RX ORDER — FUROSEMIDE 40 MG
1 TABLET ORAL
Qty: 60 | Refills: 3 | OUTPATIENT
Start: 2018-05-04 | End: 2018-08-31

## 2018-05-04 RX ORDER — ASPIRIN/CALCIUM CARB/MAGNESIUM 324 MG
1 TABLET ORAL
Qty: 0 | Refills: 0 | COMMUNITY

## 2018-05-04 RX ORDER — METOPROLOL TARTRATE 50 MG
1 TABLET ORAL
Qty: 0 | Refills: 0 | COMMUNITY

## 2018-05-04 RX ADMIN — Medication 100 MILLIGRAM(S): at 05:49

## 2018-05-04 RX ADMIN — PANTOPRAZOLE SODIUM 40 MILLIGRAM(S): 20 TABLET, DELAYED RELEASE ORAL at 05:49

## 2018-05-04 RX ADMIN — PREGABALIN 1000 MICROGRAM(S): 225 CAPSULE ORAL at 12:26

## 2018-05-04 RX ADMIN — AMIODARONE HYDROCHLORIDE 200 MILLIGRAM(S): 400 TABLET ORAL at 05:49

## 2018-05-04 RX ADMIN — Medication 40 MILLIGRAM(S): at 05:49

## 2018-05-04 RX ADMIN — Medication 25 MILLIGRAM(S): at 05:49

## 2018-05-04 RX ADMIN — Medication 1000 UNIT(S): at 12:57

## 2018-05-09 DIAGNOSIS — Z85.118 PERSONAL HISTORY OF OTHER MALIGNANT NEOPLASM OF BRONCHUS AND LUNG: ICD-10-CM

## 2018-05-09 DIAGNOSIS — I50.31 ACUTE DIASTOLIC (CONGESTIVE) HEART FAILURE: ICD-10-CM

## 2018-05-09 DIAGNOSIS — L98.9 DISORDER OF THE SKIN AND SUBCUTANEOUS TISSUE, UNSPECIFIED: ICD-10-CM

## 2018-05-09 DIAGNOSIS — I10 ESSENTIAL (PRIMARY) HYPERTENSION: ICD-10-CM

## 2018-05-09 DIAGNOSIS — E87.6 HYPOKALEMIA: ICD-10-CM

## 2018-05-09 DIAGNOSIS — I48.0 PAROXYSMAL ATRIAL FIBRILLATION: ICD-10-CM

## 2018-05-09 DIAGNOSIS — I25.10 ATHEROSCLEROTIC HEART DISEASE OF NATIVE CORONARY ARTERY WITHOUT ANGINA PECTORIS: ICD-10-CM

## 2018-05-09 DIAGNOSIS — Z95.1 PRESENCE OF AORTOCORONARY BYPASS GRAFT: ICD-10-CM

## 2018-05-09 DIAGNOSIS — K92.2 GASTROINTESTINAL HEMORRHAGE, UNSPECIFIED: ICD-10-CM

## 2018-05-09 DIAGNOSIS — D62 ACUTE POSTHEMORRHAGIC ANEMIA: ICD-10-CM

## 2018-05-09 DIAGNOSIS — I48.92 UNSPECIFIED ATRIAL FLUTTER: ICD-10-CM

## 2018-05-09 DIAGNOSIS — G70.00 MYASTHENIA GRAVIS WITHOUT (ACUTE) EXACERBATION: ICD-10-CM

## 2018-05-10 ENCOUNTER — APPOINTMENT (OUTPATIENT)
Dept: HEART AND VASCULAR | Facility: CLINIC | Age: 78
End: 2018-05-10
Payer: MEDICARE

## 2018-05-10 VITALS
BODY MASS INDEX: 21.47 KG/M2 | DIASTOLIC BLOOD PRESSURE: 64 MMHG | WEIGHT: 150 LBS | OXYGEN SATURATION: 97 % | TEMPERATURE: 97.7 F | HEIGHT: 70 IN | SYSTOLIC BLOOD PRESSURE: 120 MMHG | HEART RATE: 68 BPM

## 2018-05-10 DIAGNOSIS — Z86.79 PERSONAL HISTORY OF OTHER DISEASES OF THE CIRCULATORY SYSTEM: ICD-10-CM

## 2018-05-10 DIAGNOSIS — Z80.9 FAMILY HISTORY OF MALIGNANT NEOPLASM, UNSPECIFIED: ICD-10-CM

## 2018-05-10 DIAGNOSIS — Z82.49 FAMILY HISTORY OF ISCHEMIC HEART DISEASE AND OTHER DISEASES OF THE CIRCULATORY SYSTEM: ICD-10-CM

## 2018-05-10 DIAGNOSIS — Z85.118 PERSONAL HISTORY OF OTHER MALIGNANT NEOPLASM OF BRONCHUS AND LUNG: ICD-10-CM

## 2018-05-10 PROCEDURE — 93000 ELECTROCARDIOGRAM COMPLETE: CPT

## 2018-05-10 PROCEDURE — 99214 OFFICE O/P EST MOD 30 MIN: CPT

## 2018-05-10 RX ORDER — PNV NO.95/FERROUS FUM/FOLIC AC 28MG-0.8MG
100 TABLET ORAL DAILY
Refills: 0 | Status: ACTIVE | COMMUNITY

## 2018-05-10 RX ORDER — UBIDECARENONE/VIT E ACET 100MG-5
1000 CAPSULE ORAL
Refills: 0 | Status: ACTIVE | COMMUNITY

## 2018-05-12 ENCOUNTER — TRANSCRIPTION ENCOUNTER (OUTPATIENT)
Age: 78
End: 2018-05-12

## 2018-05-22 ENCOUNTER — APPOINTMENT (OUTPATIENT)
Dept: HEART AND VASCULAR | Facility: CLINIC | Age: 78
End: 2018-05-22
Payer: MEDICARE

## 2018-05-22 VITALS
WEIGHT: 150 LBS | SYSTOLIC BLOOD PRESSURE: 116 MMHG | BODY MASS INDEX: 21.52 KG/M2 | OXYGEN SATURATION: 100 % | DIASTOLIC BLOOD PRESSURE: 58 MMHG | HEART RATE: 63 BPM

## 2018-05-22 PROCEDURE — 99215 OFFICE O/P EST HI 40 MIN: CPT

## 2018-05-23 ENCOUNTER — APPOINTMENT (OUTPATIENT)
Dept: HEART AND VASCULAR | Facility: CLINIC | Age: 78
End: 2018-05-23
Payer: MEDICARE

## 2018-05-23 VITALS
WEIGHT: 147 LBS | HEIGHT: 70 IN | BODY MASS INDEX: 21.05 KG/M2 | HEART RATE: 64 BPM | DIASTOLIC BLOOD PRESSURE: 60 MMHG | SYSTOLIC BLOOD PRESSURE: 110 MMHG

## 2018-05-23 LAB
ALBUMIN SERPL ELPH-MCNC: 4 G/DL
ALP BLD-CCNC: 123 U/L
ALT SERPL-CCNC: 38 U/L
ANION GAP SERPL CALC-SCNC: 7 MMOL/L
AST SERPL-CCNC: 33 U/L
BASOPHILS # BLD AUTO: 0.03 K/UL
BASOPHILS NFR BLD AUTO: 0.5 %
BILIRUB DIRECT SERPL-MCNC: 0.2 MG/DL
BILIRUB INDIRECT SERPL-MCNC: 0.4 MG/DL
BILIRUB SERPL-MCNC: 0.6 MG/DL
BUN SERPL-MCNC: 19 MG/DL
CALCIUM SERPL-MCNC: 9.9 MG/DL
CHLORIDE SERPL-SCNC: 96 MMOL/L
CO2 SERPL-SCNC: 39 MMOL/L
CREAT SERPL-MCNC: 1.13 MG/DL
EOSINOPHIL # BLD AUTO: 0.13 K/UL
EOSINOPHIL NFR BLD AUTO: 2 %
GLUCOSE SERPL-MCNC: 92 MG/DL
HCT VFR BLD CALC: 31.9 %
HGB BLD-MCNC: 9.9 G/DL
IMM GRANULOCYTES NFR BLD AUTO: 0.2 %
LYMPHOCYTES # BLD AUTO: 0.85 K/UL
LYMPHOCYTES NFR BLD AUTO: 13.2 %
MAN DIFF?: NORMAL
MCHC RBC-ENTMCNC: 30.3 PG
MCHC RBC-ENTMCNC: 31 GM/DL
MCV RBC AUTO: 97.6 FL
MONOCYTES # BLD AUTO: 0.89 K/UL
MONOCYTES NFR BLD AUTO: 13.9 %
NEUTROPHILS # BLD AUTO: 4.51 K/UL
NEUTROPHILS NFR BLD AUTO: 70.2 %
PLATELET # BLD AUTO: 398 K/UL
POTASSIUM SERPL-SCNC: 3.9 MMOL/L
PROT SERPL-MCNC: 6.8 G/DL
RBC # BLD: 3.27 M/UL
RBC # FLD: 16.2 %
SODIUM SERPL-SCNC: 142 MMOL/L
T3FREE SERPL-MCNC: 1.85 PG/ML
T4 FREE SERPL-MCNC: 1.7 NG/DL
TSH SERPL-ACNC: 5 UIU/ML
WBC # FLD AUTO: 6.42 K/UL

## 2018-05-23 PROCEDURE — 99215 OFFICE O/P EST HI 40 MIN: CPT | Mod: 25

## 2018-05-23 PROCEDURE — 93000 ELECTROCARDIOGRAM COMPLETE: CPT

## 2018-05-25 ENCOUNTER — EMERGENCY (EMERGENCY)
Facility: HOSPITAL | Age: 78
LOS: 1 days | Discharge: HOME | End: 2018-05-25
Attending: EMERGENCY MEDICINE | Admitting: EMERGENCY MEDICINE
Payer: MEDICARE

## 2018-05-25 VITALS
OXYGEN SATURATION: 98 % | DIASTOLIC BLOOD PRESSURE: 61 MMHG | RESPIRATION RATE: 17 BRPM | SYSTOLIC BLOOD PRESSURE: 116 MMHG | TEMPERATURE: 98 F

## 2018-05-25 VITALS
TEMPERATURE: 98 F | OXYGEN SATURATION: 98 % | DIASTOLIC BLOOD PRESSURE: 72 MMHG | WEIGHT: 145.06 LBS | HEART RATE: 97 BPM | SYSTOLIC BLOOD PRESSURE: 133 MMHG | RESPIRATION RATE: 16 BRPM

## 2018-05-25 DIAGNOSIS — R07.89 OTHER CHEST PAIN: ICD-10-CM

## 2018-05-25 DIAGNOSIS — Z98.890 OTHER SPECIFIED POSTPROCEDURAL STATES: Chronic | ICD-10-CM

## 2018-05-25 DIAGNOSIS — Z95.1 PRESENCE OF AORTOCORONARY BYPASS GRAFT: Chronic | ICD-10-CM

## 2018-05-25 DIAGNOSIS — M25.472 EFFUSION, LEFT ANKLE: ICD-10-CM

## 2018-05-25 DIAGNOSIS — Z86.69 PERSONAL HISTORY OF OTHER DISEASES OF THE NERVOUS SYSTEM AND SENSE ORGANS: Chronic | ICD-10-CM

## 2018-05-25 DIAGNOSIS — M25.471 EFFUSION, RIGHT ANKLE: ICD-10-CM

## 2018-05-25 DIAGNOSIS — R06.02 SHORTNESS OF BREATH: ICD-10-CM

## 2018-05-25 DIAGNOSIS — Z79.899 OTHER LONG TERM (CURRENT) DRUG THERAPY: ICD-10-CM

## 2018-05-25 DIAGNOSIS — R60.0 LOCALIZED EDEMA: ICD-10-CM

## 2018-05-25 DIAGNOSIS — Z79.02 LONG TERM (CURRENT) USE OF ANTITHROMBOTICS/ANTIPLATELETS: ICD-10-CM

## 2018-05-25 LAB
ALBUMIN SERPL ELPH-MCNC: 3.9 G/DL — SIGNIFICANT CHANGE UP (ref 3.3–5)
ALP SERPL-CCNC: 99 U/L — SIGNIFICANT CHANGE UP (ref 40–120)
ALT FLD-CCNC: 29 U/L — SIGNIFICANT CHANGE UP (ref 10–45)
ANION GAP SERPL CALC-SCNC: 11 MMOL/L — SIGNIFICANT CHANGE UP (ref 5–17)
APTT BLD: 48 SEC — HIGH (ref 27.5–37.4)
AST SERPL-CCNC: 27 U/L — SIGNIFICANT CHANGE UP (ref 10–40)
BASOPHILS NFR BLD AUTO: 0.5 % — SIGNIFICANT CHANGE UP (ref 0–2)
BILIRUB SERPL-MCNC: 0.7 MG/DL — SIGNIFICANT CHANGE UP (ref 0.2–1.2)
BUN SERPL-MCNC: 22 MG/DL — SIGNIFICANT CHANGE UP (ref 7–23)
CALCIUM SERPL-MCNC: 9.6 MG/DL — SIGNIFICANT CHANGE UP (ref 8.4–10.5)
CHLORIDE SERPL-SCNC: 89 MMOL/L — LOW (ref 96–108)
CK MB CFR SERPL CALC: 2.6 NG/ML — SIGNIFICANT CHANGE UP (ref 0–6.7)
CK MB CFR SERPL CALC: 2.8 NG/ML — SIGNIFICANT CHANGE UP (ref 0–6.7)
CK SERPL-CCNC: 101 U/L — SIGNIFICANT CHANGE UP (ref 30–200)
CK SERPL-CCNC: 103 U/L — SIGNIFICANT CHANGE UP (ref 30–200)
CO2 SERPL-SCNC: 36 MMOL/L — HIGH (ref 22–31)
CREAT SERPL-MCNC: 1.05 MG/DL — SIGNIFICANT CHANGE UP (ref 0.5–1.3)
EOSINOPHIL NFR BLD AUTO: 1.7 % — SIGNIFICANT CHANGE UP (ref 0–6)
GLUCOSE SERPL-MCNC: 106 MG/DL — HIGH (ref 70–99)
HCT VFR BLD CALC: 30.5 % — LOW (ref 39–50)
HGB BLD-MCNC: 9.5 G/DL — LOW (ref 13–17)
INR BLD: 2.31 — HIGH (ref 0.88–1.16)
LYMPHOCYTES # BLD AUTO: 14.4 % — SIGNIFICANT CHANGE UP (ref 13–44)
MCHC RBC-ENTMCNC: 29.7 PG — SIGNIFICANT CHANGE UP (ref 27–34)
MCHC RBC-ENTMCNC: 31.1 G/DL — LOW (ref 32–36)
MCV RBC AUTO: 95.3 FL — SIGNIFICANT CHANGE UP (ref 80–100)
MONOCYTES NFR BLD AUTO: 14.5 % — HIGH (ref 2–14)
NEUTROPHILS NFR BLD AUTO: 68.9 % — SIGNIFICANT CHANGE UP (ref 43–77)
NT-PROBNP SERPL-SCNC: 512 PG/ML — HIGH (ref 0–300)
PLATELET # BLD AUTO: 356 K/UL — SIGNIFICANT CHANGE UP (ref 150–400)
POTASSIUM SERPL-MCNC: 3.3 MMOL/L — LOW (ref 3.5–5.3)
POTASSIUM SERPL-SCNC: 3.3 MMOL/L — LOW (ref 3.5–5.3)
PROT SERPL-MCNC: 7.8 G/DL — SIGNIFICANT CHANGE UP (ref 6–8.3)
PROTHROM AB SERPL-ACNC: 26.1 SEC — HIGH (ref 9.8–12.7)
RBC # BLD: 3.2 M/UL — LOW (ref 4.2–5.8)
RBC # FLD: 15.6 % — SIGNIFICANT CHANGE UP (ref 10.3–16.9)
SODIUM SERPL-SCNC: 136 MMOL/L — SIGNIFICANT CHANGE UP (ref 135–145)
TROPONIN T SERPL-MCNC: 0.02 NG/ML — HIGH (ref 0–0.01)
TROPONIN T SERPL-MCNC: 0.02 NG/ML — HIGH (ref 0–0.01)
WBC # BLD: 6.5 K/UL — SIGNIFICANT CHANGE UP (ref 3.8–10.5)
WBC # FLD AUTO: 6.5 K/UL — SIGNIFICANT CHANGE UP (ref 3.8–10.5)

## 2018-05-25 PROCEDURE — 82553 CREATINE MB FRACTION: CPT

## 2018-05-25 PROCEDURE — 71045 X-RAY EXAM CHEST 1 VIEW: CPT | Mod: 26

## 2018-05-25 PROCEDURE — 93010 ELECTROCARDIOGRAM REPORT: CPT

## 2018-05-25 PROCEDURE — 71045 X-RAY EXAM CHEST 1 VIEW: CPT

## 2018-05-25 PROCEDURE — 80053 COMPREHEN METABOLIC PANEL: CPT

## 2018-05-25 PROCEDURE — 96374 THER/PROPH/DIAG INJ IV PUSH: CPT

## 2018-05-25 PROCEDURE — 84484 ASSAY OF TROPONIN QUANT: CPT

## 2018-05-25 PROCEDURE — 83880 ASSAY OF NATRIURETIC PEPTIDE: CPT

## 2018-05-25 PROCEDURE — 93005 ELECTROCARDIOGRAM TRACING: CPT

## 2018-05-25 PROCEDURE — 82550 ASSAY OF CK (CPK): CPT

## 2018-05-25 PROCEDURE — 85025 COMPLETE CBC W/AUTO DIFF WBC: CPT

## 2018-05-25 PROCEDURE — 85730 THROMBOPLASTIN TIME PARTIAL: CPT

## 2018-05-25 PROCEDURE — 99284 EMERGENCY DEPT VISIT MOD MDM: CPT | Mod: 25

## 2018-05-25 PROCEDURE — 99285 EMERGENCY DEPT VISIT HI MDM: CPT | Mod: 25

## 2018-05-25 PROCEDURE — 85610 PROTHROMBIN TIME: CPT

## 2018-05-25 PROCEDURE — 36415 COLL VENOUS BLD VENIPUNCTURE: CPT

## 2018-05-25 RX ORDER — FUROSEMIDE 40 MG
60 TABLET ORAL ONCE
Qty: 0 | Refills: 0 | Status: COMPLETED | OUTPATIENT
Start: 2018-05-25 | End: 2018-05-25

## 2018-05-25 RX ORDER — AMIODARONE HYDROCHLORIDE 400 MG/1
1 TABLET ORAL
Qty: 0 | Refills: 0 | COMMUNITY

## 2018-05-25 RX ORDER — CHOLECALCIFEROL (VITAMIN D3) 125 MCG
1 CAPSULE ORAL
Qty: 0 | Refills: 0 | COMMUNITY

## 2018-05-25 RX ORDER — ATORVASTATIN CALCIUM 80 MG/1
1 TABLET, FILM COATED ORAL
Qty: 0 | Refills: 0 | COMMUNITY

## 2018-05-25 RX ORDER — PREGABALIN 225 MG/1
1 CAPSULE ORAL
Qty: 0 | Refills: 0 | COMMUNITY

## 2018-05-25 RX ORDER — SODIUM CHLORIDE 9 MG/ML
3 INJECTION INTRAMUSCULAR; INTRAVENOUS; SUBCUTANEOUS ONCE
Qty: 0 | Refills: 0 | Status: COMPLETED | OUTPATIENT
Start: 2018-05-25 | End: 2018-05-25

## 2018-05-25 RX ORDER — ASPIRIN/CALCIUM CARB/MAGNESIUM 324 MG
325 TABLET ORAL ONCE
Qty: 0 | Refills: 0 | Status: COMPLETED | OUTPATIENT
Start: 2018-05-25 | End: 2018-05-25

## 2018-05-25 RX ADMIN — SODIUM CHLORIDE 3 MILLILITER(S): 9 INJECTION INTRAMUSCULAR; INTRAVENOUS; SUBCUTANEOUS at 17:47

## 2018-05-25 RX ADMIN — Medication 60 MILLIGRAM(S): at 18:37

## 2018-05-25 RX ADMIN — Medication 325 MILLIGRAM(S): at 18:15

## 2018-05-25 NOTE — ED ADULT TRIAGE NOTE - CHIEF COMPLAINT QUOTE
intermittent chest tightness since yesterday ; associated "swollen ankles" ; MD changed medication-- is on a diuretic  and "I urinate a lot"; denies other symptoms

## 2018-05-25 NOTE — ED ADULT NURSE NOTE - OBJECTIVE STATEMENT
Pt reports chest heaviness for 2-3 weeks with SOB, and swelling to bilateral LE. Pt reports that PCP prescribed stronger diuretics recently. Pt denies dizziness, n/v or any other symptoms.

## 2018-05-25 NOTE — ED ADULT NURSE NOTE - CHPI ED SYMPTOMS NEG
no vomiting/no syncope/no chest pain/no cough/no dizziness/no diaphoresis/no back pain/no chills/no fever/no nausea

## 2018-05-25 NOTE — ED PROVIDER NOTE - OBJECTIVE STATEMENT
76 yo male with hx of 3-v CABG, atral fibrillation (on Xarelto), ?CHF p/w intermittent chest tightness since yesterday associated with GOODWIN and b/l swollen ankles ("although better from last week"). States he has had these issues since his CABG ([facility] ago [facility]), but over the past few days they seemed to have worsened. He has been seeing the NP in his cardiologist's office and states "they are trying to adjust my diuretics". No CP currently or SOB at rest. NO recent illness/ fevers/ chills/ N/V/D/ palpitations/ syncope/ neuro sxs.

## 2018-05-25 NOTE — ED PROVIDER NOTE - MEDICAL DECISION MAKING DETAILS
76 yo male with hx of 3-v CABG, atral fibrillation (on Xarelto), ?CHF p/w intermittent chest tightness since yesterday associated with GOODWIN and b/l swollen ankles. States he has had these issues since his CABG (2 months ago @[place]), but over the past few days they seemed to hae worsened. He has been seeing the NP in his cardiolgist's office and states "they are trying to adjust my diuretics". No CP currently or SOB at rest. NO recent illness/ fevers/ chills/ N/V/D/ palpitations/ syncope/ neuro sxs. Pt non-toxic appearing and feeling much better post IV Lasix. Urinated a few times while in ED and states "maybe they need to increase my diuretic". CXR with no acute findings. Trop X 2 stable. TO f/up outpt with cardiology. Stable for dc. Return precautions given. 76 yo male with hx of 3-v CABG, atral fibrillation (on Xarelto), ?CHF p/w intermittent chest tightness since yesterday associated with GOODWIN and b/l swollen ankles. States he has had these issues since his CABG (2 months ago @[place]), but over the past few days they seemed to hae worsened. He has been seeing the NP in his cardiolgist's office and states "they are trying to adjust my diuretics". No CP currently or SOB at rest. NO recent illness/ fevers/ chills/ N/V/D/ palpitations/ syncope/ neuro sxs. Pt non-toxic appearing and feeling much better post IV Lasix. Urinated a few times while in ED and states "maybe they need to increase my diuretic". CXR with no acute findings. Trop X 2 stable (0.02 X 2). BNP in 500s. Pt to f/up outpt with cardiology. Stable for dc. Return precautions given.

## 2018-05-25 NOTE — ED PROVIDER NOTE - PROGRESS NOTE DETAILS
post IV Lasix. Urinated a few times and feeling much better and states "maybe they need to increase my diuretic". Trop X 2 stable. TO f/up outpt with cardiology.

## 2018-05-29 ENCOUNTER — APPOINTMENT (OUTPATIENT)
Dept: HEART AND VASCULAR | Facility: CLINIC | Age: 78
End: 2018-05-29
Payer: MEDICARE

## 2018-05-29 VITALS
SYSTOLIC BLOOD PRESSURE: 107 MMHG | DIASTOLIC BLOOD PRESSURE: 57 MMHG | BODY MASS INDEX: 19.51 KG/M2 | OXYGEN SATURATION: 100 % | HEART RATE: 58 BPM | WEIGHT: 136 LBS

## 2018-05-29 DIAGNOSIS — D50.8 OTHER IRON DEFICIENCY ANEMIAS: ICD-10-CM

## 2018-05-29 PROCEDURE — 99214 OFFICE O/P EST MOD 30 MIN: CPT

## 2018-05-30 DIAGNOSIS — E87.6 HYPOKALEMIA: ICD-10-CM

## 2018-05-30 LAB
ALBUMIN SERPL ELPH-MCNC: 4.2 G/DL
ALP BLD-CCNC: 116 U/L
ALT SERPL-CCNC: 23 U/L
ANION GAP SERPL CALC-SCNC: 23 MMOL/L
AST SERPL-CCNC: 31 U/L
BILIRUB SERPL-MCNC: 0.8 MG/DL
BUN SERPL-MCNC: 36 MG/DL
CALCIUM SERPL-MCNC: 10.1 MG/DL
CHLORIDE SERPL-SCNC: 82 MMOL/L
CO2 SERPL-SCNC: 32 MMOL/L
CREAT SERPL-MCNC: 1.24 MG/DL
GLUCOSE SERPL-MCNC: 80 MG/DL
NT-PROBNP SERPL-MCNC: 430 PG/ML
POTASSIUM SERPL-SCNC: 3 MMOL/L
PROT SERPL-MCNC: 7.2 G/DL
SODIUM SERPL-SCNC: 137 MMOL/L

## 2018-06-08 ENCOUNTER — RX RENEWAL (OUTPATIENT)
Age: 78
End: 2018-06-08

## 2018-06-08 ENCOUNTER — OUTPATIENT (OUTPATIENT)
Dept: OUTPATIENT SERVICES | Facility: HOSPITAL | Age: 78
LOS: 1 days | End: 2018-06-08
Payer: MEDICARE

## 2018-06-08 ENCOUNTER — APPOINTMENT (OUTPATIENT)
Dept: CT IMAGING | Facility: HOSPITAL | Age: 78
End: 2018-06-08
Payer: MEDICARE

## 2018-06-08 DIAGNOSIS — Z86.69 PERSONAL HISTORY OF OTHER DISEASES OF THE NERVOUS SYSTEM AND SENSE ORGANS: Chronic | ICD-10-CM

## 2018-06-08 DIAGNOSIS — Z98.890 OTHER SPECIFIED POSTPROCEDURAL STATES: Chronic | ICD-10-CM

## 2018-06-08 DIAGNOSIS — Z95.1 PRESENCE OF AORTOCORONARY BYPASS GRAFT: Chronic | ICD-10-CM

## 2018-06-08 PROCEDURE — 75574 CT ANGIO HRT W/3D IMAGE: CPT | Mod: 26

## 2018-06-08 PROCEDURE — 75574 CT ANGIO HRT W/3D IMAGE: CPT

## 2018-06-12 ENCOUNTER — APPOINTMENT (OUTPATIENT)
Dept: HEART AND VASCULAR | Facility: CLINIC | Age: 78
End: 2018-06-12
Payer: MEDICARE

## 2018-06-12 ENCOUNTER — OUTPATIENT (OUTPATIENT)
Dept: OUTPATIENT SERVICES | Facility: HOSPITAL | Age: 78
LOS: 1 days | End: 2018-06-12
Payer: MEDICARE

## 2018-06-12 VITALS
BODY MASS INDEX: 20.81 KG/M2 | DIASTOLIC BLOOD PRESSURE: 64 MMHG | SYSTOLIC BLOOD PRESSURE: 116 MMHG | WEIGHT: 145 LBS | OXYGEN SATURATION: 100 % | HEART RATE: 57 BPM

## 2018-06-12 DIAGNOSIS — Z98.890 OTHER SPECIFIED POSTPROCEDURAL STATES: Chronic | ICD-10-CM

## 2018-06-12 DIAGNOSIS — Z95.1 PRESENCE OF AORTOCORONARY BYPASS GRAFT: Chronic | ICD-10-CM

## 2018-06-12 DIAGNOSIS — Z86.69 PERSONAL HISTORY OF OTHER DISEASES OF THE NERVOUS SYSTEM AND SENSE ORGANS: Chronic | ICD-10-CM

## 2018-06-12 PROCEDURE — 71046 X-RAY EXAM CHEST 2 VIEWS: CPT | Mod: 26

## 2018-06-12 PROCEDURE — 99214 OFFICE O/P EST MOD 30 MIN: CPT

## 2018-06-12 PROCEDURE — 71046 X-RAY EXAM CHEST 2 VIEWS: CPT

## 2018-06-19 ENCOUNTER — OTHER (OUTPATIENT)
Age: 78
End: 2018-06-19

## 2018-06-20 ENCOUNTER — RX RENEWAL (OUTPATIENT)
Age: 78
End: 2018-06-20

## 2018-06-21 ENCOUNTER — APPOINTMENT (OUTPATIENT)
Dept: HEART AND VASCULAR | Facility: CLINIC | Age: 78
End: 2018-06-21
Payer: MEDICARE

## 2018-06-21 ENCOUNTER — LABORATORY RESULT (OUTPATIENT)
Age: 78
End: 2018-06-21

## 2018-06-21 PROCEDURE — 36415 COLL VENOUS BLD VENIPUNCTURE: CPT

## 2018-06-25 ENCOUNTER — APPOINTMENT (OUTPATIENT)
Dept: HEART AND VASCULAR | Facility: CLINIC | Age: 78
End: 2018-06-25
Payer: MEDICARE

## 2018-06-25 VITALS
DIASTOLIC BLOOD PRESSURE: 69 MMHG | HEIGHT: 70 IN | TEMPERATURE: 96.2 F | BODY MASS INDEX: 19.47 KG/M2 | WEIGHT: 136 LBS | OXYGEN SATURATION: 100 % | HEART RATE: 48 BPM | SYSTOLIC BLOOD PRESSURE: 109 MMHG

## 2018-06-25 PROCEDURE — 99214 OFFICE O/P EST MOD 30 MIN: CPT

## 2018-06-25 PROCEDURE — 93000 ELECTROCARDIOGRAM COMPLETE: CPT

## 2018-06-25 RX ORDER — FUROSEMIDE 40 MG/1
40 TABLET ORAL DAILY
Qty: 90 | Refills: 3 | Status: COMPLETED | COMMUNITY
End: 2018-06-25

## 2018-06-25 RX ORDER — METOPROLOL SUCCINATE 25 MG/1
25 TABLET, EXTENDED RELEASE ORAL DAILY
Refills: 0 | Status: COMPLETED | COMMUNITY
End: 2018-06-25

## 2018-06-25 RX ORDER — METOLAZONE 2.5 MG/1
2.5 TABLET ORAL DAILY
Qty: 30 | Refills: 0 | Status: COMPLETED | COMMUNITY
Start: 2018-05-22 | End: 2018-06-25

## 2018-06-28 ENCOUNTER — FORM ENCOUNTER (OUTPATIENT)
Age: 78
End: 2018-06-28

## 2018-06-28 ENCOUNTER — RX RENEWAL (OUTPATIENT)
Age: 78
End: 2018-06-28

## 2018-06-29 ENCOUNTER — OUTPATIENT (OUTPATIENT)
Dept: OUTPATIENT SERVICES | Facility: HOSPITAL | Age: 78
LOS: 1 days | End: 2018-06-29
Payer: MEDICARE

## 2018-06-29 DIAGNOSIS — R06.02 SHORTNESS OF BREATH: ICD-10-CM

## 2018-06-29 DIAGNOSIS — Z86.69 PERSONAL HISTORY OF OTHER DISEASES OF THE NERVOUS SYSTEM AND SENSE ORGANS: Chronic | ICD-10-CM

## 2018-06-29 DIAGNOSIS — Z98.890 OTHER SPECIFIED POSTPROCEDURAL STATES: Chronic | ICD-10-CM

## 2018-06-29 DIAGNOSIS — Z95.1 PRESENCE OF AORTOCORONARY BYPASS GRAFT: Chronic | ICD-10-CM

## 2018-06-29 PROCEDURE — 93306 TTE W/DOPPLER COMPLETE: CPT

## 2018-06-29 PROCEDURE — 93306 TTE W/DOPPLER COMPLETE: CPT | Mod: 26

## 2018-07-02 ENCOUNTER — OTHER (OUTPATIENT)
Age: 78
End: 2018-07-02

## 2018-07-05 ENCOUNTER — OTHER (OUTPATIENT)
Age: 78
End: 2018-07-05

## 2018-07-11 ENCOUNTER — APPOINTMENT (OUTPATIENT)
Dept: HEART AND VASCULAR | Facility: CLINIC | Age: 78
End: 2018-07-11
Payer: MEDICARE

## 2018-07-11 VITALS — DIASTOLIC BLOOD PRESSURE: 60 MMHG | HEART RATE: 54 BPM | SYSTOLIC BLOOD PRESSURE: 137 MMHG

## 2018-07-11 VITALS
BODY MASS INDEX: 20.76 KG/M2 | OXYGEN SATURATION: 98 % | HEIGHT: 70 IN | HEART RATE: 59 BPM | WEIGHT: 145 LBS | TEMPERATURE: 97.7 F | DIASTOLIC BLOOD PRESSURE: 62 MMHG | SYSTOLIC BLOOD PRESSURE: 130 MMHG

## 2018-07-11 PROCEDURE — 93000 ELECTROCARDIOGRAM COMPLETE: CPT | Mod: 77

## 2018-07-11 PROCEDURE — 93000 ELECTROCARDIOGRAM COMPLETE: CPT

## 2018-07-11 PROCEDURE — 99215 OFFICE O/P EST HI 40 MIN: CPT | Mod: 25

## 2018-07-11 PROCEDURE — 99214 OFFICE O/P EST MOD 30 MIN: CPT

## 2018-07-11 RX ORDER — AMIODARONE HYDROCHLORIDE 200 MG/1
200 TABLET ORAL DAILY
Qty: 90 | Refills: 3 | Status: COMPLETED | COMMUNITY
End: 2018-07-11

## 2018-07-23 ENCOUNTER — RX RENEWAL (OUTPATIENT)
Age: 78
End: 2018-07-23

## 2018-07-24 ENCOUNTER — RX RENEWAL (OUTPATIENT)
Age: 78
End: 2018-07-24

## 2018-07-25 ENCOUNTER — RX RENEWAL (OUTPATIENT)
Age: 78
End: 2018-07-25

## 2018-07-25 ENCOUNTER — APPOINTMENT (OUTPATIENT)
Dept: OPHTHALMOLOGY | Facility: CLINIC | Age: 78
End: 2018-07-25
Payer: MEDICARE

## 2018-07-25 PROCEDURE — 92225: CPT | Mod: LT

## 2018-07-25 PROCEDURE — 92004 COMPRE OPH EXAM NEW PT 1/>: CPT

## 2018-08-01 ENCOUNTER — OTHER (OUTPATIENT)
Age: 78
End: 2018-08-01

## 2018-08-09 ENCOUNTER — APPOINTMENT (OUTPATIENT)
Dept: HEART AND VASCULAR | Facility: CLINIC | Age: 78
End: 2018-08-09

## 2018-08-22 ENCOUNTER — APPOINTMENT (OUTPATIENT)
Dept: OPHTHALMOLOGY | Facility: CLINIC | Age: 78
End: 2018-08-22

## 2018-08-24 ENCOUNTER — LABORATORY RESULT (OUTPATIENT)
Age: 78
End: 2018-08-24

## 2018-08-24 ENCOUNTER — APPOINTMENT (OUTPATIENT)
Dept: INTERNAL MEDICINE | Facility: CLINIC | Age: 78
End: 2018-08-24
Payer: MEDICARE

## 2018-08-24 VITALS
OXYGEN SATURATION: 99 % | TEMPERATURE: 97.9 F | SYSTOLIC BLOOD PRESSURE: 133 MMHG | WEIGHT: 143 LBS | BODY MASS INDEX: 20.47 KG/M2 | DIASTOLIC BLOOD PRESSURE: 73 MMHG | HEIGHT: 70 IN | HEART RATE: 60 BPM

## 2018-08-24 PROCEDURE — 36415 COLL VENOUS BLD VENIPUNCTURE: CPT

## 2018-08-24 PROCEDURE — G0402 INITIAL PREVENTIVE EXAM: CPT

## 2018-08-24 NOTE — HISTORY OF PRESENT ILLNESS
[FreeTextEntry1] : History reviewed and patient examined [de-identified] : Multiple medical problems reviewed\par Atrial FIb S/P cardioversion. Remains on Xarelto\par History of GI Bleed; Ulcer;\par History of right middle lobe lobectomy;\par Cataracts;; \par Frequent urination\par 3- 4 times nocturia; No problems with prostate

## 2018-08-24 NOTE — ASSESSMENT
[FreeTextEntry1] : Multiple medical problems as outlined; Urinary frequency and will refer to Urology; . Also repeat labs obtained; Will see next again in 3 weeks;  o consider Flomax

## 2018-09-04 LAB
ALBUMIN SERPL ELPH-MCNC: 4.3 G/DL
ALP BLD-CCNC: 91 U/L
ALT SERPL-CCNC: 31 U/L
ANION GAP SERPL CALC-SCNC: 12 MMOL/L
APPEARANCE: CLEAR
AST SERPL-CCNC: 36 U/L
BASOPHILS # BLD AUTO: 0.02 K/UL
BASOPHILS NFR BLD AUTO: 0.3 %
BILIRUB SERPL-MCNC: 0.6 MG/DL
BILIRUBIN URINE: NEGATIVE
BLOOD URINE: NEGATIVE
BUN SERPL-MCNC: 22 MG/DL
CALCIUM SERPL-MCNC: 9.6 MG/DL
CHLORIDE SERPL-SCNC: 101 MMOL/L
CO2 SERPL-SCNC: 28 MMOL/L
COLOR: YELLOW
CREAT SERPL-MCNC: 0.89 MG/DL
EOSINOPHIL # BLD AUTO: 0.05 K/UL
EOSINOPHIL NFR BLD AUTO: 0.9 %
GLUCOSE QUALITATIVE U: NEGATIVE MG/DL
GLUCOSE SERPL-MCNC: 85 MG/DL
HCT VFR BLD CALC: 33.4 %
HGB BLD-MCNC: 10.3 G/DL
IMM GRANULOCYTES NFR BLD AUTO: 0.3 %
KETONES URINE: NEGATIVE
LEUKOCYTE ESTERASE URINE: ABNORMAL
LYMPHOCYTES # BLD AUTO: 1.06 K/UL
LYMPHOCYTES NFR BLD AUTO: 18.2 %
MAN DIFF?: NORMAL
MCHC RBC-ENTMCNC: 29.2 PG
MCHC RBC-ENTMCNC: 30.8 GM/DL
MCV RBC AUTO: 94.6 FL
MONOCYTES # BLD AUTO: 0.76 K/UL
MONOCYTES NFR BLD AUTO: 13 %
NEUTROPHILS # BLD AUTO: 3.92 K/UL
NEUTROPHILS NFR BLD AUTO: 67.3 %
NITRITE URINE: NEGATIVE
PH URINE: 6
PLATELET # BLD AUTO: 266 K/UL
POTASSIUM SERPL-SCNC: 4.2 MMOL/L
PROT SERPL-MCNC: 7.2 G/DL
PROTEIN URINE: NEGATIVE MG/DL
PSA SERPL-MCNC: 0.8 NG/ML
RBC # BLD: 3.53 M/UL
RBC # FLD: 16.7 %
SODIUM SERPL-SCNC: 141 MMOL/L
SPECIFIC GRAVITY URINE: 1.01
UROBILINOGEN URINE: NEGATIVE MG/DL
WBC # FLD AUTO: 5.83 K/UL

## 2018-09-06 ENCOUNTER — OTHER (OUTPATIENT)
Age: 78
End: 2018-09-06

## 2018-09-07 ENCOUNTER — LABORATORY RESULT (OUTPATIENT)
Age: 78
End: 2018-09-07

## 2018-09-07 ENCOUNTER — APPOINTMENT (OUTPATIENT)
Dept: HEART AND VASCULAR | Facility: CLINIC | Age: 78
End: 2018-09-07
Payer: MEDICARE

## 2018-09-07 PROCEDURE — 36415 COLL VENOUS BLD VENIPUNCTURE: CPT

## 2018-09-12 LAB
ALBUMIN SERPL ELPH-MCNC: 4.7 G/DL
ALP BLD-CCNC: 89 U/L
ALT SERPL-CCNC: 32 U/L
ANION GAP SERPL CALC-SCNC: 14 MMOL/L
AST SERPL-CCNC: 38 U/L
BILIRUB SERPL-MCNC: 0.6 MG/DL
BUN SERPL-MCNC: 19 MG/DL
CALCIUM SERPL-MCNC: 9.8 MG/DL
CHLORIDE SERPL-SCNC: 103 MMOL/L
CO2 SERPL-SCNC: 27 MMOL/L
CREAT SERPL-MCNC: 0.87 MG/DL
GLUCOSE SERPL-MCNC: 68 MG/DL
POTASSIUM SERPL-SCNC: 4.8 MMOL/L
PROT SERPL-MCNC: 7.3 G/DL
SODIUM SERPL-SCNC: 144 MMOL/L

## 2018-09-20 ENCOUNTER — APPOINTMENT (OUTPATIENT)
Dept: INTERNAL MEDICINE | Facility: CLINIC | Age: 78
End: 2018-09-20
Payer: MEDICARE

## 2018-09-20 VITALS — OXYGEN SATURATION: 100 % | DIASTOLIC BLOOD PRESSURE: 77 MMHG | SYSTOLIC BLOOD PRESSURE: 135 MMHG | HEART RATE: 64 BPM

## 2018-09-20 PROCEDURE — 99213 OFFICE O/P EST LOW 20 MIN: CPT

## 2018-09-20 NOTE — ASSESSMENT
[FreeTextEntry1] : Still with urinary frequency; Will get ultrasound of kidney and pelvis . Further plans after review of study; Will see Urologist soon;  Also Urine culture again\par To Get Shingle vaccine;\par Flu vaccine given; '\par

## 2018-09-20 NOTE — HISTORY OF PRESENT ILLNESS
[FreeTextEntry1] : Still with urinary frequency;  Almost every hour [de-identified] : As above; Had UTI last visit; and Ampicillin given; Not very effective;  Also recent visit at [facility]; Abnormal chest  CT:  No antibiotics given; > Will be getting another CT 3 months; Never got chemo after non small cell lung cancer;\par Will get Holter as per EP: \par To obtain Ultrasound of Kidney and pelvis. Rule out other causes of urinaryfrequency

## 2018-09-21 LAB
APPEARANCE: CLEAR
BILIRUBIN URINE: NEGATIVE
BLOOD URINE: NEGATIVE
COLOR: YELLOW
GLUCOSE QUALITATIVE U: NEGATIVE MG/DL
KETONES URINE: NEGATIVE
LEUKOCYTE ESTERASE URINE: NEGATIVE
NITRITE URINE: NEGATIVE
PH URINE: 6.5
PROTEIN URINE: NEGATIVE MG/DL
SPECIFIC GRAVITY URINE: 1.01
UROBILINOGEN URINE: 1 MG/DL

## 2018-09-23 ENCOUNTER — FORM ENCOUNTER (OUTPATIENT)
Age: 78
End: 2018-09-23

## 2018-09-24 ENCOUNTER — APPOINTMENT (OUTPATIENT)
Dept: ULTRASOUND IMAGING | Facility: HOSPITAL | Age: 78
End: 2018-09-24

## 2018-09-24 ENCOUNTER — OUTPATIENT (OUTPATIENT)
Dept: OUTPATIENT SERVICES | Facility: HOSPITAL | Age: 78
LOS: 1 days | End: 2018-09-24
Payer: MEDICARE

## 2018-09-24 DIAGNOSIS — Z86.69 PERSONAL HISTORY OF OTHER DISEASES OF THE NERVOUS SYSTEM AND SENSE ORGANS: Chronic | ICD-10-CM

## 2018-09-24 DIAGNOSIS — Z98.890 OTHER SPECIFIED POSTPROCEDURAL STATES: Chronic | ICD-10-CM

## 2018-09-24 DIAGNOSIS — Z95.1 PRESENCE OF AORTOCORONARY BYPASS GRAFT: Chronic | ICD-10-CM

## 2018-09-24 PROCEDURE — 76770 US EXAM ABDO BACK WALL COMP: CPT

## 2018-09-24 PROCEDURE — 76770 US EXAM ABDO BACK WALL COMP: CPT | Mod: 26

## 2018-10-03 ENCOUNTER — APPOINTMENT (OUTPATIENT)
Dept: HEART AND VASCULAR | Facility: CLINIC | Age: 78
End: 2018-10-03
Payer: MEDICARE

## 2018-10-03 VITALS
WEIGHT: 145 LBS | HEIGHT: 70 IN | BODY MASS INDEX: 20.76 KG/M2 | DIASTOLIC BLOOD PRESSURE: 65 MMHG | HEART RATE: 66 BPM | SYSTOLIC BLOOD PRESSURE: 127 MMHG

## 2018-10-03 PROCEDURE — 99215 OFFICE O/P EST HI 40 MIN: CPT | Mod: 25

## 2018-10-03 PROCEDURE — 93000 ELECTROCARDIOGRAM COMPLETE: CPT

## 2018-10-05 ENCOUNTER — APPOINTMENT (OUTPATIENT)
Dept: UROLOGY | Facility: CLINIC | Age: 78
End: 2018-10-05
Payer: MEDICARE

## 2018-10-05 VITALS
WEIGHT: 145 LBS | OXYGEN SATURATION: 98 % | TEMPERATURE: 97.9 F | DIASTOLIC BLOOD PRESSURE: 78 MMHG | BODY MASS INDEX: 20.76 KG/M2 | HEIGHT: 70 IN | SYSTOLIC BLOOD PRESSURE: 147 MMHG | HEART RATE: 65 BPM

## 2018-10-05 PROCEDURE — 99204 OFFICE O/P NEW MOD 45 MIN: CPT | Mod: 25

## 2018-10-05 PROCEDURE — 51798 US URINE CAPACITY MEASURE: CPT

## 2018-10-16 ENCOUNTER — APPOINTMENT (OUTPATIENT)
Dept: HEART AND VASCULAR | Facility: CLINIC | Age: 78
End: 2018-10-16
Payer: MEDICARE

## 2018-10-16 VITALS
HEART RATE: 62 BPM | HEIGHT: 70.5 IN | WEIGHT: 150 LBS | OXYGEN SATURATION: 98 % | BODY MASS INDEX: 21.24 KG/M2 | SYSTOLIC BLOOD PRESSURE: 138 MMHG | TEMPERATURE: 97.6 F | DIASTOLIC BLOOD PRESSURE: 68 MMHG

## 2018-10-16 PROCEDURE — ZZZZZ: CPT

## 2018-10-16 RX ORDER — CIPROFLOXACIN HYDROCHLORIDE 500 MG/1
500 TABLET, FILM COATED ORAL
Qty: 20 | Refills: 1 | Status: COMPLETED | COMMUNITY
Start: 2018-09-04 | End: 2018-10-16

## 2018-10-16 RX ORDER — POTASSIUM CHLORIDE 750 MG/1
10 TABLET, EXTENDED RELEASE ORAL TWICE DAILY
Qty: 60 | Refills: 5 | Status: COMPLETED | COMMUNITY
Start: 2018-05-30 | End: 2018-10-16

## 2018-10-16 RX ORDER — AMOXICILLIN AND CLAVULANATE POTASSIUM 500; 125 MG/1; MG/1
500-125 TABLET, FILM COATED ORAL
Qty: 20 | Refills: 1 | Status: COMPLETED | COMMUNITY
Start: 2018-09-04 | End: 2018-10-16

## 2018-10-16 RX ORDER — PANTOPRAZOLE 40 MG/1
40 TABLET, DELAYED RELEASE ORAL DAILY
Qty: 90 | Refills: 3 | Status: COMPLETED | COMMUNITY
End: 2018-10-16

## 2018-10-22 ENCOUNTER — APPOINTMENT (OUTPATIENT)
Dept: INTERNAL MEDICINE | Facility: CLINIC | Age: 78
End: 2018-10-22
Payer: MEDICARE

## 2018-10-22 VITALS — DIASTOLIC BLOOD PRESSURE: 82 MMHG | SYSTOLIC BLOOD PRESSURE: 130 MMHG

## 2018-10-22 PROCEDURE — 36415 COLL VENOUS BLD VENIPUNCTURE: CPT

## 2018-10-22 PROCEDURE — 99213 OFFICE O/P EST LOW 20 MIN: CPT | Mod: 25

## 2018-10-22 NOTE — HISTORY OF PRESENT ILLNESS
[FreeTextEntry1] : Urology follow up noted; Flomax started;  [de-identified] : As above; Urology note read; Flomax started and less nocturia; Still some urinary frequency during day;  Saw Cardilogy and all okay;  Gained some weight;  Appetite good; \par Taking two iron pills a day;

## 2018-10-30 LAB
ALBUMIN SERPL ELPH-MCNC: 3.9 G/DL
ALP BLD-CCNC: 79 U/L
ALT SERPL-CCNC: 30 U/L
ANION GAP SERPL CALC-SCNC: 11 MMOL/L
AST SERPL-CCNC: 35 U/L
BASOPHILS # BLD AUTO: 0.03 K/UL
BASOPHILS NFR BLD AUTO: 0.6 %
BILIRUB SERPL-MCNC: 0.9 MG/DL
BUN SERPL-MCNC: 17 MG/DL
CALCIUM SERPL-MCNC: 9.6 MG/DL
CHLORIDE SERPL-SCNC: 104 MMOL/L
CO2 SERPL-SCNC: 27 MMOL/L
CREAT SERPL-MCNC: 0.87 MG/DL
EOSINOPHIL # BLD AUTO: 0.09 K/UL
EOSINOPHIL NFR BLD AUTO: 1.8 %
GLUCOSE SERPL-MCNC: 97 MG/DL
HCT VFR BLD CALC: 32.3 %
HGB BLD-MCNC: 9.9 G/DL
IMM GRANULOCYTES NFR BLD AUTO: 0.2 %
LYMPHOCYTES # BLD AUTO: 1.08 K/UL
LYMPHOCYTES NFR BLD AUTO: 21 %
MAN DIFF?: NORMAL
MCHC RBC-ENTMCNC: 29.4 PG
MCHC RBC-ENTMCNC: 30.7 GM/DL
MCV RBC AUTO: 95.8 FL
MONOCYTES # BLD AUTO: 0.61 K/UL
MONOCYTES NFR BLD AUTO: 11.9 %
NEUTROPHILS # BLD AUTO: 3.32 K/UL
NEUTROPHILS NFR BLD AUTO: 64.5 %
PLATELET # BLD AUTO: 227 K/UL
POTASSIUM SERPL-SCNC: 4.4 MMOL/L
PROT SERPL-MCNC: 7.2 G/DL
RBC # BLD: 3.37 M/UL
RBC # FLD: 14.3 %
SODIUM SERPL-SCNC: 142 MMOL/L
WBC # FLD AUTO: 5.14 K/UL

## 2018-10-31 ENCOUNTER — APPOINTMENT (OUTPATIENT)
Dept: OPHTHALMOLOGY | Facility: CLINIC | Age: 78
End: 2018-10-31
Payer: MEDICARE

## 2018-10-31 PROCEDURE — 92083 EXTENDED VISUAL FIELD XM: CPT

## 2018-10-31 PROCEDURE — 92133 CPTRZD OPH DX IMG PST SGM ON: CPT

## 2018-10-31 PROCEDURE — 92014 COMPRE OPH EXAM EST PT 1/>: CPT

## 2018-11-20 ENCOUNTER — APPOINTMENT (OUTPATIENT)
Dept: UROLOGY | Facility: CLINIC | Age: 78
End: 2018-11-20
Payer: MEDICARE

## 2018-11-20 VITALS
TEMPERATURE: 97.6 F | HEART RATE: 60 BPM | HEIGHT: 70.5 IN | BODY MASS INDEX: 21.24 KG/M2 | WEIGHT: 150 LBS | DIASTOLIC BLOOD PRESSURE: 77 MMHG | OXYGEN SATURATION: 99 % | SYSTOLIC BLOOD PRESSURE: 129 MMHG

## 2018-11-20 PROCEDURE — 99213 OFFICE O/P EST LOW 20 MIN: CPT | Mod: 25

## 2018-11-20 PROCEDURE — 51798 US URINE CAPACITY MEASURE: CPT

## 2018-11-30 ENCOUNTER — RX RENEWAL (OUTPATIENT)
Age: 78
End: 2018-11-30

## 2018-12-10 ENCOUNTER — RX RENEWAL (OUTPATIENT)
Age: 78
End: 2018-12-10

## 2018-12-18 ENCOUNTER — APPOINTMENT (OUTPATIENT)
Dept: INTERNAL MEDICINE | Facility: CLINIC | Age: 78
End: 2018-12-18
Payer: MEDICARE

## 2018-12-18 VITALS
WEIGHT: 152 LBS | SYSTOLIC BLOOD PRESSURE: 124 MMHG | HEIGHT: 70.5 IN | DIASTOLIC BLOOD PRESSURE: 73 MMHG | BODY MASS INDEX: 21.52 KG/M2 | OXYGEN SATURATION: 100 % | TEMPERATURE: 98 F | HEART RATE: 57 BPM

## 2018-12-18 PROCEDURE — 99213 OFFICE O/P EST LOW 20 MIN: CPT | Mod: 25

## 2018-12-18 PROCEDURE — 36415 COLL VENOUS BLD VENIPUNCTURE: CPT

## 2018-12-18 NOTE — HISTORY OF PRESENT ILLNESS
[FreeTextEntry1] : Balance problems without change [de-identified] : As above; Taking Flomax at night; Decreased nocturia; Able to walk without getting shortness of breath;  Gained some weight; Medication without change;

## 2018-12-18 NOTE — PHYSICAL EXAM

## 2018-12-18 NOTE — ASSESSMENT
[FreeTextEntry1] : Appears stable at this point; No change in current medication; Will repeat CBC and Lipid panel today; Otherwise without change in current regimen

## 2018-12-19 LAB
BASOPHILS # BLD AUTO: 0.02 K/UL
BASOPHILS NFR BLD AUTO: 0.4 %
CHOLEST SERPL-MCNC: 133 MG/DL
CHOLEST/HDLC SERPL: 1.8 RATIO
EOSINOPHIL # BLD AUTO: 0.08 K/UL
EOSINOPHIL NFR BLD AUTO: 1.6 %
HCT VFR BLD CALC: 37.2 %
HDLC SERPL-MCNC: 76 MG/DL
HGB BLD-MCNC: 11.7 G/DL
IMM GRANULOCYTES NFR BLD AUTO: 0 %
LDLC SERPL CALC-MCNC: 52 MG/DL
LYMPHOCYTES # BLD AUTO: 1.15 K/UL
LYMPHOCYTES NFR BLD AUTO: 23.1 %
MAN DIFF?: NORMAL
MCHC RBC-ENTMCNC: 30.2 PG
MCHC RBC-ENTMCNC: 31.5 GM/DL
MCV RBC AUTO: 95.9 FL
MONOCYTES # BLD AUTO: 0.68 K/UL
MONOCYTES NFR BLD AUTO: 13.7 %
NEUTROPHILS # BLD AUTO: 3.04 K/UL
NEUTROPHILS NFR BLD AUTO: 61.2 %
PLATELET # BLD AUTO: 244 K/UL
RBC # BLD: 3.88 M/UL
RBC # FLD: 13.4 %
TRIGL SERPL-MCNC: 26 MG/DL
WBC # FLD AUTO: 4.97 K/UL

## 2019-01-04 ENCOUNTER — APPOINTMENT (OUTPATIENT)
Dept: UROLOGY | Facility: CLINIC | Age: 79
End: 2019-01-04
Payer: MEDICARE

## 2019-01-04 VITALS
BODY MASS INDEX: 21.52 KG/M2 | DIASTOLIC BLOOD PRESSURE: 74 MMHG | SYSTOLIC BLOOD PRESSURE: 121 MMHG | TEMPERATURE: 98.4 F | HEART RATE: 57 BPM | WEIGHT: 152 LBS | HEIGHT: 70.5 IN | OXYGEN SATURATION: 99 %

## 2019-01-04 PROCEDURE — 51798 US URINE CAPACITY MEASURE: CPT

## 2019-01-04 PROCEDURE — 51741 ELECTRO-UROFLOWMETRY FIRST: CPT

## 2019-01-04 PROCEDURE — 99213 OFFICE O/P EST LOW 20 MIN: CPT | Mod: 25

## 2019-02-19 ENCOUNTER — APPOINTMENT (OUTPATIENT)
Dept: INTERNAL MEDICINE | Facility: CLINIC | Age: 79
End: 2019-02-19
Payer: MEDICARE

## 2019-02-19 VITALS
SYSTOLIC BLOOD PRESSURE: 126 MMHG | HEART RATE: 62 BPM | TEMPERATURE: 98.6 F | WEIGHT: 153 LBS | BODY MASS INDEX: 21.66 KG/M2 | DIASTOLIC BLOOD PRESSURE: 77 MMHG | OXYGEN SATURATION: 100 % | HEIGHT: 70.5 IN

## 2019-02-19 DIAGNOSIS — C44.92 SQUAMOUS CELL CARCINOMA OF SKIN, UNSPECIFIED: ICD-10-CM

## 2019-02-19 PROCEDURE — 36415 COLL VENOUS BLD VENIPUNCTURE: CPT

## 2019-02-19 PROCEDURE — 99213 OFFICE O/P EST LOW 20 MIN: CPT | Mod: 25

## 2019-02-19 NOTE — ASSESSMENT
[FreeTextEntry1] : Stable examination; Will repeat all labs; Urinary frequency tolerable at this point

## 2019-02-19 NOTE — HISTORY OF PRESENT ILLNESS
[FreeTextEntry1] : Follow up on various medical problems; BPH. Anemia: HLD:  Urinary frequency [de-identified] : As above; He gets up 2 times a night to urinate; Taking one iron pill a day;

## 2019-02-21 LAB
ALBUMIN SERPL ELPH-MCNC: 4.4 G/DL
ALP BLD-CCNC: 86 U/L
ALT SERPL-CCNC: 29 U/L
ANION GAP SERPL CALC-SCNC: 12 MMOL/L
AST SERPL-CCNC: 39 U/L
BASOPHILS # BLD AUTO: 0.04 K/UL
BASOPHILS NFR BLD AUTO: 0.8 %
BILIRUB SERPL-MCNC: 1.2 MG/DL
BUN SERPL-MCNC: 17 MG/DL
CALCIUM SERPL-MCNC: 10.1 MG/DL
CHLORIDE SERPL-SCNC: 104 MMOL/L
CHOLEST SERPL-MCNC: 136 MG/DL
CHOLEST/HDLC SERPL: 1.9 RATIO
CO2 SERPL-SCNC: 27 MMOL/L
CREAT SERPL-MCNC: 0.9 MG/DL
EOSINOPHIL # BLD AUTO: 0.1 K/UL
EOSINOPHIL NFR BLD AUTO: 2 %
GLUCOSE SERPL-MCNC: 68 MG/DL
HBA1C MFR BLD HPLC: 6 %
HCT VFR BLD CALC: 39.4 %
HDLC SERPL-MCNC: 73 MG/DL
HGB BLD-MCNC: 11.9 G/DL
IMM GRANULOCYTES NFR BLD AUTO: 0.4 %
LDLC SERPL CALC-MCNC: 57 MG/DL
LYMPHOCYTES # BLD AUTO: 1.17 K/UL
LYMPHOCYTES NFR BLD AUTO: 23.5 %
MAN DIFF?: NORMAL
MCHC RBC-ENTMCNC: 30.2 GM/DL
MCHC RBC-ENTMCNC: 30.3 PG
MCV RBC AUTO: 100.3 FL
MONOCYTES # BLD AUTO: 0.62 K/UL
MONOCYTES NFR BLD AUTO: 12.4 %
NEUTROPHILS # BLD AUTO: 3.03 K/UL
NEUTROPHILS NFR BLD AUTO: 60.9 %
PLATELET # BLD AUTO: 218 K/UL
POTASSIUM SERPL-SCNC: 4.8 MMOL/L
PROT SERPL-MCNC: 7.1 G/DL
RBC # BLD: 3.93 M/UL
RBC # FLD: 13.8 %
SODIUM SERPL-SCNC: 143 MMOL/L
TRIGL SERPL-MCNC: 28 MG/DL
WBC # FLD AUTO: 4.98 K/UL

## 2019-03-03 ENCOUNTER — TRANSCRIPTION ENCOUNTER (OUTPATIENT)
Age: 79
End: 2019-03-03

## 2019-03-06 ENCOUNTER — APPOINTMENT (OUTPATIENT)
Dept: OPHTHALMOLOGY | Facility: CLINIC | Age: 79
End: 2019-03-06
Payer: MEDICARE

## 2019-03-06 PROCEDURE — 92083 EXTENDED VISUAL FIELD XM: CPT

## 2019-03-06 PROCEDURE — 92014 COMPRE OPH EXAM EST PT 1/>: CPT

## 2019-03-06 PROCEDURE — 92133 CPTRZD OPH DX IMG PST SGM ON: CPT

## 2019-03-25 ENCOUNTER — RX CHANGE (OUTPATIENT)
Age: 79
End: 2019-03-25

## 2019-04-15 ENCOUNTER — APPOINTMENT (OUTPATIENT)
Dept: HEART AND VASCULAR | Facility: CLINIC | Age: 79
End: 2019-04-15
Payer: MEDICARE

## 2019-04-15 VITALS
WEIGHT: 153 LBS | TEMPERATURE: 97.8 F | HEIGHT: 70.47 IN | OXYGEN SATURATION: 98 % | BODY MASS INDEX: 21.66 KG/M2 | HEART RATE: 56 BPM | DIASTOLIC BLOOD PRESSURE: 58 MMHG | SYSTOLIC BLOOD PRESSURE: 110 MMHG

## 2019-04-15 PROCEDURE — 96161 CAREGIVER HEALTH RISK ASSMT: CPT

## 2019-04-15 PROCEDURE — 93000 ELECTROCARDIOGRAM COMPLETE: CPT | Mod: 59

## 2019-04-15 PROCEDURE — 99214 OFFICE O/P EST MOD 30 MIN: CPT | Mod: 25

## 2019-04-16 NOTE — DISCUSSION/SUMMARY
[FreeTextEntry1] : The number of diagnostic and/or management options \par \par stop amiodarone - ep consult ?EP study \par stop mtolazone, lasix and metoprolol\par amiodarone - cont until EP consult\par hpl - ldl/hdl at goal\par awaiting echo and event monitor\par pcp referral Dr Goode given\par \par \par \par Labs, radiology:\par \par Aspirin therapy:\par \par LDL: n/a\par \par Overall Risk: High Complexity\par \par Administration of PHQ-2/9 for the benefit of the patient\par Score = 0\par Rx = Reassurance provided\par \par Prevention counseling 17min\par Patient was competent and alert at the time of the counseling provided. Will reinforce subsequent visit.\par ·	The patient’s blood pressure goal SBP<130mmHg\par ·	Advised Mediterranean diet discussion, No salt diet - including increased CAD PAD and mortality \par ·	Assessed willingness to attempt - contemplation stage\par ·	Providing methods and skills for prevention - prevention medicine referral, nutritionist\par ·	Medication management - n/a\par ·	Resources provided - prevention medicine referral, nutritionist, cbt therapy, group therapy\par ·	Setting goals - not ready to commit to a date              \par ·	Follow-up arranged - next scheduled visit\par \par

## 2019-04-16 NOTE — PHYSICAL EXAM
[General Appearance - Well Developed] : well developed [Normal Appearance] : normal appearance [No Deformities] : no deformities [Well Groomed] : well groomed [General Appearance - Well Nourished] : well nourished [Normal Jugular Venous A Waves Present] : normal jugular venous A waves present [General Appearance - In No Acute Distress] : no acute distress [No Jugular Venous Chahal A Waves] : no jugular venous chahal A waves [Normal Jugular Venous V Waves Present] : normal jugular venous V waves present [Respiration, Rhythm And Depth] : normal respiratory rhythm and effort [Auscultation Breath Sounds / Voice Sounds] : lungs were clear to auscultation bilaterally [Exaggerated Use Of Accessory Muscles For Inspiration] : no accessory muscle use [Heart Rate And Rhythm] : heart rate and rhythm were normal [Heart Sounds] : normal S1 and S2 [Murmurs] : no murmurs present [Nail Clubbing] : no clubbing of the fingernails [Cyanosis, Localized] : no localized cyanosis [Petechial Hemorrhages (___cm)] : no petechial hemorrhages [] : no ischemic changes

## 2019-04-16 NOTE — HISTORY OF PRESENT ILLNESS
[FreeTextEntry1] : 78 y/o man with hx lung Ca s/p RML resection (several years ago, followed at [facility], in remission as per patient),  s/p recent CABG at [facility] 3/27/18 (LIMA-LAD, \par ALY-RI, SVG-PDA) with uneventful course, since surgery has been with \par progressive dyspnea, generalized weakness and LE edema - had  recent admission \par to [facility] for above with CTA chest negative for PE, echo with EF 60-65%,  treated \par with lasix, discharged on 4/9/18 with Hgb/HCT at that time  9.2/28.6.  On \par 4/10/18 noted to be A fib and was started on xarelto and amiodarone.  On \par 4/26/18  sent from cardiologist office to [facility] for A flutter and worsened LE \par edema and dyspnea, found to be severely anemic with Hgb/HCT 6.2/22.7, VSS \par with /59,. HR 60-70's, RR 18, O2 sat 98% RA,  EKG with A flutter VR 64, \par troponin 0.02, BNP  2,579  and fluid overloaded on exam with mostly LE edema. \par In ED treated with Lasix 40mg IVP x 1 with good UO and started RBC blood \par transfusion ( ordered for two units  with IV lasix in between). Patient \par admitted to  Ur for further work-up. CT Chest w/o to r/o post op bleed (CABG \par 3/2018): No acute thoracic pathology. EP consulted and recommended BRIAN/DCCV \par once patient cleared for Anti-coagulation and CBC remained stable. During \par hospital course patient received total of 3 units pRBC : 2 Units of PRBC \par 4/26/2018 and 1 Unit of PRBC 4/29/2018. GI consulted and patient s/p EGD and \par Colonoscopy (4/30/2018): EGD: Clean base ulcer in duodenal bulb (likely culprit \par of bleed per GI; no intervention performed though as currently healing). \par C-Scope: external Hemorrhoids. Patient treated with Protonix 40 mg IV BID since \par admission with plan to transition to PO for 6-8 weeks on discharge. Hgb/HCT \par remained stable and patient resumed Anticoagulation Xarelto 20 mg PO Daily on \par 5/1/18 after being cleared by GI to do so. Patient observed on AC for 24 hrs to \par ensure no recurrence of bleed. CBC remained stable and patient underwent BRIAN \par 5/3 which revealed no clot in LA or EDWINA, Left atrial appendage emptying \par velocities mildly are reduced, normal wall motion and LV function. Patient s/p \par DCCV now in NSR and Toprol XL reduced to 25 mg PO Daily. Patient will likely \par have ablation in future. Hospital course also significant for Acute Diastolic \par CHF for which patient diuresed with Lasix IV.-CT chest (4/27/2018): No \par effusion. -Echo (4/27/2018): mild AV thickening, mild LV thickening, mild MAC, \par trace MR, trace TR, LVEF 55-60%. No pericardial effusion. Lower extremity \par venous Duplex 04/27 no DVT seen. Hospital course also significant for \par Transaminitis for which Lipitor was held x 72 hours and LFTs normalized. \par Limited RUQ ultrasound 4/30: Cholelithiasis. No biliary dilatation. \par Leukocytosis present on admission now resolved. CXR and CT Chest with no \par infiltrate. -UCX negative. Physical Therapy evaluated and recommends home with \par home PT.\par \par location: chest\par duration: interm\par  modifying factors: rest\par timing: seconds\par severity: 8/10\par \par feels very dehydrated no edema touble sleeping and dizzinewss\par EKG marked sinus ruth 48 bpm on amio and bb lasix metolazone\par \par despite removal of BB and anti HTN pt remains fatigued, ekg sinus ruth 50 qrd 124ms remains on amio\par \par 4/15/19 USOH, 100% compliant with meds\par location: chest\par duration: na\par  modifying factors: na\par timing: na\par severity: 0/10\par EKG unchanged from prior (in chart)\par consultation notes reviewed where appropriate\par  \par \par

## 2019-04-22 ENCOUNTER — RX RENEWAL (OUTPATIENT)
Age: 79
End: 2019-04-22

## 2019-04-22 RX ORDER — TRAVOPROST 0.04 MG/ML
0 SOLUTION/ DROPS OPHTHALMIC
Qty: 1 | Refills: 3 | Status: ACTIVE | COMMUNITY
Start: 2019-04-22 | End: 1900-01-01

## 2019-05-07 ENCOUNTER — APPOINTMENT (OUTPATIENT)
Dept: INTERNAL MEDICINE | Facility: CLINIC | Age: 79
End: 2019-05-07
Payer: MEDICARE

## 2019-05-07 PROCEDURE — 36415 COLL VENOUS BLD VENIPUNCTURE: CPT

## 2019-05-09 LAB
MEV IGG FLD QL IA: >300 AU/ML
MEV IGG+IGM SER-IMP: POSITIVE

## 2019-05-17 ENCOUNTER — MEDICATION RENEWAL (OUTPATIENT)
Age: 79
End: 2019-05-17

## 2019-05-21 ENCOUNTER — APPOINTMENT (OUTPATIENT)
Dept: INTERNAL MEDICINE | Facility: CLINIC | Age: 79
End: 2019-05-21
Payer: MEDICARE

## 2019-05-21 VITALS
OXYGEN SATURATION: 99 % | DIASTOLIC BLOOD PRESSURE: 73 MMHG | BODY MASS INDEX: 21.47 KG/M2 | HEART RATE: 58 BPM | SYSTOLIC BLOOD PRESSURE: 116 MMHG | TEMPERATURE: 98.7 F | HEIGHT: 70 IN | WEIGHT: 150 LBS

## 2019-05-21 DIAGNOSIS — R63.4 ABNORMAL WEIGHT LOSS: ICD-10-CM

## 2019-05-21 PROCEDURE — 99213 OFFICE O/P EST LOW 20 MIN: CPT | Mod: 25

## 2019-05-21 PROCEDURE — 36415 COLL VENOUS BLD VENIPUNCTURE: CPT

## 2019-05-21 NOTE — HEALTH RISK ASSESSMENT
[No falls in past year] : Patient reported no falls in the past year [0] : 2) Feeling down, depressed, or hopeless: Not at all (0) [] : No [[id number]] : 0

## 2019-05-21 NOTE — ASSESSMENT
[FreeTextEntry1] : Patient looks well but the weight loss is concerning him; Will repeat labs today and also thyroid function and ferritin level .  Will increase iron to twice a day;  Will make further recommendation after review of labs

## 2019-05-21 NOTE — HISTORY OF PRESENT ILLNESS
[FreeTextEntry1] : Feels well but lost three pounds;  [de-identified] : In addition to above no respiratory problems;  Going to [facility] every 56 months;  Eating well;  Taking one iron pill a day;  Would increase dose to 2 day;

## 2019-05-23 LAB
25(OH)D3 SERPL-MCNC: 41.4 NG/ML
ALBUMIN SERPL ELPH-MCNC: 4.3 G/DL
ALP BLD-CCNC: 76 U/L
ALT SERPL-CCNC: 24 U/L
ANION GAP SERPL CALC-SCNC: 11 MMOL/L
AST SERPL-CCNC: 27 U/L
BASOPHILS # BLD AUTO: 0.04 K/UL
BASOPHILS NFR BLD AUTO: 0.9 %
BILIRUB SERPL-MCNC: 1.1 MG/DL
BUN SERPL-MCNC: 16 MG/DL
CALCIUM SERPL-MCNC: 9.7 MG/DL
CHLORIDE SERPL-SCNC: 104 MMOL/L
CO2 SERPL-SCNC: 28 MMOL/L
CREAT SERPL-MCNC: 0.89 MG/DL
EOSINOPHIL # BLD AUTO: 0.1 K/UL
EOSINOPHIL NFR BLD AUTO: 2.3 %
FERRITIN SERPL-MCNC: 121 NG/ML
GLUCOSE SERPL-MCNC: 72 MG/DL
HCT VFR BLD CALC: 37.4 %
HGB BLD-MCNC: 11.6 G/DL
IMM GRANULOCYTES NFR BLD AUTO: 0.2 %
LYMPHOCYTES # BLD AUTO: 0.99 K/UL
LYMPHOCYTES NFR BLD AUTO: 23.2 %
MAN DIFF?: NORMAL
MCHC RBC-ENTMCNC: 30.4 PG
MCHC RBC-ENTMCNC: 31 GM/DL
MCV RBC AUTO: 98.2 FL
MONOCYTES # BLD AUTO: 0.5 K/UL
MONOCYTES NFR BLD AUTO: 11.7 %
NEUTROPHILS # BLD AUTO: 2.63 K/UL
NEUTROPHILS NFR BLD AUTO: 61.7 %
PLATELET # BLD AUTO: 218 K/UL
POTASSIUM SERPL-SCNC: 4.6 MMOL/L
PROT SERPL-MCNC: 6.9 G/DL
RBC # BLD: 3.81 M/UL
RBC # FLD: 13.4 %
SODIUM SERPL-SCNC: 143 MMOL/L
T4 FREE SERPL-MCNC: 1.3 NG/DL
TSH SERPL-ACNC: 2.68 UIU/ML
WBC # FLD AUTO: 4.27 K/UL

## 2019-06-13 ENCOUNTER — APPOINTMENT (OUTPATIENT)
Dept: INTERNAL MEDICINE | Facility: CLINIC | Age: 79
End: 2019-06-13
Payer: MEDICARE

## 2019-06-13 VITALS
BODY MASS INDEX: 29.45 KG/M2 | TEMPERATURE: 98.5 F | WEIGHT: 150 LBS | OXYGEN SATURATION: 98 % | HEIGHT: 60 IN | HEART RATE: 59 BPM | DIASTOLIC BLOOD PRESSURE: 75 MMHG | SYSTOLIC BLOOD PRESSURE: 129 MMHG

## 2019-06-13 PROCEDURE — 99213 OFFICE O/P EST LOW 20 MIN: CPT | Mod: 25

## 2019-06-13 PROCEDURE — 36415 COLL VENOUS BLD VENIPUNCTURE: CPT

## 2019-06-13 NOTE — PHYSICAL EXAM
[No Acute Distress] : no acute distress [Well Nourished] : well nourished [Well-Appearing] : well-appearing [Well Developed] : well developed [PERRL] : pupils equal round and reactive to light [Normal Sclera/Conjunctiva] : normal sclera/conjunctiva [Normal Outer Ear/Nose] : the outer ears and nose were normal in appearance [EOMI] : extraocular movements intact [No JVD] : no jugular venous distention [Normal Oropharynx] : the oropharynx was normal [Supple] : supple [No Lymphadenopathy] : no lymphadenopathy [Thyroid Normal, No Nodules] : the thyroid was normal and there were no nodules present [No Respiratory Distress] : no respiratory distress  [No Accessory Muscle Use] : no accessory muscle use [Clear to Auscultation] : lungs were clear to auscultation bilaterally [Normal Rate] : normal rate  [Regular Rhythm] : with a regular rhythm [Normal S1, S2] : normal S1 and S2 [No Carotid Bruits] : no carotid bruits [No Murmur] : no murmur heard [No Abdominal Bruit] : a ~M bruit was not heard ~T in the abdomen [No Varicosities] : no varicosities [Pedal Pulses Present] : the pedal pulses are present [No Edema] : there was no peripheral edema [No Extremity Clubbing/Cyanosis] : no extremity clubbing/cyanosis [No Palpable Aorta] : no palpable aorta [Soft] : abdomen soft [Non Tender] : non-tender [Non-distended] : non-distended [No Masses] : no abdominal mass palpated [No HSM] : no HSM [Normal Bowel Sounds] : normal bowel sounds [Normal Posterior Cervical Nodes] : no posterior cervical lymphadenopathy [Normal Anterior Cervical Nodes] : no anterior cervical lymphadenopathy [No CVA Tenderness] : no CVA  tenderness [No Spinal Tenderness] : no spinal tenderness [No Joint Swelling] : no joint swelling [Grossly Normal Strength/Tone] : grossly normal strength/tone [No Rash] : no rash [Normal Gait] : normal gait [Coordination Grossly Intact] : coordination grossly intact [No Focal Deficits] : no focal deficits [Deep Tendon Reflexes (DTR)] : deep tendon reflexes were 2+ and symmetric [Normal Affect] : the affect was normal [Normal Insight/Judgement] : insight and judgment were intact

## 2019-06-13 NOTE — HISTORY OF PRESENT ILLNESS
[FreeTextEntry1] : Recent trip to [place]; Got URI there [de-identified] : In addition secondary to above fatigue and also walking with loss of balance;  Respiratory status is okay; Has coughing;  No fever;  Saw a MD in [place] and antibiotic given and cough medication given also; Antibiotic was Augmentin and had poor reaction to medication\par Black stool as well but could be secondary to iron

## 2019-06-14 LAB
ALBUMIN SERPL ELPH-MCNC: 4.4 G/DL
ALP BLD-CCNC: 91 U/L
ALT SERPL-CCNC: 20 U/L
ANION GAP SERPL CALC-SCNC: 12 MMOL/L
AST SERPL-CCNC: 22 U/L
BASOPHILS # BLD AUTO: 0.03 K/UL
BASOPHILS NFR BLD AUTO: 0.4 %
BILIRUB SERPL-MCNC: 0.8 MG/DL
BUN SERPL-MCNC: 17 MG/DL
CALCIUM SERPL-MCNC: 9.9 MG/DL
CHLORIDE SERPL-SCNC: 102 MMOL/L
CO2 SERPL-SCNC: 28 MMOL/L
CREAT SERPL-MCNC: 0.88 MG/DL
EOSINOPHIL # BLD AUTO: 0.12 K/UL
EOSINOPHIL NFR BLD AUTO: 1.6 %
GLUCOSE SERPL-MCNC: 99 MG/DL
HCT VFR BLD CALC: 40.2 %
HGB BLD-MCNC: 12.7 G/DL
IMM GRANULOCYTES NFR BLD AUTO: 0.3 %
LYMPHOCYTES # BLD AUTO: 1.17 K/UL
LYMPHOCYTES NFR BLD AUTO: 15.3 %
MAN DIFF?: NORMAL
MCHC RBC-ENTMCNC: 30.5 PG
MCHC RBC-ENTMCNC: 31.6 GM/DL
MCV RBC AUTO: 96.4 FL
MONOCYTES # BLD AUTO: 0.7 K/UL
MONOCYTES NFR BLD AUTO: 9.2 %
NEUTROPHILS # BLD AUTO: 5.61 K/UL
NEUTROPHILS NFR BLD AUTO: 73.2 %
PLATELET # BLD AUTO: 325 K/UL
POTASSIUM SERPL-SCNC: 4.5 MMOL/L
PROT SERPL-MCNC: 7.1 G/DL
RBC # BLD: 4.17 M/UL
RBC # FLD: 13.2 %
SODIUM SERPL-SCNC: 142 MMOL/L
WBC # FLD AUTO: 7.65 K/UL

## 2019-06-17 RX ORDER — TAMSULOSIN HYDROCHLORIDE 0.4 MG/1
0.4 CAPSULE ORAL
Qty: 180 | Refills: 3 | Status: DISCONTINUED | COMMUNITY
Start: 2018-10-05 | End: 2019-06-17

## 2019-07-02 ENCOUNTER — APPOINTMENT (OUTPATIENT)
Dept: UROLOGY | Facility: CLINIC | Age: 79
End: 2019-07-02
Payer: MEDICARE

## 2019-07-02 VITALS — SYSTOLIC BLOOD PRESSURE: 107 MMHG | HEART RATE: 58 BPM | TEMPERATURE: 97.4 F | DIASTOLIC BLOOD PRESSURE: 65 MMHG

## 2019-07-02 PROCEDURE — 99213 OFFICE O/P EST LOW 20 MIN: CPT

## 2019-07-02 NOTE — ASSESSMENT
[FreeTextEntry1] : BPH\par will hold off on anythging procedural until neurologic evaluation is complete\par cont alfuzosin\par PVR 0

## 2019-07-02 NOTE — HISTORY OF PRESENT ILLNESS
[FreeTextEntry1] : now stopped tamsulosin due to ?orthostasis (he had been on medications many months)\par now on alfuzosin\par reports slightly less effective on medication but generally healthy\par may be having myasthenia gravis flare up\par feels well overall\par mild bother\par undergoing full neurologic workup

## 2019-07-09 ENCOUNTER — APPOINTMENT (OUTPATIENT)
Dept: OPHTHALMOLOGY | Facility: CLINIC | Age: 79
End: 2019-07-09
Payer: MEDICARE

## 2019-07-09 ENCOUNTER — NON-APPOINTMENT (OUTPATIENT)
Age: 79
End: 2019-07-09

## 2019-07-09 PROCEDURE — 92014 COMPRE OPH EXAM EST PT 1/>: CPT

## 2019-07-11 ENCOUNTER — INBOUND DOCUMENT (OUTPATIENT)
Age: 79
End: 2019-07-11

## 2019-07-30 ENCOUNTER — APPOINTMENT (OUTPATIENT)
Dept: INTERNAL MEDICINE | Facility: CLINIC | Age: 79
End: 2019-07-30
Payer: MEDICARE

## 2019-07-30 VITALS
DIASTOLIC BLOOD PRESSURE: 75 MMHG | WEIGHT: 151 LBS | TEMPERATURE: 98.5 F | OXYGEN SATURATION: 100 % | HEART RATE: 51 BPM | HEIGHT: 70 IN | BODY MASS INDEX: 21.62 KG/M2 | SYSTOLIC BLOOD PRESSURE: 122 MMHG

## 2019-07-30 PROCEDURE — 99213 OFFICE O/P EST LOW 20 MIN: CPT

## 2019-07-30 NOTE — HISTORY OF PRESENT ILLNESS
[FreeTextEntry1] : Recent follow up with Neurology; Myasthenia  ; Lung capacity down; Will see a pulmonary MD at [facility];  [de-identified] : As above feels okay.  Respiratory  status appears stable;  Still coughing; Dry cough;  Could be related to MG. Tried Mestinon with no real change in status; \par Appetite good; Eating okay;

## 2019-07-30 NOTE — ASSESSMENT
[FreeTextEntry1] : Looks quite stable; CT said to abnormal; No specific treatment for now;  Will follow up at pulmonary [facility] next month\par \par

## 2019-07-30 NOTE — HEALTH RISK ASSESSMENT
[] : No [No] : No [No falls in past year] : Patient reported no falls in the past year [0] : 2) Feeling down, depressed, or hopeless: Not at all (0) [[id number]] : 0

## 2019-07-30 NOTE — PHYSICAL EXAM
[No Acute Distress] : no acute distress [Well Nourished] : well nourished [Well Developed] : well developed [Well-Appearing] : well-appearing [Normal Sclera/Conjunctiva] : normal sclera/conjunctiva [PERRL] : pupils equal round and reactive to light [Normal Oropharynx] : the oropharynx was normal [EOMI] : extraocular movements intact [Normal Outer Ear/Nose] : the outer ears and nose were normal in appearance [No JVD] : no jugular venous distention [No Lymphadenopathy] : no lymphadenopathy [Supple] : supple [No Accessory Muscle Use] : no accessory muscle use [No Respiratory Distress] : no respiratory distress  [Thyroid Normal, No Nodules] : the thyroid was normal and there were no nodules present [Regular Rhythm] : with a regular rhythm [Normal Rate] : normal rate  [Clear to Auscultation] : lungs were clear to auscultation bilaterally [Normal S1, S2] : normal S1 and S2 [No Murmur] : no murmur heard [No Carotid Bruits] : no carotid bruits [No Abdominal Bruit] : a ~M bruit was not heard ~T in the abdomen [No Varicosities] : no varicosities [No Edema] : there was no peripheral edema [No Palpable Aorta] : no palpable aorta [Pedal Pulses Present] : the pedal pulses are present [Soft] : abdomen soft [No Extremity Clubbing/Cyanosis] : no extremity clubbing/cyanosis [Non Tender] : non-tender [Non-distended] : non-distended [No Masses] : no abdominal mass palpated [Normal Bowel Sounds] : normal bowel sounds [Normal Posterior Cervical Nodes] : no posterior cervical lymphadenopathy [No HSM] : no HSM [No Spinal Tenderness] : no spinal tenderness [Normal Anterior Cervical Nodes] : no anterior cervical lymphadenopathy [No CVA Tenderness] : no CVA  tenderness [No Joint Swelling] : no joint swelling [Grossly Normal Strength/Tone] : grossly normal strength/tone [Coordination Grossly Intact] : coordination grossly intact [No Focal Deficits] : no focal deficits [No Rash] : no rash [Normal Gait] : normal gait [Deep Tendon Reflexes (DTR)] : deep tendon reflexes were 2+ and symmetric [Normal Affect] : the affect was normal [Normal Insight/Judgement] : insight and judgment were intact

## 2019-08-02 ENCOUNTER — INBOUND DOCUMENT (OUTPATIENT)
Age: 79
End: 2019-08-02

## 2019-09-01 ENCOUNTER — INBOUND DOCUMENT (OUTPATIENT)
Age: 79
End: 2019-09-01

## 2019-10-01 ENCOUNTER — APPOINTMENT (OUTPATIENT)
Dept: INTERNAL MEDICINE | Facility: CLINIC | Age: 79
End: 2019-10-01
Payer: MEDICARE

## 2019-10-01 VITALS
WEIGHT: 155 LBS | SYSTOLIC BLOOD PRESSURE: 113 MMHG | BODY MASS INDEX: 22.19 KG/M2 | HEIGHT: 70 IN | TEMPERATURE: 97.6 F | OXYGEN SATURATION: 100 % | HEART RATE: 50 BPM | DIASTOLIC BLOOD PRESSURE: 68 MMHG

## 2019-10-01 PROCEDURE — 99213 OFFICE O/P EST LOW 20 MIN: CPT

## 2019-10-01 NOTE — HEALTH RISK ASSESSMENT
[No] : No [No falls in past year] : Patient reported no falls in the past year [0] : 2) Feeling down, depressed, or hopeless: Not at all (0) [] : No [[id number]] : 0

## 2019-10-01 NOTE — HISTORY OF PRESENT ILLNESS
[FreeTextEntry1] : All studies noted;  Left diaphragm elelvated; \par Respiratory status stable;  [de-identified] : Otherwise without change in status;  Urinary frequency improved;

## 2019-10-23 ENCOUNTER — APPOINTMENT (OUTPATIENT)
Dept: HEART AND VASCULAR | Facility: CLINIC | Age: 79
End: 2019-10-23

## 2019-10-24 ENCOUNTER — APPOINTMENT (OUTPATIENT)
Dept: HEART AND VASCULAR | Facility: CLINIC | Age: 79
End: 2019-10-24
Payer: MEDICARE

## 2019-10-24 VITALS
OXYGEN SATURATION: 98 % | DIASTOLIC BLOOD PRESSURE: 66 MMHG | HEART RATE: 50 BPM | HEIGHT: 70 IN | TEMPERATURE: 97.6 F | BODY MASS INDEX: 22.48 KG/M2 | WEIGHT: 156.99 LBS | SYSTOLIC BLOOD PRESSURE: 120 MMHG

## 2019-10-24 PROCEDURE — 99214 OFFICE O/P EST MOD 30 MIN: CPT

## 2019-10-24 PROCEDURE — 93000 ELECTROCARDIOGRAM COMPLETE: CPT

## 2019-10-24 NOTE — PHYSICAL EXAM
[General Appearance - Well Developed] : well developed [Normal Appearance] : normal appearance [Well Groomed] : well groomed [General Appearance - Well Nourished] : well nourished [General Appearance - In No Acute Distress] : no acute distress [No Deformities] : no deformities [Normal Jugular Venous A Waves Present] : normal jugular venous A waves present [Normal Jugular Venous V Waves Present] : normal jugular venous V waves present [No Jugular Venous Chahal A Waves] : no jugular venous chahal A waves [Respiration, Rhythm And Depth] : normal respiratory rhythm and effort [Exaggerated Use Of Accessory Muscles For Inspiration] : no accessory muscle use [Auscultation Breath Sounds / Voice Sounds] : lungs were clear to auscultation bilaterally [Heart Rate And Rhythm] : heart rate and rhythm were normal [Heart Sounds] : normal S1 and S2 [Murmurs] : no murmurs present [Nail Clubbing] : no clubbing of the fingernails [] : no ischemic changes [Cyanosis, Localized] : no localized cyanosis [Petechial Hemorrhages (___cm)] : no petechial hemorrhages

## 2019-10-24 NOTE — DISCUSSION/SUMMARY
[FreeTextEntry1] : The number of diagnostic and/or management options include:\par \par PAF - on AC inherent bradycardai , mild dizziness poor chronotropy manuel place 14d monitor to evaluate\par \par HTN - the patient BP is at goal, ambi bp monitor at goal, no jvd/rale/edema on exam, continue current medicines\par \par \par CAD - the patient is not in active chest pain, no new sob or oliver, ekg is unchanged and stable. on guideline directed medical therapy. stop amiodarone - ep consult ?EP study \par stop mtolazone, lasix and metoprolol\par amiodarone - cont until EP consult\par \par hpl - ldl/hdl at goal\par \par \par \par CV Prevention - \par ·	Advised SBP guidelines based on ACC/AHA and SPRINT trial increased CAD PAD and mortality \par ·	Assessed willingness to attempt - contemplation stage\par ·	Agree to no added salt and added sugar diet  - prevention medicine referral, nutritionist\par ·	Assist with resources provided - prevention medicine referral, nutritionist, individual counseling\par ·	Arrange ·	Follow-up arranged - next scheduled visit\par \par \par Labs, radiology: EKG\par \par \par \par \par

## 2019-10-24 NOTE — HISTORY OF PRESENT ILLNESS
[FreeTextEntry1] : 78 y/o man with hx lung Ca s/p RML resection (several years ago, followed at [facility], in [facility] as per patient),  s/p recent CABG at [facility] 3/27/18 (LIMA-LAD, \par ALY-RI, SVG-PDA) with uneventful course, since surgery has been with \par progressive dyspnea, generalized weakness and LE edema - had  recent admission \par to [facility] for above with CTA chest negative for PE, echo with EF 60-65%,  treated \par with lasix, discharged on 4/9/18 with Hgb/HCT at that time  9.2/28.6.  On \par 4/10/18 noted to be A fib and was started on xarelto and amiodarone.  On \par 4/26/18  sent from cardiologist office to [facility] for A flutter and worsened LE \par edema and dyspnea, found to be severely anemic with Hgb/HCT 6.2/22.7, VSS \par with /59,. HR 60-70's, RR 18, O2 sat 98% RA,  EKG with A flutter VR 64, \par troponin 0.02, BNP  2,579  and fluid overloaded on exam with mostly LE edema. \par In ED treated with Lasix 40mg IVP x 1 with good UO and started RBC blood \par transfusion ( ordered for two units  with IV lasix in between). Patient \par admitted to  Ur for further work-up. CT Chest w/o to r/o post op bleed (CABG \par 3/2018): No acute thoracic pathology. EP consulted and recommended BRIAN/DCCV \par once patient cleared for Anti-coagulation and CBC remained stable. During \par hospital course patient received total of 3 units pRBC : 2 Units of PRBC \par 4/26/2018 and 1 Unit of PRBC 4/29/2018. GI consulted and patient s/p EGD and \par Colonoscopy (4/30/2018): EGD: Clean base ulcer in duodenal bulb (likely culprit \par of bleed per GI; no intervention performed though as currently healing). \par C-Scope: external Hemorrhoids. Patient treated with Protonix 40 mg IV BID since \par admission with plan to transition to PO for 6-8 weeks on discharge. Hgb/HCT \par remained stable and patient resumed Anticoagulation Xarelto 20 mg PO Daily on \par 5/1/18 after being cleared by GI to do so. Patient observed on AC for 24 hrs to \par ensure no recurrence of bleed. CBC remained stable and patient underwent BRIAN \par 5/3 which revealed no clot in LA or EDWINA, Left atrial appendage emptying \par velocities mildly are reduced, normal wall motion and LV function. Patient s/p \par DCCV now in NSR and Toprol XL reduced to 25 mg PO Daily. Patient will likely \par have ablation in future. Hospital course also significant for Acute Diastolic \par CHF for which patient diuresed with Lasix IV.-CT chest (4/27/2018): No \par effusion. -Echo (4/27/2018): mild AV thickening, mild LV thickening, mild MAC, \par trace MR, trace TR, LVEF 55-60%. No pericardial effusion. Lower extremity \par venous Duplex 04/27 no DVT seen. Hospital course also significant for \par Transaminitis for which Lipitor was held x 72 hours and LFTs normalized. \par Limited RUQ ultrasound 4/30: Cholelithiasis. No biliary dilatation. \par Leukocytosis present on admission now resolved. CXR and CT Chest with no \par infiltrate. -UCX negative. Physical Therapy evaluated and recommends home with \par home PT.\par \par location: chest\par duration: interm\par  modifying factors: rest\par timing: seconds\par severity: 8/10\par \par feels very dehydrated no edema touble sleeping and dizzinewss\par EKG marked sinus ruth 48 bpm on amio and bb lasix metolazone\par \par despite removal of BB and anti HTN pt remains fatigued, ekg sinus ruth 50 qrd 124ms remains on amio\par \par 4/15/19 USOH, 100% compliant with meds\par location: chest\par duration: na\par  modifying factors: na\par timing: na\par severity: 0/10\par EKG unchanged from prior (in chart)\par consultation notes reviewed where appropriate\par \par 10/24/19 Interval Symptoms: usual state of health, 100% compliant with meds\par location: chest\par duration: none\par  modifying factors: none\par timing: none\par severity: 0/10\par EKG unchanged from prior (in chart)\par consultation notes reviewed where appropriate\par NYHA Functional Class: I\par Home monitoring: Blood Pressure\par Interval Triggers: None. \par Medications: the patient is adherent with his medication regimen. denies medication side effects. \par Disease Management: the patient is doing well with goals. \par \par  \par  \par \par

## 2019-11-07 ENCOUNTER — RX RENEWAL (OUTPATIENT)
Age: 79
End: 2019-11-07

## 2019-11-08 ENCOUNTER — RX RENEWAL (OUTPATIENT)
Age: 79
End: 2019-11-08

## 2019-11-14 NOTE — ASSESSMENT
[FreeTextEntry1] : Looks weak but otherwise has normal examination;  Will obtain all labs to make sure patient is not bleeding; Continue present medication for now;  no radiation

## 2019-11-27 ENCOUNTER — RX CHANGE (OUTPATIENT)
Age: 79
End: 2019-11-27

## 2019-12-02 ENCOUNTER — RX CHANGE (OUTPATIENT)
Age: 79
End: 2019-12-02

## 2019-12-30 NOTE — CONSULT NOTE ADULT - SUBJECTIVE AND OBJECTIVE BOX
56 CHIEF COMPLAINT: atrial flutter     HISTORY OF PRESENT ILLNESS: 76 yo M with history of CAD, myasthenia gravis, history of atrial fibrillation on Xarelto and Amiodarone, s/p CABG at [facility] 3/27/18 ( LIMA> LAD, ALY>RI, SVG>PDA), resent admission on 4/10/18 for SOB, he was noted to be in atrial fibrillation and started on Xarelto and amiodarone , on 4/26/18 he  was sent to ED form his PMD office with newly diagnosed atrial flutter. Patient states that for the past 3 week he has been experiencing SOB, fatigue and decreased exercise tolerance.  Patient states that he has been taking his medication Xarelto and amiodarone without any issues.   In Ed patient was noted to be anaemic with Hgb 6.8, he was transfused 2 U PRBC in the ED and admitted for further work up. EPS evaluation for atrial flutter.      PAST MEDICAL & SURGICAL HISTORY:  Detached retina, right  History of lung cancer  CAD (coronary artery disease)  Atrial fibrillation and flutter  Myasthenia gravis  History of lung surgery  H/O cataract  S/P CABG x 3        PERTINENT DIAGNOSTIC TESTING:    [ ] Echocardiogram: PND      Allergies    No Known Allergies    	    MEDICATIONS:  amiodarone    Tablet 200 milliGRAM(s) Oral daily  furosemide   Injectable 40 milliGRAM(s) IV Push daily  metoprolol succinate ER 50 milliGRAM(s) Oral daily  pantoprazole  Injectable 40 milliGRAM(s) IV Push two times a day  atorvastatin 80 milliGRAM(s) Oral at bedtime  cholecalciferol 1000 Unit(s) Oral daily  cyanocobalamin 1000 MICROGram(s) Oral daily  latanoprost 0.005% Ophthalmic Solution 1 Drop(s) Both EYES at bedtime  potassium chloride    Tablet ER 40 milliEquivalent(s) Oral once  timolol 0.5% Solution 1 Drop(s) Both EYES two times a day      FAMILY HISTORY:      SOCIAL HISTORY:    [x ] Non-smoker          REVIEW OF SYSTEMS:    CONSTITUTIONAL: No fever, weight loss,  + fatigue  EYES: No eye pain, visual disturbances, or discharge  ENMT:  No difficulty hearing, tinnitus, vertigo; No sinus or throat pain  NECK: No pain or stiffness  RESPIRATORY: No cough, wheezing, chills or hemoptysis; + Shortness of Breath  CARDIOVASCULAR: No chest pain, palpitations, dizziness, + leg swelling  GASTROINTESTINAL: No abdominal or epigastric pain. No nausea, vomiting, or hematemesis; No diarrhea or constipation.   GENITOURINARY: No dysuria, frequency, hematuria, or incontinence  NEUROLOGICAL: No headaches, memory loss, loss of strength, numbness, or tremors  SKIN: No itching, burning, rashes, or lesions   LYMPH Nodes: No enlarged glands  ENDOCRINE: No heat or cold intolerance; No hair loss  MUSCULOSKELETAL: No joint pain or swelling; No muscle, back, or extremity pain  PSYCHIATRIC: No depression, anxiety, mood swings, or difficulty sleeping  HEME/LYMPH: No easy bruising, or bleeding gums  ALLERY AND IMMUNOLOGIC: No hives or eczema	    PHYSICAL EXAM:  T(C): 36.7 (04-27-18 @ 05:30), Max: 36.7 (04-27-18 @ 05:30)  HR: 74 (04-27-18 @ 05:17) (55 - 74)  BP: 146/72 (04-27-18 @ 05:17) (120/56 - 146/72)  RR: 20 (04-27-18 @ 05:17) (18 - 22)  SpO2: 100% (04-27-18 @ 05:17) (97% - 100%)  Wt(kg): --  I&O's Summary    26 Apr 2018 07:01  -  27 Apr 2018 07:00  --------------------------------------------------------  IN: 250 mL / OUT: 1865 mL / NET: -1615 mL    27 Apr 2018 07:01  -  27 Apr 2018 09:36  --------------------------------------------------------  IN: 0 mL / OUT: 800 mL / NET: -800 mL        TELEMETRY:  atrial flutter    ECG: atrial flutter     HEENT:    PERRL, EOMI	  Cardiovascular:S1 S2, , +2 edema bl  Respiratory: BS decreased bl  Gastrointestinal:  Soft, Non-tender, + BS	  Neurologic: A&O x 3  Extremities: +2 edema bl    	  LABS:	 	    CARDIAC MARKERS:                           9.0    13.0  )-----------( 289      ( 27 Apr 2018 06:41 )             28.4     04-27    139  |  99  |  28<H>  ----------------------------<  109<H>  3.6   |  29  |  1.05    Ca    8.8      27 Apr 2018 06:41  Mg     2.2     04-27    TPro  6.0  /  Alb  3.6  /  TBili  0.7  /  DBili  x   /  AST  49<H>  /  ALT  77<H>  /  AlkPhos  171<H>  04-26    proBNP: Serum Pro-Brain Natriuretic Peptide: 2579 pg/mL (04-26 @ 15:34)    Lipid Profile:   HgA1c:   TSH:     ASSESSMENT/PLAN: 	  76 yo M with history of CAD, myasthenia gravis, history of atrial fibrillation on Xarelto and Amiodarone, s/p CABG at [facility] 3/27/18 ( LIMA> LAD, ALY>RI, SVG>PDA), resent admission on 4/10/18 for SOB,  on 4/26/18 he  was sent to ED form his PMD office with newly diagnosed atrial flutter.   Patient is now undergoing anemia work up , s/p 2U PRBC, GI evaluation pending, anticoagulation is on hold  Will follow up echo report, GI work up, when patient is cleared for anticoagulation will plan forTEE/DCCV before discharge.

## 2019-12-31 ENCOUNTER — APPOINTMENT (OUTPATIENT)
Dept: OTOLARYNGOLOGY | Facility: CLINIC | Age: 79
End: 2019-12-31
Payer: MEDICARE

## 2019-12-31 VITALS
HEIGHT: 71 IN | WEIGHT: 155 LBS | SYSTOLIC BLOOD PRESSURE: 121 MMHG | HEART RATE: 58 BPM | BODY MASS INDEX: 21.7 KG/M2 | DIASTOLIC BLOOD PRESSURE: 76 MMHG

## 2019-12-31 DIAGNOSIS — H93.19 TINNITUS, UNSPECIFIED EAR: ICD-10-CM

## 2019-12-31 DIAGNOSIS — H90.42 SENSORINEURAL HEARING LOSS, UNILATERAL, LEFT EAR, WITH UNRESTRICTED HEARING ON THE CONTRALATERAL SIDE: ICD-10-CM

## 2019-12-31 PROCEDURE — 92567 TYMPANOMETRY: CPT

## 2019-12-31 PROCEDURE — 92557 COMPREHENSIVE HEARING TEST: CPT

## 2019-12-31 PROCEDURE — G0268 REMOVAL OF IMPACTED WAX MD: CPT

## 2019-12-31 PROCEDURE — 99213 OFFICE O/P EST LOW 20 MIN: CPT | Mod: 25

## 2019-12-31 RX ORDER — DORZOLAMIDE HYDROCHLORIDE TIMOLOL MALEATE 20; 5 MG/ML; MG/ML
22.3-6.8 SOLUTION/ DROPS OPHTHALMIC
Qty: 1 | Refills: 3 | Status: DISCONTINUED | COMMUNITY
Start: 2018-12-10 | End: 2019-12-31

## 2019-12-31 RX ORDER — TRAVOPROST 0.04 MG/ML
0 SOLUTION/ DROPS OPHTHALMIC
Refills: 0 | Status: DISCONTINUED | COMMUNITY
End: 2019-12-31

## 2019-12-31 RX ORDER — DORZOLAMIDE HYDROCHLORIDE TIMOLOL MALEATE 20; 5 MG/ML; MG/ML
22.3-6.8 SOLUTION/ DROPS OPHTHALMIC TWICE DAILY
Qty: 1 | Refills: 3 | Status: DISCONTINUED | COMMUNITY
Start: 2019-04-22 | End: 2019-12-31

## 2019-12-31 NOTE — ASSESSMENT
[FreeTextEntry1] : JONNA GOODE feels better after cerumen removal. he has mild asymmetry and presbycusis. I do not think he needs hearing aids. RTC one year.

## 2019-12-31 NOTE — REVIEW OF SYSTEMS
[Dizziness] : dizziness [Eyesight Problems] : eyesight problems [Heart Rate Is Slow] : slow heart rate [Cough] : cough [Patient Intake Form Reviewed] : Patient intake form was reviewed

## 2020-01-07 ENCOUNTER — APPOINTMENT (OUTPATIENT)
Dept: UROLOGY | Facility: CLINIC | Age: 80
End: 2020-01-07
Payer: MEDICARE

## 2020-01-07 VITALS — SYSTOLIC BLOOD PRESSURE: 115 MMHG | TEMPERATURE: 98.3 F | HEART RATE: 60 BPM | DIASTOLIC BLOOD PRESSURE: 66 MMHG

## 2020-01-07 PROCEDURE — 99213 OFFICE O/P EST LOW 20 MIN: CPT

## 2020-01-07 NOTE — HISTORY OF PRESENT ILLNESS
[FreeTextEntry1] : neuro eval for myasthenia gravis proved negative\par remains on alfuzoin\par noted improvemeint in urinary symptoms\par urgency and frequency ar emuch better\par \par no urinary bother\par no interest in outlet procedure at this time\par +ED

## 2020-01-13 ENCOUNTER — APPOINTMENT (OUTPATIENT)
Dept: HEART AND VASCULAR | Facility: CLINIC | Age: 80
End: 2020-01-13
Payer: MEDICARE

## 2020-01-13 ENCOUNTER — NON-APPOINTMENT (OUTPATIENT)
Age: 80
End: 2020-01-13

## 2020-01-13 VITALS
HEIGHT: 71 IN | SYSTOLIC BLOOD PRESSURE: 110 MMHG | DIASTOLIC BLOOD PRESSURE: 72 MMHG | BODY MASS INDEX: 21.7 KG/M2 | WEIGHT: 155 LBS | HEART RATE: 64 BPM | OXYGEN SATURATION: 98 %

## 2020-01-13 DIAGNOSIS — Z00.00 ENCOUNTER FOR GENERAL ADULT MEDICAL EXAMINATION W/OUT ABNORMAL FINDINGS: ICD-10-CM

## 2020-01-13 PROCEDURE — 93306 TTE W/DOPPLER COMPLETE: CPT

## 2020-01-13 PROCEDURE — 99213 OFFICE O/P EST LOW 20 MIN: CPT | Mod: 25

## 2020-01-13 PROCEDURE — 93000 ELECTROCARDIOGRAM COMPLETE: CPT

## 2020-01-13 PROCEDURE — 99213 OFFICE O/P EST LOW 20 MIN: CPT

## 2020-01-13 NOTE — PHYSICAL EXAM
[Normal Appearance] : normal appearance [General Appearance - Well Developed] : well developed [Well Groomed] : well groomed [No Deformities] : no deformities [General Appearance - Well Nourished] : well nourished [General Appearance - In No Acute Distress] : no acute distress [Normal Jugular Venous A Waves Present] : normal jugular venous A waves present [No Jugular Venous Chahal A Waves] : no jugular venous chahal A waves [Normal Jugular Venous V Waves Present] : normal jugular venous V waves present [Respiration, Rhythm And Depth] : normal respiratory rhythm and effort [Auscultation Breath Sounds / Voice Sounds] : lungs were clear to auscultation bilaterally [Exaggerated Use Of Accessory Muscles For Inspiration] : no accessory muscle use [Heart Rate And Rhythm] : heart rate and rhythm were normal [Murmurs] : no murmurs present [Heart Sounds] : normal S1 and S2 [Cyanosis, Localized] : no localized cyanosis [Nail Clubbing] : no clubbing of the fingernails [Petechial Hemorrhages (___cm)] : no petechial hemorrhages [] : no ischemic changes

## 2020-01-13 NOTE — DISCUSSION/SUMMARY
[Atrial Fibrillation] : atrial fibrillation [Slow Ventricular Response] : slow ventricular response [Deteriorating] : deteriorating [Decompensated] : decompensated [Not Responding to Treatment] : not responding to treatment [Electrophysiologic Studies] : electrophysiologic studies [ECG] : an ECG [Echocardiogram] : echocardiogram [Coronary Artery Disease] : coronary artery disease [Hyperlipidemia] : hyperlipidemia [Hypertension] : hypertension [Left Heart Failure] : left heart failure [None] : none [Stable] : stable

## 2020-01-13 NOTE — HISTORY OF PRESENT ILLNESS
[FreeTextEntry1] : 78 y/o man with hx lung Ca s/p RML resection (several years ago, followed at \[facility], in remission as per patient),  s/p recent CABG at [facility] 3/27/18 (LIMA-LAD, \par ALY-RI, SVG-PDA) with uneventful course, since surgery has been with \par progressive dyspnea, generalized weakness and LE edema - had  recent admission \par to [facility] for above with CTA chest negative for PE, echo with EF 60-65%,  treated \par with lasix, discharged on 4/9/18 with Hgb/HCT at that time  9.2/28.6.  On \par 4/10/18 noted to be A fib and was started on xarelto and amiodarone.  On \par 4/26/18  sent from cardiologist office to [facility] for A flutter and worsened LE \par edema and dyspnea, found to be severely anemic with Hgb/HCT 6.2/22.7, VSS \par with /59,. HR 60-70's, RR 18, O2 sat 98% RA,  EKG with A flutter VR 64, \par troponin 0.02, BNP  2,579  and fluid overloaded on exam with mostly LE edema. \par In ED treated with Lasix 40mg IVP x 1 with good UO and started RBC blood \par transfusion ( ordered for two units  with IV lasix in between). Patient \par admitted to  Ur for further work-up. CT Chest w/o to r/o post op bleed (CABG \par 3/2018): No acute thoracic pathology. EP consulted and recommended BRIAN/DCCV \par once patient cleared for Anti-coagulation and CBC remained stable. During \par hospital course patient received total of 3 units pRBC : 2 Units of PRBC \par 4/26/2018 and 1 Unit of PRBC 4/29/2018. GI consulted and patient s/p EGD and \par Colonoscopy (4/30/2018): EGD: Clean base ulcer in duodenal bulb (likely culprit \par of bleed per GI; no intervention performed though as currently healing). \par C-Scope: external Hemorrhoids. Patient treated with Protonix 40 mg IV BID since \par admission with plan to transition to PO for 6-8 weeks on discharge. Hgb/HCT \par remained stable and patient resumed Anticoagulation Xarelto 20 mg PO Daily on \par 5/1/18 after being cleared by GI to do so. Patient observed on AC for 24 hrs to \par ensure no recurrence of bleed. CBC remained stable and patient underwent BRIAN \par 5/3 which revealed no clot in LA or EDWINA, Left atrial appendage emptying \par velocities mildly are reduced, normal wall motion and LV function. Patient s/p \par DCCV now in NSR and Toprol XL reduced to 25 mg PO Daily. Patient will likely \par have ablation in future. Hospital course also significant for Acute Diastolic \par CHF for which patient diuresed with Lasix IV.-CT chest (4/27/2018): No \par effusion. -Echo (4/27/2018): mild AV thickening, mild LV thickening, mild MAC, \par trace MR, trace TR, LVEF 55-60%. No pericardial effusion. Lower extremity \par venous Duplex 04/27 no DVT seen. Hospital course also significant for \par Transaminitis for which Lipitor was held x 72 hours and LFTs normalized. \par Limited RUQ ultrasound 4/30: Cholelithiasis. No biliary dilatation. \par Leukocytosis present on admission now resolved. CXR and CT Chest with no \par infiltrate. -UCX negative. Physical Therapy evaluated and recommends home with \par home PT.\par \par location: chest\par duration: interm\par  modifying factors: rest\par timing: seconds\par severity: 8/10\par \par feels very dehydrated no edema touble sleeping and dizzinewss\par EKG marked sinus ruth 48 bpm on amio and bb lasix metolazone\par \par despite removal of BB and anti HTN pt remains fatigued, ekg sinus ruth 50 qrd 124ms remains on amio\par \par 4/15/19 USOH, 100% compliant with meds\par location: chest\par duration: na\par  modifying factors: na\par timing: na\par severity: 0/10\par EKG unchanged from prior (in chart)\par consultation notes reviewed where appropriate\par \par 10/24/19 Interval Symptoms: usual state of health, 100% compliant with meds\par location: chest\par duration: none\par  modifying factors: none\par timing: none\par severity: 0/10\par EKG unchanged from prior (in chart)\par consultation notes reviewed where appropriate\par NYHA Functional Class: I\par Home monitoring: Blood Pressure\par Interval Triggers: None. \par Medications: the patient is adherent with his medication regimen. denies medication side effects. \par Disease Management: the patient is doing well with goals. \par \par  1/13/20\par Interval Symptoms: EKG today unchanged from prior (in chart)\par usual state of health, no new complaints\par SOB - no\par chest pain - no\par location: chest\par duration: none\par modifying factors: none\par timing: none\par severity: 0/10\par Medications: the patient is adherent with his medication regimen. denies medication side effects. \par Disease Management: the patient is doing well with goals. \par \par

## 2020-01-14 ENCOUNTER — NON-APPOINTMENT (OUTPATIENT)
Age: 80
End: 2020-01-14

## 2020-01-14 ENCOUNTER — APPOINTMENT (OUTPATIENT)
Dept: OPHTHALMOLOGY | Facility: CLINIC | Age: 80
End: 2020-01-14
Payer: MEDICARE

## 2020-01-14 PROCEDURE — 92014 COMPRE OPH EXAM EST PT 1/>: CPT

## 2020-01-22 ENCOUNTER — APPOINTMENT (OUTPATIENT)
Dept: HEART AND VASCULAR | Facility: CLINIC | Age: 80
End: 2020-01-22
Payer: MEDICARE

## 2020-01-22 ENCOUNTER — NON-APPOINTMENT (OUTPATIENT)
Age: 80
End: 2020-01-22

## 2020-01-22 VITALS
WEIGHT: 155 LBS | SYSTOLIC BLOOD PRESSURE: 114 MMHG | HEART RATE: 69 BPM | HEIGHT: 71 IN | BODY MASS INDEX: 21.7 KG/M2 | DIASTOLIC BLOOD PRESSURE: 62 MMHG

## 2020-01-22 PROCEDURE — 93000 ELECTROCARDIOGRAM COMPLETE: CPT

## 2020-01-22 PROCEDURE — 99215 OFFICE O/P EST HI 40 MIN: CPT | Mod: 25

## 2020-01-27 ENCOUNTER — OUTPATIENT (OUTPATIENT)
Dept: OUTPATIENT SERVICES | Facility: HOSPITAL | Age: 80
LOS: 1 days | Discharge: HOME | End: 2020-01-27
Payer: MEDICARE

## 2020-01-27 DIAGNOSIS — Z98.890 OTHER SPECIFIED POSTPROCEDURAL STATES: Chronic | ICD-10-CM

## 2020-01-27 DIAGNOSIS — Z86.69 PERSONAL HISTORY OF OTHER DISEASES OF THE NERVOUS SYSTEM AND SENSE ORGANS: Chronic | ICD-10-CM

## 2020-01-27 DIAGNOSIS — Z95.1 PRESENCE OF AORTOCORONARY BYPASS GRAFT: Chronic | ICD-10-CM

## 2020-01-27 PROCEDURE — 93623 PRGRMD STIMJ&PACG IV RX NFS: CPT

## 2020-01-27 PROCEDURE — 99235 HOSP IP/OBS SAME DATE MOD 70: CPT

## 2020-01-27 PROCEDURE — 93655 ICAR CATH ABLTJ DSCRT ARRHYT: CPT

## 2020-01-27 PROCEDURE — C1766: CPT

## 2020-01-27 PROCEDURE — 93656 COMPRE EP EVAL ABLTJ ATR FIB: CPT

## 2020-01-27 PROCEDURE — C1730: CPT

## 2020-01-27 PROCEDURE — C1894: CPT

## 2020-01-27 PROCEDURE — 93613 INTRACARDIAC EPHYS 3D MAPG: CPT

## 2020-01-27 PROCEDURE — C1732: CPT

## 2020-01-27 PROCEDURE — 93623 PRGRMD STIMJ&PACG IV RX NFS: CPT | Mod: 26

## 2020-01-27 NOTE — DISCUSSION/SUMMARY
[FreeTextEntry1] : 80 y/o man with history of lung Ca s/p R mid lobe resection (in remission as per patient), CAD s/p recent CABG at [facility] 3/27/18 (LIMA-LAD, ALY-RI, SVG-PDA) who was admitted to [facility] 4/2018 with afib/aflutter s/p cardioversion; who presents for follow up.  He is in likely typical atrial flutter today.  He is maintained on oral anticoagulation and off antiarrhythmic medications.  He had atrial fibrillation int he setting of GI bleed and post CABG.  We discussed that these are two separate arrhythmias.  Options include observation / rate control, antiarrhythmic medications, cardioversion or an ablation.  We discussed the options of proceeding with an aflutter ablation +/- Afib ablation.  After discussion the decision was made to proceed with aflutter ablation.  We discussed the procedure in detial including risks, benefits and alternatives.  He was given erica procedure instructions.  He knows to call with any questions or concerns.

## 2020-01-27 NOTE — PROGRESS NOTE ADULT - SUBJECTIVE AND OBJECTIVE BOX
HPI   78 y/o man with history of lung Ca s/p R mid lobe resection (in remission as per patient), CAD s/p CABG at [facility] 3/27/18 (LIMA-LAD, ALY-RI, SVG-PDA) who was admitted to [facility] 4/2018 with afib/aflutter s/p cardioversion, recently found to be in typical atrial flutter who presents for elective ablation.      He was admitted to OSH 4/9/18 with weakness, GOODWIN and lower extremity edema.  CTA was negative for PE and echo EF 60-65%.  He was diuresed and went into atrial fibrillation and started on amiodarone and xarelto.  He was then admitted to [facility] 4/26/18 with atrial flutter, GOODWIN, LE edema and anemia (Hgb/HCT 6.2/22.7).   He was diuresed with IV lasix and transfused blood.  CT chest without bleed.  He underwent an EGD and colonoscopy with an ulcer (likely culprit). His H/H remained stable and he was placed on xarelto and underwent BRIAN/DCCV.  Echo with a EF 55-60%.    His amiodarone was stopped.  He had one episode of near syncope and was found to be in atrial flutter.  He remains active and goes to the gym but is hesitant he will have another episode.  No fatigue, chest pain, palpitations, SOB, syncope.  Some stable GOODWIN.   He is tolerating oral anticoagulation without issues.       PMH AS ABOVE  PSH AS ABOVE  FAM HX HTN, cardiac d/o (M - d.)  Social Never smoker  ALL NKA  Meds   Dorzalamde HCL Timolol Maleate 1 drop right eye 2x/day  Travoprost 1 drop right and left eye daily  vitamin d3 1000 iu po daily  vitamin b12 100 mcg po daily  atorvastatin 80 mg po daily  xarelto 20 mg po daily  alfuzosin hcl 10 mg po daily  ferrous sulfate 325 mg po daily    PE  a&oX3 no FD  CVS s1s2 reg irreg no m/r/g  pulm cta b/l  abd soft _ bs nt/nd  ext no edema, distal pulses intact    A/P 78 y/o M with typical atrial flutter on oral anticoagulation uninterrupted  Proceed with catheter ablation of his arrhythmia substrate as discussed.  R/B/A d/w him in great detail.  Risks including, but not limited to infection, vascular injury, TE/CVA, cardiac perforation were among those discussed.  Verbalized understanding, informed consent signed and placed in chart.  D/C home pending clinical progress.

## 2020-01-27 NOTE — REVIEW OF SYSTEMS
[see HPI] : see HPI [Negative] : Heme/Lymph [Fever] : no fever [Chills] : no chills [Cough] : no cough [Wheezing] : no wheezing [Coughing Up Blood] : no hemoptysis

## 2020-01-27 NOTE — PHYSICAL EXAM
[General Appearance - Well Developed] : well developed [Normal Appearance] : normal appearance [Well Groomed] : well groomed [General Appearance - Well Nourished] : well nourished [No Deformities] : no deformities [General Appearance - In No Acute Distress] : no acute distress [Normal Conjunctiva] : the conjunctiva exhibited no abnormalities [Normal Oral Mucosa] : normal oral mucosa [Normal Jugular Venous V Waves Present] : normal jugular venous V waves present [Respiration, Rhythm And Depth] : normal respiratory rhythm and effort [] : no respiratory distress [Auscultation Breath Sounds / Voice Sounds] : lungs were clear to auscultation bilaterally [Exaggerated Use Of Accessory Muscles For Inspiration] : no accessory muscle use [Heart Rate And Rhythm] : heart rate and rhythm were normal [Heart Sounds] : normal S1 and S2 [Skin Color & Pigmentation] : normal skin color and pigmentation [Cyanosis, Localized] : no localized cyanosis [Oriented To Time, Place, And Person] : oriented to person, place, and time [Impaired Insight] : insight and judgment were intact [Mood] : the mood was normal [Affect] : the affect was normal [No Anxiety] : not feeling anxious [Abnormal Walk] : normal gait

## 2020-01-27 NOTE — PROGRESS NOTE ADULT - SUBJECTIVE AND OBJECTIVE BOX
JONNA GOODE  [id number]    PROCEDURE:  Atrial Flutter ablation          INDICATION:  Atrial Flutter        ELECTROPHYSIOLOGIST(S):  MD Ranjit Cruz, MD Torsten MD        FINDINGS:  - CTI line with bidirectional block      COMPLICATIONS:  None      RECOMMENDATIONS:  - bed rest 4 hours  - Resume AC

## 2020-01-27 NOTE — HISTORY OF PRESENT ILLNESS
[FreeTextEntry1] : 80 y/o man with history of lung Ca s/p R mid lobe resection (in remission as per patient), CAD s/p CABG at [facility] 3/27/18 (LIMA-LAD, ALY-RI, SVG-PDA) who was admitted to [facility] 4/2018 with afib/aflutter s/p cardioversion; who presents for follow up.\par \par He was admitted to OSH 4/9/18 with weakness, GOODWIN and lower extremity edema.  CTA was negative for PE and echo EF 60-65%.  He was diuresed and went into atrial fibrillation and started on amiodarone and xarelto.  He was then admitted to [facility] 4/26/18 with atrial flutter, GOODWIN, LE edema and anemia (Hgb/HCT 6.2/22.7).   He was diuresed with IV lasix and transfused blood.  CT chest without bleed.  He underwent an EGD and colonoscopy with an ulcer (likely culprit). His H/H remained stable and he was placed on xarelto and underwent BRIAN/DCCV.  Echo with a EF 55-60%.  \par His amiodarone was stopped.  He had one episode of near syncope and was found to be in atrial flutter.  He remains active and goes to the gym but is hesitant he will have another episode.  No fatigue, chest pain, palpitations, SOB, syncope.  Some stable GOODWIN.   He is tolerating oral anticoagulation without issues.

## 2020-01-28 ENCOUNTER — APPOINTMENT (OUTPATIENT)
Dept: INTERNAL MEDICINE | Facility: CLINIC | Age: 80
End: 2020-01-28

## 2020-02-19 ENCOUNTER — APPOINTMENT (OUTPATIENT)
Dept: INTERNAL MEDICINE | Facility: CLINIC | Age: 80
End: 2020-02-19
Payer: MEDICARE

## 2020-02-19 VITALS
HEART RATE: 59 BPM | TEMPERATURE: 97.9 F | DIASTOLIC BLOOD PRESSURE: 69 MMHG | BODY MASS INDEX: 21.42 KG/M2 | SYSTOLIC BLOOD PRESSURE: 109 MMHG | OXYGEN SATURATION: 100 % | HEIGHT: 71 IN | WEIGHT: 153 LBS

## 2020-02-19 PROCEDURE — 36415 COLL VENOUS BLD VENIPUNCTURE: CPT

## 2020-02-19 PROCEDURE — 99213 OFFICE O/P EST LOW 20 MIN: CPT | Mod: 25

## 2020-02-19 NOTE — PHYSICAL EXAM
[No Acute Distress] : no acute distress [Well Nourished] : well nourished [Well Developed] : well developed [Well-Appearing] : well-appearing [PERRL] : pupils equal round and reactive to light [Normal Sclera/Conjunctiva] : normal sclera/conjunctiva [EOMI] : extraocular movements intact [Normal Outer Ear/Nose] : the outer ears and nose were normal in appearance [Normal Oropharynx] : the oropharynx was normal [Supple] : supple [No Lymphadenopathy] : no lymphadenopathy [No JVD] : no jugular venous distention [Thyroid Normal, No Nodules] : the thyroid was normal and there were no nodules present [No Respiratory Distress] : no respiratory distress  [Clear to Auscultation] : lungs were clear to auscultation bilaterally [No Accessory Muscle Use] : no accessory muscle use [Normal S1, S2] : normal S1 and S2 [Normal Rate] : normal rate  [Regular Rhythm] : with a regular rhythm [No Abdominal Bruit] : a ~M bruit was not heard ~T in the abdomen [No Murmur] : no murmur heard [No Carotid Bruits] : no carotid bruits [Pedal Pulses Present] : the pedal pulses are present [No Varicosities] : no varicosities [No Palpable Aorta] : no palpable aorta [No Edema] : there was no peripheral edema [No Extremity Clubbing/Cyanosis] : no extremity clubbing/cyanosis [Soft] : abdomen soft [Non Tender] : non-tender [Non-distended] : non-distended [No HSM] : no HSM [No Masses] : no abdominal mass palpated [Normal Posterior Cervical Nodes] : no posterior cervical lymphadenopathy [Normal Anterior Cervical Nodes] : no anterior cervical lymphadenopathy [Normal Bowel Sounds] : normal bowel sounds [No Spinal Tenderness] : no spinal tenderness [No CVA Tenderness] : no CVA  tenderness [No Joint Swelling] : no joint swelling [Coordination Grossly Intact] : coordination grossly intact [Grossly Normal Strength/Tone] : grossly normal strength/tone [No Rash] : no rash [Deep Tendon Reflexes (DTR)] : deep tendon reflexes were 2+ and symmetric [Normal Gait] : normal gait [No Focal Deficits] : no focal deficits [Normal Insight/Judgement] : insight and judgment were intact [Normal Affect] : the affect was normal

## 2020-02-19 NOTE — HEALTH RISK ASSESSMENT
[No] : No [No falls in past year] : Patient reported no falls in the past year [0] : 2) Feeling down, depressed, or hopeless: Not at all (0) [[id number]] : 0 [] : No

## 2020-02-19 NOTE — ASSESSMENT
[FreeTextEntry1] : Stable examination; Will repeat CBC;  Also some exercise; Now in Sinus; Ablation successful

## 2020-02-19 NOTE — HISTORY OF PRESENT ILLNESS
[FreeTextEntry1] : Being followed at [facility] regarding the lung cancer. Also atrial Fib; S/P ablation;  [de-identified] : Otherwise feeling well;  Still on blood thinners

## 2020-02-20 LAB
BASOPHILS # BLD AUTO: 0.03 K/UL
BASOPHILS NFR BLD AUTO: 0.5 %
EOSINOPHIL # BLD AUTO: 0.17 K/UL
EOSINOPHIL NFR BLD AUTO: 2.9 %
HCT VFR BLD CALC: 38.1 %
HGB BLD-MCNC: 12 G/DL
IMM GRANULOCYTES NFR BLD AUTO: 0.2 %
LYMPHOCYTES # BLD AUTO: 1.37 K/UL
LYMPHOCYTES NFR BLD AUTO: 23.3 %
MAN DIFF?: NORMAL
MCHC RBC-ENTMCNC: 31.3 PG
MCHC RBC-ENTMCNC: 31.5 GM/DL
MCV RBC AUTO: 99.2 FL
MONOCYTES # BLD AUTO: 0.65 K/UL
MONOCYTES NFR BLD AUTO: 11.1 %
NEUTROPHILS # BLD AUTO: 3.65 K/UL
NEUTROPHILS NFR BLD AUTO: 62 %
PLATELET # BLD AUTO: 232 K/UL
RBC # BLD: 3.84 M/UL
RBC # FLD: 13.4 %
WBC # FLD AUTO: 5.88 K/UL

## 2020-02-26 ENCOUNTER — NON-APPOINTMENT (OUTPATIENT)
Age: 80
End: 2020-02-26

## 2020-02-26 ENCOUNTER — APPOINTMENT (OUTPATIENT)
Dept: HEART AND VASCULAR | Facility: CLINIC | Age: 80
End: 2020-02-26
Payer: MEDICARE

## 2020-02-26 VITALS
HEART RATE: 58 BPM | WEIGHT: 153 LBS | SYSTOLIC BLOOD PRESSURE: 115 MMHG | HEIGHT: 71 IN | DIASTOLIC BLOOD PRESSURE: 57 MMHG | BODY MASS INDEX: 21.42 KG/M2

## 2020-02-26 PROCEDURE — 99214 OFFICE O/P EST MOD 30 MIN: CPT | Mod: 25

## 2020-02-26 PROCEDURE — 93000 ELECTROCARDIOGRAM COMPLETE: CPT

## 2020-03-03 NOTE — PHYSICAL EXAM
[Normal Appearance] : normal appearance [General Appearance - Well Developed] : well developed [Well Groomed] : well groomed [General Appearance - Well Nourished] : well nourished [No Deformities] : no deformities [General Appearance - In No Acute Distress] : no acute distress [Normal Conjunctiva] : the conjunctiva exhibited no abnormalities [Normal Oral Mucosa] : normal oral mucosa [Normal Jugular Venous V Waves Present] : normal jugular venous V waves present [] : no respiratory distress [Respiration, Rhythm And Depth] : normal respiratory rhythm and effort [Exaggerated Use Of Accessory Muscles For Inspiration] : no accessory muscle use [Auscultation Breath Sounds / Voice Sounds] : lungs were clear to auscultation bilaterally [Heart Rate And Rhythm] : heart rate and rhythm were normal [Heart Sounds] : normal S1 and S2 [Abnormal Walk] : normal gait [Cyanosis, Localized] : no localized cyanosis [Skin Color & Pigmentation] : normal skin color and pigmentation [Oriented To Time, Place, And Person] : oriented to person, place, and time [Impaired Insight] : insight and judgment were intact [Affect] : the affect was normal [Mood] : the mood was normal [No Anxiety] : not feeling anxious [Edema] : no peripheral edema present

## 2020-07-08 ENCOUNTER — APPOINTMENT (OUTPATIENT)
Dept: HEART AND VASCULAR | Facility: CLINIC | Age: 80
End: 2020-07-08

## 2020-07-22 ENCOUNTER — APPOINTMENT (OUTPATIENT)
Dept: OPHTHALMOLOGY | Facility: CLINIC | Age: 80
End: 2020-07-22

## 2020-07-23 ENCOUNTER — APPOINTMENT (OUTPATIENT)
Dept: UROLOGY | Facility: CLINIC | Age: 80
End: 2020-07-23
Payer: MEDICARE

## 2020-07-23 VITALS
OXYGEN SATURATION: 98 % | SYSTOLIC BLOOD PRESSURE: 128 MMHG | HEART RATE: 74 BPM | DIASTOLIC BLOOD PRESSURE: 75 MMHG | TEMPERATURE: 98.1 F

## 2020-07-23 PROCEDURE — 99213 OFFICE O/P EST LOW 20 MIN: CPT

## 2020-07-23 NOTE — PHYSICAL EXAM
[General Appearance - Well Developed] : well developed [Normal Appearance] : normal appearance [General Appearance - Well Nourished] : well nourished [General Appearance - In No Acute Distress] : no acute distress [Well Groomed] : well groomed [Abdomen Tenderness] : non-tender [Abdomen Soft] : soft [Penis Abnormality] : normal circumcised penis [Costovertebral Angle Tenderness] : no ~M costovertebral angle tenderness [Urinary Bladder Findings] : the bladder was normal on palpation [Scrotum] : the scrotum was normal [Epididymis] : the epididymides were normal [Rectal Exam - Seminal Vesicles] : the seminal vesicles were normal [Testes Tenderness] : no tenderness of the testes [Testes Mass (___cm)] : there were no testicular masses [Edema] : no peripheral edema [] : no respiratory distress [Exaggerated Use Of Accessory Muscles For Inspiration] : no accessory muscle use [Respiration, Rhythm And Depth] : normal respiratory rhythm and effort [Oriented To Time, Place, And Person] : oriented to person, place, and time [Affect] : the affect was normal [Mood] : the mood was normal [Not Anxious] : not anxious

## 2020-07-27 NOTE — ASSESSMENT
[FreeTextEntry1] : BPH\par -PVR today was 91 cc\par -continue with Alfuzosin as medication has been effective\par -currently content with urinary symptoms at this time and not interested in surgical procedure\par \par RTC in 1 year

## 2020-07-27 NOTE — HISTORY OF PRESENT ILLNESS
[FreeTextEntry1] : This is a 79 year old male being followed for LUTS.\par Has been on Alfuzosin with continued relief of symptoms.\par Decrease in frequency and urgency patient had been experiencing. \par \par Feels like he empties bladder fully, no hematuria, dysuria, or urinary incontinence.\par \par Has not tried Sildenafil for ED yet, reports " not interested right now."

## 2020-07-28 ENCOUNTER — OUTPATIENT (OUTPATIENT)
Dept: OUTPATIENT SERVICES | Facility: HOSPITAL | Age: 80
LOS: 1 days | End: 2020-07-28
Payer: MEDICARE

## 2020-07-28 ENCOUNTER — APPOINTMENT (OUTPATIENT)
Dept: INTERNAL MEDICINE | Facility: CLINIC | Age: 80
End: 2020-07-28
Payer: MEDICARE

## 2020-07-28 VITALS
WEIGHT: 151 LBS | DIASTOLIC BLOOD PRESSURE: 70 MMHG | BODY MASS INDEX: 21.14 KG/M2 | SYSTOLIC BLOOD PRESSURE: 119 MMHG | TEMPERATURE: 98.6 F | OXYGEN SATURATION: 100 % | HEIGHT: 71 IN | RESPIRATION RATE: 12 BRPM | HEART RATE: 56 BPM

## 2020-07-28 DIAGNOSIS — Z86.69 PERSONAL HISTORY OF OTHER DISEASES OF THE NERVOUS SYSTEM AND SENSE ORGANS: Chronic | ICD-10-CM

## 2020-07-28 DIAGNOSIS — M54.5 LOW BACK PAIN: ICD-10-CM

## 2020-07-28 DIAGNOSIS — Z98.890 OTHER SPECIFIED POSTPROCEDURAL STATES: Chronic | ICD-10-CM

## 2020-07-28 DIAGNOSIS — Z95.1 PRESENCE OF AORTOCORONARY BYPASS GRAFT: Chronic | ICD-10-CM

## 2020-07-28 PROCEDURE — 72100 X-RAY EXAM L-S SPINE 2/3 VWS: CPT | Mod: 26

## 2020-07-28 PROCEDURE — 99213 OFFICE O/P EST LOW 20 MIN: CPT

## 2020-07-28 NOTE — HISTORY OF PRESENT ILLNESS
[FreeTextEntry1] : Interim reviewed;  [de-identified] : Urination without a problem; Takes same problems; Has lower back pain; Does exercise;\par Has had back pain for years; No recent imaging

## 2020-07-28 NOTE — PHYSICAL EXAM
[No Acute Distress] : no acute distress [Well Nourished] : well nourished [Well Developed] : well developed [PERRL] : pupils equal round and reactive to light [Well-Appearing] : well-appearing [Normal Sclera/Conjunctiva] : normal sclera/conjunctiva [Normal Outer Ear/Nose] : the outer ears and nose were normal in appearance [EOMI] : extraocular movements intact [No JVD] : no jugular venous distention [Normal Oropharynx] : the oropharynx was normal [No Lymphadenopathy] : no lymphadenopathy [No Respiratory Distress] : no respiratory distress  [Thyroid Normal, No Nodules] : the thyroid was normal and there were no nodules present [Supple] : supple [Clear to Auscultation] : lungs were clear to auscultation bilaterally [No Accessory Muscle Use] : no accessory muscle use [Normal Rate] : normal rate  [Regular Rhythm] : with a regular rhythm [Normal S1, S2] : normal S1 and S2 [No Murmur] : no murmur heard [No Carotid Bruits] : no carotid bruits [No Abdominal Bruit] : a ~M bruit was not heard ~T in the abdomen [No Varicosities] : no varicosities [Pedal Pulses Present] : the pedal pulses are present [No Edema] : there was no peripheral edema [No Palpable Aorta] : no palpable aorta [No Extremity Clubbing/Cyanosis] : no extremity clubbing/cyanosis [Non Tender] : non-tender [Soft] : abdomen soft [Non-distended] : non-distended [No Masses] : no abdominal mass palpated [No HSM] : no HSM [Normal Bowel Sounds] : normal bowel sounds [Normal Posterior Cervical Nodes] : no posterior cervical lymphadenopathy [No CVA Tenderness] : no CVA  tenderness [Normal Anterior Cervical Nodes] : no anterior cervical lymphadenopathy [No Spinal Tenderness] : no spinal tenderness [Grossly Normal Strength/Tone] : grossly normal strength/tone [No Joint Swelling] : no joint swelling [Coordination Grossly Intact] : coordination grossly intact [No Rash] : no rash [No Focal Deficits] : no focal deficits [Normal Gait] : normal gait [Deep Tendon Reflexes (DTR)] : deep tendon reflexes were 2+ and symmetric [Normal Affect] : the affect was normal [Normal Insight/Judgement] : insight and judgment were intact

## 2020-07-28 NOTE — HEALTH RISK ASSESSMENT
[No] : No [No falls in past year] : Patient reported no falls in the past year [0] : 2) Feeling down, depressed, or hopeless: Not at all (0) [] : No [[id number]] : 0

## 2020-07-29 ENCOUNTER — APPOINTMENT (OUTPATIENT)
Dept: OPHTHALMOLOGY | Facility: CLINIC | Age: 80
End: 2020-07-29
Payer: MEDICARE

## 2020-07-29 ENCOUNTER — NON-APPOINTMENT (OUTPATIENT)
Age: 80
End: 2020-07-29

## 2020-07-29 PROCEDURE — 92014 COMPRE OPH EXAM EST PT 1/>: CPT

## 2020-07-29 PROCEDURE — 92083 EXTENDED VISUAL FIELD XM: CPT

## 2020-07-29 PROCEDURE — 92133 CPTRZD OPH DX IMG PST SGM ON: CPT

## 2020-07-30 LAB
SARS-COV-2 IGG SERPL IA-ACNC: 0.08 INDEX
SARS-COV-2 IGG SERPL QL IA: NEGATIVE

## 2020-07-30 PROCEDURE — 72100 X-RAY EXAM L-S SPINE 2/3 VWS: CPT

## 2020-10-19 NOTE — H&P ADULT - NSHPLANGLIMITEDENGLISH_GEN_A_CORE
Mom has orders from pts. rheumatogist Dr. Halle Whitley stating pt. Needs labs while febrile. Pt. Has low grade fever and has been tested for Covid neg. Results and mom wanting to bring him in here for labs per order? ?  The fever has been ongoing since  No

## 2020-10-27 ENCOUNTER — APPOINTMENT (OUTPATIENT)
Dept: INTERNAL MEDICINE | Facility: CLINIC | Age: 80
End: 2020-10-27
Payer: MEDICARE

## 2020-10-27 VITALS
DIASTOLIC BLOOD PRESSURE: 69 MMHG | WEIGHT: 155 LBS | HEART RATE: 58 BPM | BODY MASS INDEX: 21.7 KG/M2 | OXYGEN SATURATION: 100 % | RESPIRATION RATE: 12 BRPM | SYSTOLIC BLOOD PRESSURE: 113 MMHG | TEMPERATURE: 97.7 F | HEIGHT: 71 IN

## 2020-10-27 DIAGNOSIS — H40.10X2 UNSPECIFIED OPEN-ANGLE GLAUCOMA, MODERATE STAGE: ICD-10-CM

## 2020-10-27 PROCEDURE — 99213 OFFICE O/P EST LOW 20 MIN: CPT | Mod: 25

## 2020-10-27 PROCEDURE — 36415 COLL VENOUS BLD VENIPUNCTURE: CPT

## 2020-10-27 PROCEDURE — 99072 ADDL SUPL MATRL&STAF TM PHE: CPT

## 2020-10-27 NOTE — HEALTH RISK ASSESSMENT
[No] : No [No falls in past year] : Patient reported no falls in the past year [0] : 2) Feeling down, depressed, or hopeless: Not at all (0) [] : No [[id number]] : 0

## 2020-10-27 NOTE — HISTORY OF PRESENT ILLNESS
[FreeTextEntry1] : No chief complaint [de-identified] : Did physical therapy ; Not much help; \par exercise class;  Weights other exercises; \par Other medication the same;

## 2020-10-30 LAB
ALBUMIN SERPL ELPH-MCNC: 4.4 G/DL
ALP BLD-CCNC: 100 U/L
ALT SERPL-CCNC: 26 U/L
ANION GAP SERPL CALC-SCNC: 14 MMOL/L
AST SERPL-CCNC: 35 U/L
BASOPHILS # BLD AUTO: 0.04 K/UL
BASOPHILS NFR BLD AUTO: 0.8 %
BILIRUB SERPL-MCNC: 1.1 MG/DL
BUN SERPL-MCNC: 16 MG/DL
CALCIUM SERPL-MCNC: 9.4 MG/DL
CHLORIDE SERPL-SCNC: 104 MMOL/L
CHOLEST SERPL-MCNC: 137 MG/DL
CO2 SERPL-SCNC: 25 MMOL/L
CREAT SERPL-MCNC: 0.9 MG/DL
EOSINOPHIL # BLD AUTO: 0.08 K/UL
EOSINOPHIL NFR BLD AUTO: 1.6 %
ESTIMATED AVERAGE GLUCOSE: 120 MG/DL
GLUCOSE SERPL-MCNC: 80 MG/DL
HBA1C MFR BLD HPLC: 5.8 %
HCT VFR BLD CALC: 37.2 %
HDLC SERPL-MCNC: 80 MG/DL
HGB BLD-MCNC: 11.6 G/DL
IMM GRANULOCYTES NFR BLD AUTO: 0.2 %
LDLC SERPL CALC-MCNC: 50 MG/DL
LYMPHOCYTES # BLD AUTO: 1.07 K/UL
LYMPHOCYTES NFR BLD AUTO: 20.9 %
MAN DIFF?: NORMAL
MCHC RBC-ENTMCNC: 31.2 GM/DL
MCHC RBC-ENTMCNC: 31.4 PG
MCV RBC AUTO: 100.5 FL
MONOCYTES # BLD AUTO: 0.51 K/UL
MONOCYTES NFR BLD AUTO: 10 %
NEUTROPHILS # BLD AUTO: 3.4 K/UL
NEUTROPHILS NFR BLD AUTO: 66.5 %
NONHDLC SERPL-MCNC: 56 MG/DL
PLATELET # BLD AUTO: 197 K/UL
POTASSIUM SERPL-SCNC: 4.5 MMOL/L
PROT SERPL-MCNC: 6.6 G/DL
PSA SERPL-MCNC: 0.95 NG/ML
RBC # BLD: 3.7 M/UL
RBC # FLD: 13.2 %
SODIUM SERPL-SCNC: 143 MMOL/L
T4 FREE SERPL-MCNC: 1.3 NG/DL
TRIGL SERPL-MCNC: 30 MG/DL
TSH SERPL-ACNC: 3.06 UIU/ML
WBC # FLD AUTO: 5.11 K/UL

## 2021-01-06 ENCOUNTER — APPOINTMENT (OUTPATIENT)
Dept: OTOLARYNGOLOGY | Facility: CLINIC | Age: 81
End: 2021-01-06
Payer: MEDICARE

## 2021-01-06 VITALS — TEMPERATURE: 98.7 F | WEIGHT: 155 LBS | BODY MASS INDEX: 21.7 KG/M2 | HEIGHT: 71 IN

## 2021-01-06 DIAGNOSIS — H93.8X9 OTHER SPECIFIED DISORDERS OF EAR, UNSPECIFIED EAR: ICD-10-CM

## 2021-01-06 PROCEDURE — 99213 OFFICE O/P EST LOW 20 MIN: CPT | Mod: 25

## 2021-01-06 PROCEDURE — 69210 REMOVE IMPACTED EAR WAX UNI: CPT

## 2021-01-06 PROCEDURE — 99072 ADDL SUPL MATRL&STAF TM PHE: CPT

## 2021-01-06 NOTE — HISTORY OF PRESENT ILLNESS
[de-identified] : JONNA GOODE is a 80 year man with a history of cerumen impaction. his are clogged. he is on Xarelto. he recently underwent MOHS surgery on right face.

## 2021-01-27 ENCOUNTER — APPOINTMENT (OUTPATIENT)
Dept: INTERNAL MEDICINE | Facility: CLINIC | Age: 81
End: 2021-01-27
Payer: MEDICARE

## 2021-01-27 VITALS
TEMPERATURE: 97.9 F | HEIGHT: 71 IN | BODY MASS INDEX: 21.7 KG/M2 | SYSTOLIC BLOOD PRESSURE: 116 MMHG | HEART RATE: 53 BPM | DIASTOLIC BLOOD PRESSURE: 69 MMHG | WEIGHT: 155 LBS | OXYGEN SATURATION: 100 %

## 2021-01-27 PROCEDURE — 99072 ADDL SUPL MATRL&STAF TM PHE: CPT

## 2021-01-27 PROCEDURE — 99213 OFFICE O/P EST LOW 20 MIN: CPT

## 2021-01-27 NOTE — PHYSICAL EXAM
[No Acute Distress] : no acute distress [Well Nourished] : well nourished [Well Developed] : well developed [Well-Appearing] : well-appearing [Normal Sclera/Conjunctiva] : normal sclera/conjunctiva [PERRL] : pupils equal round and reactive to light [EOMI] : extraocular movements intact [Normal Outer Ear/Nose] : the outer ears and nose were normal in appearance [Normal Oropharynx] : the oropharynx was normal [No JVD] : no jugular venous distention [No Lymphadenopathy] : no lymphadenopathy [Supple] : supple [Thyroid Normal, No Nodules] : the thyroid was normal and there were no nodules present [No Respiratory Distress] : no respiratory distress  [No Accessory Muscle Use] : no accessory muscle use [Clear to Auscultation] : lungs were clear to auscultation bilaterally [Normal Rate] : normal rate  [Regular Rhythm] : with a regular rhythm [Normal S1, S2] : normal S1 and S2 [No Murmur] : no murmur heard [No Carotid Bruits] : no carotid bruits [No Abdominal Bruit] : a ~M bruit was not heard ~T in the abdomen [No Varicosities] : no varicosities [Pedal Pulses Present] : the pedal pulses are present [No Edema] : there was no peripheral edema [No Palpable Aorta] : no palpable aorta [No Extremity Clubbing/Cyanosis] : no extremity clubbing/cyanosis [Soft] : abdomen soft [Non Tender] : non-tender [Non-distended] : non-distended [No HSM] : no HSM [No Masses] : no abdominal mass palpated [Normal Bowel Sounds] : normal bowel sounds [Normal Posterior Cervical Nodes] : no posterior cervical lymphadenopathy [Normal Anterior Cervical Nodes] : no anterior cervical lymphadenopathy [No CVA Tenderness] : no CVA  tenderness [No Spinal Tenderness] : no spinal tenderness [No Joint Swelling] : no joint swelling [Grossly Normal Strength/Tone] : grossly normal strength/tone [No Rash] : no rash [Coordination Grossly Intact] : coordination grossly intact [No Focal Deficits] : no focal deficits [Normal Gait] : normal gait [Deep Tendon Reflexes (DTR)] : deep tendon reflexes were 2+ and symmetric [Normal Affect] : the affect was normal [Normal Insight/Judgement] : insight and judgment were intact

## 2021-01-27 NOTE — HEALTH RISK ASSESSMENT
[No] : No [No falls in past year] : Patient reported no falls in the past year [0] : 1) Little interest or pleasure doing things: Not at all (0) [] : No [[id number]] : 0

## 2021-01-27 NOTE — HISTORY OF PRESENT ILLNESS
[FreeTextEntry1] : Had derm surgery right temporal area; Post op hematoma\par Also post op had double vision; Recurrence of Myasthenia Gravis possibel [de-identified] : In addition still has evidence of double vision\par No recent follow up with neurologist\par If it continues may need follow up; \par Urine output adequate\par Received first dose of vaccine;

## 2021-02-05 ENCOUNTER — NON-APPOINTMENT (OUTPATIENT)
Age: 81
End: 2021-02-05

## 2021-02-06 ENCOUNTER — NON-APPOINTMENT (OUTPATIENT)
Age: 81
End: 2021-02-06

## 2021-02-09 ENCOUNTER — APPOINTMENT (OUTPATIENT)
Dept: HEART AND VASCULAR | Facility: CLINIC | Age: 81
End: 2021-02-09
Payer: MEDICARE

## 2021-02-09 ENCOUNTER — NON-APPOINTMENT (OUTPATIENT)
Age: 81
End: 2021-02-09

## 2021-02-09 VITALS — TEMPERATURE: 97.7 F

## 2021-02-09 VITALS
HEIGHT: 71 IN | WEIGHT: 155 LBS | BODY MASS INDEX: 21.7 KG/M2 | DIASTOLIC BLOOD PRESSURE: 70 MMHG | HEART RATE: 54 BPM | SYSTOLIC BLOOD PRESSURE: 140 MMHG | TEMPERATURE: 97.3 F

## 2021-02-09 DIAGNOSIS — Z86.69 PERSONAL HISTORY OF OTHER DISEASES OF THE NERVOUS SYSTEM AND SENSE ORGANS: ICD-10-CM

## 2021-02-09 PROCEDURE — 93306 TTE W/DOPPLER COMPLETE: CPT

## 2021-02-09 PROCEDURE — 99215 OFFICE O/P EST HI 40 MIN: CPT | Mod: 25

## 2021-02-09 PROCEDURE — 99072 ADDL SUPL MATRL&STAF TM PHE: CPT

## 2021-02-09 PROCEDURE — 93000 ELECTROCARDIOGRAM COMPLETE: CPT

## 2021-02-09 RX ORDER — FERROUS SULFATE TAB 325 MG (65 MG ELEMENTAL FE) 325 (65 FE) MG
325 (65 FE) TAB ORAL TWICE DAILY
Qty: 60 | Refills: 5 | Status: DISCONTINUED | COMMUNITY
Start: 2018-10-29 | End: 2021-02-09

## 2021-02-09 RX ORDER — DORZOLAMIDE HYDROCHLORIDE 20 MG/ML
2 SOLUTION OPHTHALMIC TWICE DAILY
Qty: 1 | Refills: 3 | Status: DISCONTINUED | COMMUNITY
Start: 2018-11-30 | End: 2021-02-09

## 2021-02-09 RX ORDER — METHYLDOPA 500 MG
160 (50 FE) TABLET ORAL TWICE DAILY
Qty: 60 | Refills: 5 | Status: DISCONTINUED | COMMUNITY
Start: 2018-05-29 | End: 2021-02-09

## 2021-02-09 RX ORDER — SILDENAFIL 100 MG/1
100 TABLET, FILM COATED ORAL
Qty: 6 | Refills: 11 | Status: DISCONTINUED | COMMUNITY
Start: 2020-01-07 | End: 2021-02-09

## 2021-02-09 NOTE — HISTORY OF PRESENT ILLNESS
[FreeTextEntry1] : 80 y/o man with history of lung Ca s/p R mid lobe resection (in remission as per patient), CAD s/p CABG at [facility] 3/27/18 (LIMA-LAD, ALY-RI, SVG-PDA) who was admitted to [facility] 4/2018 with afib/aflutter s/p cardioversion and ablation 2/2020; who presents for follow up.  He was admitted to OSH 4/9/18 with weakness, GOODWIN and lower extremity edema.  CTA was negative for PE and echo EF 60-65%.  He was diuresed and went into atrial fibrillation and started on amiodarone and xarelto.  He was then admitted to [facility] 4/26/18 with atrial flutter, GOODWIN, LE edema and anemia (Hgb/HCT 6.2/22.7).   He was diuresed with IV lasix and transfused blood.  CT chest without bleed.  He underwent an EGD and colonoscopy with an ulcer (likely culprit). His H/H remained stable and he was placed on xarelto and underwent BRIAN/DCCV.  Echo with a EF 55-60%.   His amiodarone was stopped.  He had one episode of near syncope and was found to be in atrial flutter. He underwent an ablation for atrial flutter early last year..  He presents for follow up and overall is doing well.     No fatigue, chest pain, palpitations, SOB, syncope.  Some stable GOODWIN.   He is tolerating oral anticoagulation without issues.   \par currently does cardio/weights 2-3 one hr sessions/week. no edema

## 2021-02-09 NOTE — PHYSICAL EXAM
[General Appearance - Well Developed] : well developed [Normal Appearance] : normal appearance [Well Groomed] : well groomed [General Appearance - Well Nourished] : well nourished [No Deformities] : no deformities [General Appearance - In No Acute Distress] : no acute distress [Normal Conjunctiva] : the conjunctiva exhibited no abnormalities [Normal Oral Mucosa] : normal oral mucosa [Normal Jugular Venous A Waves Present] : normal jugular venous A waves present [Normal Jugular Venous V Waves Present] : normal jugular venous V waves present [No Jugular Venous Chahal A Waves] : no jugular venous chahal A waves [Respiration, Rhythm And Depth] : normal respiratory rhythm and effort [] : no respiratory distress [Exaggerated Use Of Accessory Muscles For Inspiration] : no accessory muscle use [Auscultation Breath Sounds / Voice Sounds] : lungs were clear to auscultation bilaterally [Heart Rate And Rhythm] : heart rate and rhythm were normal [Heart Sounds] : normal S1 and S2 [Edema] : no peripheral edema present [Bowel Sounds] : normal bowel sounds [Abdomen Soft] : soft [Abdomen Tenderness] : non-tender [Nail Clubbing] : no clubbing of the fingernails [Abnormal Walk] : normal gait [Skin Color & Pigmentation] : normal skin color and pigmentation [Oriented To Time, Place, And Person] : oriented to person, place, and time [FreeTextEntry1] : soft systolic murmur

## 2021-02-09 NOTE — REVIEW OF SYSTEMS
[see HPI] : see HPI [Negative] : Eyes [Fever] : no fever [Chills] : no chills [Feeling Fatigued] : not feeling fatigued [Cough] : no cough [Wheezing] : no wheezing [Coughing Up Blood] : no hemoptysis

## 2021-02-09 NOTE — REVIEW OF SYSTEMS
[Eyeglasses] : currently wearing eyeglasses [Seeing Double (Diplopia)] : diplopia [Nocturia] : nocturia [see HPI] : see HPI [Skin Lesions] : skin lesion(s): [Negative] : Heme/Lymph [Loss Of Hearing] : no hearing loss [Hematuria] : no hematuria [Skin: A Rash] : no rash:

## 2021-02-09 NOTE — HISTORY OF PRESENT ILLNESS
[FreeTextEntry1] : 80 y/o man with history of lung Ca s/p R mid lobe resection (in remission as per patient), CAD s/p CABG at [facility] 3/27/18 (LIMA-LAD, ALY-RI, SVG-PDA) who was admitted to [facility] 4/2018 with afib/aflutter s/p cardioversion and ablation 2/2020; who presents for follow up.\par \par He was admitted to OSH 4/9/18 with weakness, GOODWIN and lower extremity edema.  CTA was negative for PE and echo EF 60-65%.  He was diuresed and went into atrial fibrillation and started on amiodarone and xarelto.  He was then admitted to [facility] 4/26/18 with atrial flutter, GOODWIN, LE edema and anemia (Hgb/HCT 6.2/22.7).   He was diuresed with IV lasix and transfused blood.  CT chest without bleed.  He underwent an EGD and colonoscopy with an ulcer (likely culprit). His H/H remained stable and he was placed on xarelto and underwent BRIAN/DCCV.  Echo with a EF 55-60%.  \par His amiodarone was stopped.  He had one episode of near syncope and was found to be in atrial flutter. Since his last visit he underwent an ablation.  He presents for follow up and overall is doing well.     No fatigue, chest pain, palpitations, SOB, syncope.  Some stable GOODWIN.   He is tolerating oral anticoagulation without issues.

## 2021-02-09 NOTE — DISCUSSION/SUMMARY
[FreeTextEntry1] : EKG:SB,IVCD-LBBB(no change)\par ECHO:Normal LVEF,mild AR/MR/TR/MA(no sig change)\par reviewed labs done Oct; LDL at goal\par continue lipitor for lipids, Xarelto for AF- given hx of bleeding PUD will not start  ASA(he states he never took asa even post CABG)\par discussed echo findings ( and  prior studies) with him\par reviewed recent albs. LDL=50mg%\par at target given hx cAD- no asa for CAD as above, continue lipitor for lipids- Xarelto for af\par extensive time spent including chart/record review of previous procedures/reports

## 2021-02-09 NOTE — DISCUSSION/SUMMARY
[FreeTextEntry1] : 78 y/o man with history of lung Ca s/p R mid lobe resection (in remission as per patient), CAD s/p CABG at [facility] 3/27/18 (LIMA-LAD, ALY-RI, SVG-PDA) who was admitted to [facility] 4/2018 with afib/aflutter s/p cardioversion and ablation 2/2020; who presents for follow up.  he is in normal sinus rhythm today with no symptoms associated with recurrent arrhythmia.  He is maintained on oral anticoagulation.   He will follow up in 405 months or sooner if needed.  He knows to call with any questions or concerns.

## 2021-04-21 ENCOUNTER — APPOINTMENT (OUTPATIENT)
Dept: OPHTHALMOLOGY | Facility: CLINIC | Age: 81
End: 2021-04-21
Payer: MEDICARE

## 2021-04-21 ENCOUNTER — NON-APPOINTMENT (OUTPATIENT)
Age: 81
End: 2021-04-21

## 2021-04-21 PROCEDURE — 99072 ADDL SUPL MATRL&STAF TM PHE: CPT

## 2021-04-21 PROCEDURE — 92014 COMPRE OPH EXAM EST PT 1/>: CPT

## 2021-04-28 ENCOUNTER — NON-APPOINTMENT (OUTPATIENT)
Age: 81
End: 2021-04-28

## 2021-04-28 ENCOUNTER — APPOINTMENT (OUTPATIENT)
Dept: INTERNAL MEDICINE | Facility: CLINIC | Age: 81
End: 2021-04-28
Payer: MEDICARE

## 2021-04-28 VITALS
DIASTOLIC BLOOD PRESSURE: 72 MMHG | WEIGHT: 155 LBS | TEMPERATURE: 97.8 F | OXYGEN SATURATION: 100 % | HEIGHT: 71 IN | SYSTOLIC BLOOD PRESSURE: 123 MMHG | BODY MASS INDEX: 21.7 KG/M2 | HEART RATE: 51 BPM

## 2021-04-28 DIAGNOSIS — R00.1 BRADYCARDIA, UNSPECIFIED: ICD-10-CM

## 2021-04-28 DIAGNOSIS — R35.0 FREQUENCY OF MICTURITION: ICD-10-CM

## 2021-04-28 PROCEDURE — 99213 OFFICE O/P EST LOW 20 MIN: CPT

## 2021-04-28 PROCEDURE — 99072 ADDL SUPL MATRL&STAF TM PHE: CPT

## 2021-04-28 NOTE — HISTORY OF PRESENT ILLNESS
[FreeTextEntry1] : Some dizziness at times \par Takes BP at home ; \par Noted erratic heart beat on digital BP machine [de-identified] : Patient does history of Atrial Fib;\par Has been walking however without dizziness;\par Will see Dr. Banuelos in a few weeks

## 2021-04-28 NOTE — ASSESSMENT
[FreeTextEntry1] : Will repeat labs;\par Also EKG with Sinus Bradycardia; Will have him see Dr. Banuelos sooner;\par Could the eye drops be causing the bradycardia

## 2021-04-29 LAB
ALBUMIN SERPL ELPH-MCNC: 4.3 G/DL
ALP BLD-CCNC: 96 U/L
ALT SERPL-CCNC: 31 U/L
ANION GAP SERPL CALC-SCNC: 10 MMOL/L
AST SERPL-CCNC: 37 U/L
BASOPHILS # BLD AUTO: 0.03 K/UL
BASOPHILS NFR BLD AUTO: 0.6 %
BILIRUB SERPL-MCNC: 1 MG/DL
BUN SERPL-MCNC: 15 MG/DL
CALCIUM SERPL-MCNC: 9.7 MG/DL
CHLORIDE SERPL-SCNC: 105 MMOL/L
CO2 SERPL-SCNC: 26 MMOL/L
CREAT SERPL-MCNC: 0.94 MG/DL
EOSINOPHIL # BLD AUTO: 0.1 K/UL
EOSINOPHIL NFR BLD AUTO: 2 %
ESTIMATED AVERAGE GLUCOSE: 120 MG/DL
GLUCOSE SERPL-MCNC: 72 MG/DL
HBA1C MFR BLD HPLC: 5.8 %
HCT VFR BLD CALC: 37.6 %
HGB BLD-MCNC: 11.7 G/DL
IMM GRANULOCYTES NFR BLD AUTO: 0.4 %
LYMPHOCYTES # BLD AUTO: 1.15 K/UL
LYMPHOCYTES NFR BLD AUTO: 22.5 %
MAN DIFF?: NORMAL
MCHC RBC-ENTMCNC: 31 PG
MCHC RBC-ENTMCNC: 31.1 GM/DL
MCV RBC AUTO: 99.5 FL
MONOCYTES # BLD AUTO: 0.59 K/UL
MONOCYTES NFR BLD AUTO: 11.5 %
NEUTROPHILS # BLD AUTO: 3.23 K/UL
NEUTROPHILS NFR BLD AUTO: 63 %
PLATELET # BLD AUTO: 188 K/UL
POTASSIUM SERPL-SCNC: 5.1 MMOL/L
PROT SERPL-MCNC: 6.8 G/DL
RBC # BLD: 3.78 M/UL
RBC # FLD: 13.5 %
SODIUM SERPL-SCNC: 141 MMOL/L
T4 FREE SERPL-MCNC: 1.3 NG/DL
TSH SERPL-ACNC: 2.79 UIU/ML
WBC # FLD AUTO: 5.12 K/UL

## 2021-05-03 ENCOUNTER — APPOINTMENT (OUTPATIENT)
Dept: HEART AND VASCULAR | Facility: CLINIC | Age: 81
End: 2021-05-03
Payer: MEDICARE

## 2021-05-03 ENCOUNTER — NON-APPOINTMENT (OUTPATIENT)
Age: 81
End: 2021-05-03

## 2021-05-03 VITALS
DIASTOLIC BLOOD PRESSURE: 76 MMHG | WEIGHT: 156 LBS | TEMPERATURE: 97.6 F | HEART RATE: 51 BPM | HEIGHT: 71 IN | BODY MASS INDEX: 21.84 KG/M2 | SYSTOLIC BLOOD PRESSURE: 136 MMHG

## 2021-05-03 VITALS — SYSTOLIC BLOOD PRESSURE: 118 MMHG | DIASTOLIC BLOOD PRESSURE: 60 MMHG

## 2021-05-03 VITALS — DIASTOLIC BLOOD PRESSURE: 72 MMHG | SYSTOLIC BLOOD PRESSURE: 148 MMHG

## 2021-05-03 PROCEDURE — 99072 ADDL SUPL MATRL&STAF TM PHE: CPT

## 2021-05-03 PROCEDURE — 93000 ELECTROCARDIOGRAM COMPLETE: CPT

## 2021-05-03 PROCEDURE — 99214 OFFICE O/P EST MOD 30 MIN: CPT | Mod: 25

## 2021-05-03 RX ORDER — TIMOLOL MALEATE 5 MG/ML
0.5 SOLUTION/ DROPS OPHTHALMIC
Refills: 0 | Status: DISCONTINUED | COMMUNITY
End: 2021-05-03

## 2021-05-03 RX ORDER — DORZOLAMIDE HYDROCHLORIDE TIMOLOL MALEATE 20; 5 MG/ML; MG/ML
22.3-6.8 SOLUTION/ DROPS OPHTHALMIC
Refills: 0 | Status: ACTIVE | COMMUNITY

## 2021-05-03 NOTE — REVIEW OF SYSTEMS
[Visual Disturbances] : visual disturbances [SOB] : shortness of breath [Cough] : cough [Nocturia] : nocturia [Dizziness] : dizziness [Negative] : Heme/Lymph [Wheezing] : no wheezing [Coughing Up Blood] : no hemoptysis [Hematuria] : no hematuria [Tremor] : no tremor was seen [Weakness] : no weakness

## 2021-05-03 NOTE — DISCUSSION/SUMMARY
[FreeTextEntry1] : EKG:SB,IVCD-LBBB(no change)\par markedly orthostatic- advised to increase salt/fluid intake-will get event recorder for SB/lightheadedness; cont Xarelto for PAF;lipitor for lipids- mildly anemic- f/u with Dr Goode

## 2021-06-30 NOTE — ASSESSMENT
Lab results reviewed and abnormals discussed with physician.   [FreeTextEntry1] : BPH \par continue alfuzosin\par \par ED\par sildenafil 100\par \par f/u 6 months

## 2021-07-20 ENCOUNTER — APPOINTMENT (OUTPATIENT)
Dept: UROLOGY | Facility: CLINIC | Age: 81
End: 2021-07-20
Payer: MEDICARE

## 2021-07-20 VITALS — DIASTOLIC BLOOD PRESSURE: 69 MMHG | HEART RATE: 50 BPM | SYSTOLIC BLOOD PRESSURE: 122 MMHG | TEMPERATURE: 98 F

## 2021-07-20 PROCEDURE — 51798 US URINE CAPACITY MEASURE: CPT

## 2021-07-20 PROCEDURE — 99072 ADDL SUPL MATRL&STAF TM PHE: CPT

## 2021-07-20 PROCEDURE — 99213 OFFICE O/P EST LOW 20 MIN: CPT

## 2021-07-20 NOTE — ASSESSMENT
[FreeTextEntry1] : BPH\par continue alfuzosin\par UA UCX \par start finasteride\par PVR to r/o retetnion: 174cc\par f/u 1 year

## 2021-07-20 NOTE — HISTORY OF PRESENT ILLNESS
[FreeTextEntry1] : returns for follow up\par "I am managing it"\par on uroxatral\par incomplete empty\par +hesitancy frequency urgency\par mild bother\par no intrest in procedural threapy

## 2021-07-21 LAB
APPEARANCE: CLEAR
BACTERIA: NEGATIVE
BILIRUBIN URINE: NEGATIVE
BLOOD URINE: NEGATIVE
COLOR: NORMAL
GLUCOSE QUALITATIVE U: NEGATIVE
HYALINE CASTS: 1 /LPF
KETONES URINE: NEGATIVE
LEUKOCYTE ESTERASE URINE: NEGATIVE
MICROSCOPIC-UA: NORMAL
NITRITE URINE: NEGATIVE
PH URINE: 7
PROTEIN URINE: NEGATIVE
RED BLOOD CELLS URINE: 1 /HPF
SPECIFIC GRAVITY URINE: 1.01
SQUAMOUS EPITHELIAL CELLS: 0 /HPF
UROBILINOGEN URINE: NORMAL
WHITE BLOOD CELLS URINE: 0 /HPF

## 2021-07-22 LAB — BACTERIA UR CULT: NORMAL

## 2021-08-24 ENCOUNTER — NON-APPOINTMENT (OUTPATIENT)
Age: 81
End: 2021-08-24

## 2021-08-24 ENCOUNTER — APPOINTMENT (OUTPATIENT)
Dept: HEART AND VASCULAR | Facility: CLINIC | Age: 81
End: 2021-08-24
Payer: MEDICARE

## 2021-08-24 VITALS
WEIGHT: 156 LBS | SYSTOLIC BLOOD PRESSURE: 130 MMHG | HEART RATE: 54 BPM | BODY MASS INDEX: 21.84 KG/M2 | DIASTOLIC BLOOD PRESSURE: 65 MMHG | TEMPERATURE: 98 F | HEIGHT: 71 IN

## 2021-08-24 DIAGNOSIS — R06.02 SHORTNESS OF BREATH: ICD-10-CM

## 2021-08-24 PROCEDURE — 99214 OFFICE O/P EST MOD 30 MIN: CPT | Mod: 25

## 2021-08-24 PROCEDURE — 93000 ELECTROCARDIOGRAM COMPLETE: CPT

## 2021-08-24 NOTE — DISCUSSION/SUMMARY
[FreeTextEntry1] : EKG:SB,IVCD-LBBB,first degree AVB\par neuro f/u for balance/lightheadedness( do not feel cardiac)- reviewed monitor results with pt- continue lipitor for lipids/CAD, Xarelto for PAF\par RV 4 months for PAF/CAD

## 2021-08-24 NOTE — PHYSICAL EXAM
[No Rub] : no rub [Soft] : abdomen soft [Non Tender] : non-tender [Normal] : alert and oriented, normal memory [de-identified] : soft systolic murmur

## 2021-08-24 NOTE — REVIEW OF SYSTEMS
[Visual Disturbances] : visual disturbances [Dyspnea on exertion] : dyspnea during exertion [Cough] : cough [Nocturia] : nocturia [Dizziness] : dizziness [Negative] : Heme/Lymph [SOB] : no shortness of breath [Chest Discomfort] : no chest discomfort [Lower Ext Edema] : no extremity edema [Leg Claudication] : no intermittent leg claudication [Palpitations] : no palpitations [Orthopnea] : no orthopnea [PND] : no PND [Syncope] : no syncope [Wheezing] : no wheezing [Coughing Up Blood] : no hemoptysis [Hematuria] : no hematuria [Tremor] : no tremor was seen [Weakness] : no weakness [de-identified] : see HPI

## 2021-08-24 NOTE — HISTORY OF PRESENT ILLNESS
[FreeTextEntry1] : no cp,has occasional sob- still with periodic lightheadedness/baalnce issues- has seen neuro in past and dx with Myasthenia gravis. states sob was dx as due to diaphragmatic problem post CABG

## 2021-08-25 ENCOUNTER — APPOINTMENT (OUTPATIENT)
Dept: OPHTHALMOLOGY | Facility: CLINIC | Age: 81
End: 2021-08-25
Payer: MEDICARE

## 2021-08-25 ENCOUNTER — NON-APPOINTMENT (OUTPATIENT)
Age: 81
End: 2021-08-25

## 2021-08-25 PROCEDURE — 92014 COMPRE OPH EXAM EST PT 1/>: CPT

## 2021-09-03 ENCOUNTER — APPOINTMENT (OUTPATIENT)
Dept: INTERNAL MEDICINE | Facility: CLINIC | Age: 81
End: 2021-09-03
Payer: MEDICARE

## 2021-09-03 VITALS
WEIGHT: 156 LBS | HEIGHT: 71 IN | SYSTOLIC BLOOD PRESSURE: 123 MMHG | OXYGEN SATURATION: 100 % | DIASTOLIC BLOOD PRESSURE: 73 MMHG | BODY MASS INDEX: 21.84 KG/M2 | TEMPERATURE: 97.7 F | HEART RATE: 54 BPM | RESPIRATION RATE: 14 BRPM

## 2021-09-03 DIAGNOSIS — I95.1 ORTHOSTATIC HYPOTENSION: ICD-10-CM

## 2021-09-03 PROCEDURE — 36415 COLL VENOUS BLD VENIPUNCTURE: CPT

## 2021-09-03 PROCEDURE — 99213 OFFICE O/P EST LOW 20 MIN: CPT | Mod: 25

## 2021-09-03 NOTE — ASSESSMENT
[FreeTextEntry1] : Lightheadedness maybe secondary to BPH meds; Will discuss with Dr Torres;\par All labs today

## 2021-09-03 NOTE — HEALTH RISK ASSESSMENT
[No] : No [No falls in past year] : Patient reported no falls in the past year [0] : 2) Feeling down, depressed, or hopeless: Not at all (0) [] : No [[id number]] : 0

## 2021-09-03 NOTE — HISTORY OF PRESENT ILLNESS
[FreeTextEntry1] : Interim reviewed;\par Feels lightheadedness  at times; Balance off at times\par It could be related to Alfuzosin  [de-identified] : Will discuss with Dr Torres above; PVR increased\par The BPH meds could be causing the hypotension

## 2021-09-05 ENCOUNTER — RX RENEWAL (OUTPATIENT)
Age: 81
End: 2021-09-05

## 2021-09-08 LAB
25(OH)D3 SERPL-MCNC: 52 NG/ML
ALBUMIN SERPL ELPH-MCNC: 4.6 G/DL
ALP BLD-CCNC: 99 U/L
ALT SERPL-CCNC: 24 U/L
ANION GAP SERPL CALC-SCNC: 12 MMOL/L
APPEARANCE: CLEAR
AST SERPL-CCNC: 35 U/L
BACTERIA: NEGATIVE
BASOPHILS # BLD AUTO: 0.04 K/UL
BASOPHILS NFR BLD AUTO: 0.7 %
BILIRUB SERPL-MCNC: 1.3 MG/DL
BILIRUBIN URINE: NEGATIVE
BLOOD URINE: NEGATIVE
BUN SERPL-MCNC: 14 MG/DL
CALCIUM SERPL-MCNC: 9.9 MG/DL
CHLORIDE SERPL-SCNC: 105 MMOL/L
CHOLEST SERPL-MCNC: 144 MG/DL
CO2 SERPL-SCNC: 25 MMOL/L
COLOR: YELLOW
CREAT SERPL-MCNC: 0.91 MG/DL
EOSINOPHIL # BLD AUTO: 0.13 K/UL
EOSINOPHIL NFR BLD AUTO: 2.4 %
ESTIMATED AVERAGE GLUCOSE: 126 MG/DL
GLUCOSE QUALITATIVE U: NEGATIVE
GLUCOSE SERPL-MCNC: 65 MG/DL
HBA1C MFR BLD HPLC: 6 %
HCT VFR BLD CALC: 40.6 %
HDLC SERPL-MCNC: 85 MG/DL
HGB BLD-MCNC: 12.6 G/DL
HYALINE CASTS: 4 /LPF
IMM GRANULOCYTES NFR BLD AUTO: 0.4 %
KETONES URINE: NEGATIVE
LDLC SERPL CALC-MCNC: 52 MG/DL
LEUKOCYTE ESTERASE URINE: NEGATIVE
LYMPHOCYTES # BLD AUTO: 1.06 K/UL
LYMPHOCYTES NFR BLD AUTO: 19.7 %
MAN DIFF?: NORMAL
MCHC RBC-ENTMCNC: 31 GM/DL
MCHC RBC-ENTMCNC: 31 PG
MCV RBC AUTO: 100 FL
MICROSCOPIC-UA: NORMAL
MONOCYTES # BLD AUTO: 0.59 K/UL
MONOCYTES NFR BLD AUTO: 10.9 %
NEUTROPHILS # BLD AUTO: 3.55 K/UL
NEUTROPHILS NFR BLD AUTO: 65.9 %
NITRITE URINE: NEGATIVE
NONHDLC SERPL-MCNC: 59 MG/DL
PH URINE: 7
PLATELET # BLD AUTO: 182 K/UL
POTASSIUM SERPL-SCNC: 4.9 MMOL/L
PROT SERPL-MCNC: 6.9 G/DL
PROTEIN URINE: NEGATIVE
PSA SERPL-MCNC: 0.54 NG/ML
RBC # BLD: 4.06 M/UL
RBC # FLD: 13.4 %
RED BLOOD CELLS URINE: 2 /HPF
SODIUM SERPL-SCNC: 142 MMOL/L
SPECIFIC GRAVITY URINE: 1.01
SQUAMOUS EPITHELIAL CELLS: 1 /HPF
T4 FREE SERPL-MCNC: 1.4 NG/DL
TRIGL SERPL-MCNC: 35 MG/DL
TSH SERPL-ACNC: 2.78 UIU/ML
UROBILINOGEN URINE: NORMAL
WBC # FLD AUTO: 5.39 K/UL
WHITE BLOOD CELLS URINE: 1 /HPF

## 2021-10-26 ENCOUNTER — APPOINTMENT (OUTPATIENT)
Dept: INTERNAL MEDICINE | Facility: CLINIC | Age: 81
End: 2021-10-26
Payer: MEDICARE

## 2021-10-26 VITALS
HEART RATE: 55 BPM | SYSTOLIC BLOOD PRESSURE: 143 MMHG | HEIGHT: 71 IN | DIASTOLIC BLOOD PRESSURE: 82 MMHG | RESPIRATION RATE: 16 BRPM | TEMPERATURE: 97.6 F | OXYGEN SATURATION: 100 %

## 2021-10-26 PROCEDURE — 99213 OFFICE O/P EST LOW 20 MIN: CPT

## 2021-10-26 NOTE — HISTORY OF PRESENT ILLNESS
[FreeTextEntry8] : Dry cough for a week\par Had Covid test recently and was negative\par Cough worse at night; \par No change in medication\par No fever \par Finasteride helping ?\par MAybe improving the cough\par

## 2021-10-26 NOTE — PHYSICAL EXAM
[No Respiratory Distress] : no respiratory distress  [No Accessory Muscle Use] : no accessory muscle use [Clear to Auscultation] : lungs were clear to auscultation bilaterally [Normal Percussion] : the chest was normal to percussion [de-identified] : Throat injected

## 2021-12-14 ENCOUNTER — NON-APPOINTMENT (OUTPATIENT)
Age: 81
End: 2021-12-14

## 2021-12-14 ENCOUNTER — APPOINTMENT (OUTPATIENT)
Dept: HEART AND VASCULAR | Facility: CLINIC | Age: 81
End: 2021-12-14
Payer: MEDICARE

## 2021-12-14 VITALS
WEIGHT: 155 LBS | OXYGEN SATURATION: 95 % | BODY MASS INDEX: 21.7 KG/M2 | HEART RATE: 58 BPM | SYSTOLIC BLOOD PRESSURE: 135 MMHG | HEIGHT: 71 IN | DIASTOLIC BLOOD PRESSURE: 71 MMHG | TEMPERATURE: 97.4 F

## 2021-12-14 PROCEDURE — 99214 OFFICE O/P EST MOD 30 MIN: CPT | Mod: 25

## 2021-12-14 PROCEDURE — 93000 ELECTROCARDIOGRAM COMPLETE: CPT

## 2021-12-14 NOTE — PHYSICAL EXAM
[No Rub] : no rub [Soft] : abdomen soft [Non Tender] : non-tender [No Edema] : no edema [Normal] : moves all extremities, no focal deficits, normal speech [Alert and Oriented] : alert and oriented [de-identified] : soft systolic murmur

## 2021-12-14 NOTE — DISCUSSION/SUMMARY
[FreeTextEntry1] : EKG:SB\par would restart  EASA for CAD but as hx PUD and on DOAC will hold for now ;lipitor for lipids; Xarelto for PAF\par reviewed albs done 3 months ago: LDL at goal (<70mg%)

## 2021-12-22 ENCOUNTER — APPOINTMENT (OUTPATIENT)
Dept: OPHTHALMOLOGY | Facility: CLINIC | Age: 81
End: 2021-12-22
Payer: MEDICARE

## 2021-12-22 ENCOUNTER — NON-APPOINTMENT (OUTPATIENT)
Age: 81
End: 2021-12-22

## 2021-12-22 PROCEDURE — 92014 COMPRE OPH EXAM EST PT 1/>: CPT

## 2022-01-05 ENCOUNTER — APPOINTMENT (OUTPATIENT)
Dept: OTOLARYNGOLOGY | Facility: CLINIC | Age: 82
End: 2022-01-05
Payer: MEDICARE

## 2022-01-05 PROCEDURE — 69210 REMOVE IMPACTED EAR WAX UNI: CPT

## 2022-01-05 PROCEDURE — 99212 OFFICE O/P EST SF 10 MIN: CPT | Mod: 25

## 2022-01-05 RX ORDER — ALFUZOSIN HYDROCHLORIDE 10 MG/1
10 TABLET, EXTENDED RELEASE ORAL
Qty: 90 | Refills: 3 | Status: DISCONTINUED | COMMUNITY
Start: 2019-06-17 | End: 2022-01-05

## 2022-01-05 NOTE — HISTORY OF PRESENT ILLNESS
[de-identified] : JONNA GOODE is a 81 year man with a history of cerumen impaction. he s clogged. he has afib and is using Xarelto.

## 2022-02-01 ENCOUNTER — APPOINTMENT (OUTPATIENT)
Dept: INTERNAL MEDICINE | Facility: CLINIC | Age: 82
End: 2022-02-01
Payer: COMMERCIAL

## 2022-02-01 VITALS
OXYGEN SATURATION: 100 % | HEART RATE: 58 BPM | TEMPERATURE: 97.8 F | RESPIRATION RATE: 14 BRPM | SYSTOLIC BLOOD PRESSURE: 134 MMHG | BODY MASS INDEX: 29.27 KG/M2 | DIASTOLIC BLOOD PRESSURE: 78 MMHG | HEIGHT: 61 IN | WEIGHT: 155 LBS

## 2022-02-01 PROCEDURE — 99213 OFFICE O/P EST LOW 20 MIN: CPT

## 2022-02-01 NOTE — HISTORY OF PRESENT ILLNESS
[FreeTextEntry1] : Patient with constipation; \par Using Prune choice with some relief [de-identified] : As above will try lactulose\par No infectious problems\par Walking and no shortness of breath

## 2022-02-01 NOTE — HEALTH RISK ASSESSMENT
[Former] : Former [No] : No [No falls in past year] : Patient reported no falls in the past year [0] : 2) Feeling down, depressed, or hopeless: Not at all (0) [[id number]] : 0

## 2022-04-11 NOTE — PHYSICAL THERAPY INITIAL EVALUATION ADULT - NAME OF CLINICIAN
Continue rocephin and await final urine cultures  4/9 continue IV antibiotics    cont abxs     YARY Trammell

## 2022-04-12 ENCOUNTER — APPOINTMENT (OUTPATIENT)
Dept: HEART AND VASCULAR | Facility: CLINIC | Age: 82
End: 2022-04-12
Payer: MEDICARE

## 2022-04-12 VITALS
HEART RATE: 71 BPM | TEMPERATURE: 97.8 F | HEIGHT: 71 IN | DIASTOLIC BLOOD PRESSURE: 70 MMHG | OXYGEN SATURATION: 99 % | SYSTOLIC BLOOD PRESSURE: 142 MMHG | BODY MASS INDEX: 21.14 KG/M2 | WEIGHT: 151 LBS

## 2022-04-12 VITALS — SYSTOLIC BLOOD PRESSURE: 136 MMHG | DIASTOLIC BLOOD PRESSURE: 70 MMHG

## 2022-04-12 DIAGNOSIS — I34.0 NONRHEUMATIC MITRAL (VALVE) INSUFFICIENCY: ICD-10-CM

## 2022-04-12 PROCEDURE — 93000 ELECTROCARDIOGRAM COMPLETE: CPT

## 2022-04-12 PROCEDURE — 93306 TTE W/DOPPLER COMPLETE: CPT

## 2022-04-12 PROCEDURE — 99214 OFFICE O/P EST MOD 30 MIN: CPT | Mod: 25

## 2022-04-12 RX ORDER — AZITHROMYCIN 250 MG/1
250 TABLET, FILM COATED ORAL
Qty: 6 | Refills: 1 | Status: DISCONTINUED | COMMUNITY
Start: 2021-10-26 | End: 2022-04-12

## 2022-04-12 RX ORDER — LACTULOSE 10 G/15ML
20 SOLUTION ORAL
Qty: 120 | Refills: 3 | Status: DISCONTINUED | COMMUNITY
Start: 2022-02-01 | End: 2022-04-12

## 2022-04-12 NOTE — PHYSICAL EXAM
[No Rub] : no rub [Soft] : abdomen soft [Non Tender] : non-tender [No Edema] : no edema [Normal] : moves all extremities, no focal deficits, normal speech [Alert and Oriented] : alert and oriented [de-identified] : soft systolic murmur

## 2022-04-12 NOTE — DISCUSSION/SUMMARY
[FreeTextEntry1] : EKG:NSR,IVCD-LBBB(no significant change)\par ECHO:normal LVEf,mild AR/MR\par continue Crestor for lipids/CAD; Xarelto for PAF/CAD- would start lo dose asapirin

## 2022-04-12 NOTE — REASON FOR VISIT
[Coronary Artery Disease] : coronary artery disease [Arrhythmia/ECG Abnorrmalities] : arrhythmia/ECG abnormalities

## 2022-04-20 ENCOUNTER — NON-APPOINTMENT (OUTPATIENT)
Age: 82
End: 2022-04-20

## 2022-04-20 ENCOUNTER — APPOINTMENT (OUTPATIENT)
Dept: OPHTHALMOLOGY | Facility: CLINIC | Age: 82
End: 2022-04-20
Payer: MEDICARE

## 2022-04-20 PROCEDURE — 92012 INTRM OPH EXAM EST PATIENT: CPT

## 2022-05-03 ENCOUNTER — APPOINTMENT (OUTPATIENT)
Dept: INTERNAL MEDICINE | Facility: CLINIC | Age: 82
End: 2022-05-03
Payer: MEDICARE

## 2022-05-03 VITALS
RESPIRATION RATE: 12 BRPM | BODY MASS INDEX: 21.14 KG/M2 | OXYGEN SATURATION: 100 % | WEIGHT: 151 LBS | HEART RATE: 58 BPM | DIASTOLIC BLOOD PRESSURE: 72 MMHG | TEMPERATURE: 97.5 F | HEIGHT: 71 IN | SYSTOLIC BLOOD PRESSURE: 148 MMHG

## 2022-05-03 DIAGNOSIS — Z87.19 PERSONAL HISTORY OF OTHER DISEASES OF THE DIGESTIVE SYSTEM: ICD-10-CM

## 2022-05-03 DIAGNOSIS — K59.00 CONSTIPATION, UNSPECIFIED: ICD-10-CM

## 2022-05-03 PROCEDURE — 99213 OFFICE O/P EST LOW 20 MIN: CPT

## 2022-05-03 NOTE — ASSESSMENT
[FreeTextEntry1] : Stable examination except for constipation;\par ill have patient try cola guard\par Also Senna tablets at night \par RTC 3 months

## 2022-05-03 NOTE — HISTORY OF PRESENT ILLNESS
[FreeTextEntry1] : Constipation still a problem;\par May not have bowel movements for 3 days;  [de-identified] : All medication without change;\par Aspirin added; 81 mg\par Exercise class twice a week

## 2022-05-10 ENCOUNTER — TRANSCRIPTION ENCOUNTER (OUTPATIENT)
Age: 82
End: 2022-05-10

## 2022-05-16 ENCOUNTER — TRANSCRIPTION ENCOUNTER (OUTPATIENT)
Age: 82
End: 2022-05-16

## 2022-07-19 ENCOUNTER — APPOINTMENT (OUTPATIENT)
Dept: UROLOGY | Facility: CLINIC | Age: 82
End: 2022-07-19

## 2022-07-19 VITALS
HEART RATE: 57 BPM | TEMPERATURE: 98 F | SYSTOLIC BLOOD PRESSURE: 147 MMHG | DIASTOLIC BLOOD PRESSURE: 77 MMHG | BODY MASS INDEX: 21.7 KG/M2 | HEIGHT: 71 IN | WEIGHT: 155 LBS

## 2022-07-19 PROCEDURE — 99213 OFFICE O/P EST LOW 20 MIN: CPT | Mod: 25

## 2022-07-19 PROCEDURE — 51798 US URINE CAPACITY MEASURE: CPT

## 2022-07-19 NOTE — HISTORY OF PRESENT ILLNESS
[FreeTextEntry1] : returns for follow up\par "I am managing it"\par on uroxatral\par incomplete empty\par +hesitancy frequency urgency\par mild bother\par no intrest in procedural threapy \par \par 7/19/2022:  Returns for follow up for BPH. He has been taking finasteride 5mg daily and is pleased with his urinary symptoms.  Previously discontinued Alfuzosin, he stated he had a side effect from the medication but cannot remember what it was.  He experiences daytime frequency that improves throughout the day.  Nocturia 2x/day that is not bothersome.  FOS "okay, sometimes dribbly".  Feels that he empties when he double voids. States second void is a small volume but he feels empty.  Rare hesitancy, no urgency.

## 2022-07-19 NOTE — ASSESSMENT
[FreeTextEntry1] : Assessment:  \par JONNA GOODE is a 81 year M who presents for follow up for BPH on finasteride.  Still has some daytime frequency, nocturia, and needs to double void but is overall happy with his symptoms. Not interested in procedure \par \par PVR 281cc to r/o retention today. \par \par Plan:\par -Continue finasteride\par UA UCX\par -Follow up 1 year

## 2022-08-02 ENCOUNTER — APPOINTMENT (OUTPATIENT)
Dept: HEART AND VASCULAR | Facility: CLINIC | Age: 82
End: 2022-08-02

## 2022-08-02 ENCOUNTER — NON-APPOINTMENT (OUTPATIENT)
Age: 82
End: 2022-08-02

## 2022-08-02 ENCOUNTER — APPOINTMENT (OUTPATIENT)
Dept: INTERNAL MEDICINE | Facility: CLINIC | Age: 82
End: 2022-08-02

## 2022-08-02 VITALS
HEIGHT: 71 IN | RESPIRATION RATE: 14 BRPM | SYSTOLIC BLOOD PRESSURE: 143 MMHG | WEIGHT: 155 LBS | TEMPERATURE: 98.1 F | HEART RATE: 59 BPM | OXYGEN SATURATION: 100 % | BODY MASS INDEX: 21.7 KG/M2 | DIASTOLIC BLOOD PRESSURE: 79 MMHG

## 2022-08-02 VITALS
WEIGHT: 155 LBS | OXYGEN SATURATION: 97 % | BODY MASS INDEX: 21.7 KG/M2 | HEART RATE: 60 BPM | HEIGHT: 71 IN | SYSTOLIC BLOOD PRESSURE: 144 MMHG | DIASTOLIC BLOOD PRESSURE: 77 MMHG | TEMPERATURE: 97.6 F

## 2022-08-02 VITALS — SYSTOLIC BLOOD PRESSURE: 122 MMHG | DIASTOLIC BLOOD PRESSURE: 70 MMHG

## 2022-08-02 PROCEDURE — 99213 OFFICE O/P EST LOW 20 MIN: CPT | Mod: 25

## 2022-08-02 PROCEDURE — 93000 ELECTROCARDIOGRAM COMPLETE: CPT

## 2022-08-02 PROCEDURE — 36415 COLL VENOUS BLD VENIPUNCTURE: CPT

## 2022-08-02 PROCEDURE — 99214 OFFICE O/P EST MOD 30 MIN: CPT | Mod: 25

## 2022-08-02 NOTE — PHYSICAL EXAM
[No Rub] : no rub [Soft] : abdomen soft [Non Tender] : non-tender [No Edema] : no edema [Normal] : moves all extremities, no focal deficits, normal speech [Alert and Oriented] : alert and oriented [de-identified] : soft systolic murmur

## 2022-08-02 NOTE — DISCUSSION/SUMMARY
[FreeTextEntry1] : EKG:NSR,IVCD-LBBB(no change)\par continue lipitor for lipids; Xarelto for AF;asa for CAD

## 2022-08-02 NOTE — HEALTH RISK ASSESSMENT
[Never] : Never [No] : No [No falls in past year] : Patient reported no falls in the past year [0] : 2) Feeling down, depressed, or hopeless: Not at all (0) [[id number]] : 0

## 2022-08-02 NOTE — HISTORY OF PRESENT ILLNESS
[FreeTextEntry1] : urology follow up noted; \par Does not enter bladder fully  and procedure suggested \par Not much urinary frequency \par  Nocturia times 2  [de-identified] : [place] guard  negative; \par Exercise\par Has glaucoma right eye

## 2022-08-04 LAB
25(OH)D3 SERPL-MCNC: 50.7 NG/ML
ALBUMIN SERPL ELPH-MCNC: 4.5 G/DL
ALP BLD-CCNC: 91 U/L
ALT SERPL-CCNC: 21 U/L
ANION GAP SERPL CALC-SCNC: 15 MMOL/L
APPEARANCE: CLEAR
AST SERPL-CCNC: 35 U/L
BACTERIA: NEGATIVE
BASOPHILS # BLD AUTO: 0.03 K/UL
BASOPHILS NFR BLD AUTO: 0.5 %
BILIRUB SERPL-MCNC: 1.1 MG/DL
BILIRUBIN URINE: NEGATIVE
BLOOD URINE: NEGATIVE
BUN SERPL-MCNC: 15 MG/DL
CALCIUM SERPL-MCNC: 10 MG/DL
CHLORIDE SERPL-SCNC: 105 MMOL/L
CHOLEST SERPL-MCNC: 159 MG/DL
CO2 SERPL-SCNC: 25 MMOL/L
COLOR: NORMAL
CREAT SERPL-MCNC: 0.9 MG/DL
EGFR: 87 ML/MIN/1.73M2
EOSINOPHIL # BLD AUTO: 0.08 K/UL
EOSINOPHIL NFR BLD AUTO: 1.3 %
ESTIMATED AVERAGE GLUCOSE: 126 MG/DL
GLUCOSE QUALITATIVE U: NEGATIVE
GLUCOSE SERPL-MCNC: 62 MG/DL
HBA1C MFR BLD HPLC: 6 %
HCT VFR BLD CALC: 41.1 %
HDLC SERPL-MCNC: 87 MG/DL
HGB BLD-MCNC: 12.7 G/DL
HYALINE CASTS: 0 /LPF
IMM GRANULOCYTES NFR BLD AUTO: 0.2 %
KETONES URINE: NEGATIVE
LDLC SERPL CALC-MCNC: 65 MG/DL
LEUKOCYTE ESTERASE URINE: NEGATIVE
LYMPHOCYTES # BLD AUTO: 1.27 K/UL
LYMPHOCYTES NFR BLD AUTO: 21.3 %
MAN DIFF?: NORMAL
MCHC RBC-ENTMCNC: 30.9 GM/DL
MCHC RBC-ENTMCNC: 31 PG
MCV RBC AUTO: 100.2 FL
MICROSCOPIC-UA: NORMAL
MONOCYTES # BLD AUTO: 0.69 K/UL
MONOCYTES NFR BLD AUTO: 11.6 %
NEUTROPHILS # BLD AUTO: 3.88 K/UL
NEUTROPHILS NFR BLD AUTO: 65.1 %
NITRITE URINE: NEGATIVE
NONHDLC SERPL-MCNC: 73 MG/DL
PH URINE: 7
PLATELET # BLD AUTO: 206 K/UL
POTASSIUM SERPL-SCNC: 4.8 MMOL/L
PROT SERPL-MCNC: 6.9 G/DL
PROTEIN URINE: NEGATIVE
PSA SERPL-MCNC: 0.5 NG/ML
RBC # BLD: 4.1 M/UL
RBC # FLD: 12.9 %
RED BLOOD CELLS URINE: 3 /HPF
SODIUM SERPL-SCNC: 144 MMOL/L
SPECIFIC GRAVITY URINE: 1.01
SQUAMOUS EPITHELIAL CELLS: 0 /HPF
T4 FREE SERPL-MCNC: 1.3 NG/DL
TRIGL SERPL-MCNC: 38 MG/DL
TSH SERPL-ACNC: 2.28 UIU/ML
UROBILINOGEN URINE: NORMAL
WBC # FLD AUTO: 5.96 K/UL
WHITE BLOOD CELLS URINE: 0 /HPF

## 2022-08-24 ENCOUNTER — APPOINTMENT (OUTPATIENT)
Dept: OPHTHALMOLOGY | Facility: CLINIC | Age: 82
End: 2022-08-24

## 2022-08-24 ENCOUNTER — NON-APPOINTMENT (OUTPATIENT)
Age: 82
End: 2022-08-24

## 2022-08-24 PROCEDURE — 92012 INTRM OPH EXAM EST PATIENT: CPT

## 2022-08-24 PROCEDURE — 92083 EXTENDED VISUAL FIELD XM: CPT

## 2022-08-24 PROCEDURE — 92133 CPTRZD OPH DX IMG PST SGM ON: CPT

## 2022-09-14 ENCOUNTER — APPOINTMENT (OUTPATIENT)
Dept: INTERNAL MEDICINE | Facility: CLINIC | Age: 82
End: 2022-09-14

## 2022-09-14 VITALS
SYSTOLIC BLOOD PRESSURE: 125 MMHG | DIASTOLIC BLOOD PRESSURE: 77 MMHG | HEART RATE: 58 BPM | OXYGEN SATURATION: 98 % | TEMPERATURE: 98.2 F | HEIGHT: 71 IN | WEIGHT: 155 LBS | BODY MASS INDEX: 21.7 KG/M2 | RESPIRATION RATE: 16 BRPM

## 2022-09-14 DIAGNOSIS — M25.473 EFFUSION, UNSPECIFIED ANKLE: ICD-10-CM

## 2022-09-14 DIAGNOSIS — S90.529S: ICD-10-CM

## 2022-09-14 PROCEDURE — 99213 OFFICE O/P EST LOW 20 MIN: CPT | Mod: 25

## 2022-09-14 PROCEDURE — 36415 COLL VENOUS BLD VENIPUNCTURE: CPT

## 2022-09-14 NOTE — HISTORY OF PRESENT ILLNESS
[FreeTextEntry1] : Left ankle and leg swollen ;\par Has had this problem for a week;\par No shortness of breath;  Always has a cough [de-identified] : Still lightheaded at times;

## 2022-09-15 LAB
BASOPHILS # BLD AUTO: 0.04 K/UL
BASOPHILS NFR BLD AUTO: 0.5 %
EOSINOPHIL # BLD AUTO: 0.24 K/UL
EOSINOPHIL NFR BLD AUTO: 3.3 %
HCT VFR BLD CALC: 40.1 %
HGB BLD-MCNC: 12.5 G/DL
IMM GRANULOCYTES NFR BLD AUTO: 0.3 %
LYMPHOCYTES # BLD AUTO: 1.16 K/UL
LYMPHOCYTES NFR BLD AUTO: 15.9 %
MAN DIFF?: NORMAL
MCHC RBC-ENTMCNC: 31.2 GM/DL
MCHC RBC-ENTMCNC: 31.5 PG
MCV RBC AUTO: 101 FL
MONOCYTES # BLD AUTO: 0.9 K/UL
MONOCYTES NFR BLD AUTO: 12.3 %
NEUTROPHILS # BLD AUTO: 4.94 K/UL
NEUTROPHILS NFR BLD AUTO: 67.7 %
PLATELET # BLD AUTO: 225 K/UL
RBC # BLD: 3.97 M/UL
RBC # FLD: 13.4 %
URATE SERPL-MCNC: 4.9 MG/DL
WBC # FLD AUTO: 7.3 K/UL

## 2022-09-19 ENCOUNTER — NON-APPOINTMENT (OUTPATIENT)
Age: 82
End: 2022-09-19

## 2022-09-19 ENCOUNTER — RESULT REVIEW (OUTPATIENT)
Age: 82
End: 2022-09-19

## 2022-09-19 ENCOUNTER — OUTPATIENT (OUTPATIENT)
Dept: OUTPATIENT SERVICES | Facility: HOSPITAL | Age: 82
LOS: 1 days | End: 2022-09-19
Payer: MEDICARE

## 2022-09-19 DIAGNOSIS — Z98.890 OTHER SPECIFIED POSTPROCEDURAL STATES: Chronic | ICD-10-CM

## 2022-09-19 DIAGNOSIS — Z95.1 PRESENCE OF AORTOCORONARY BYPASS GRAFT: Chronic | ICD-10-CM

## 2022-09-19 DIAGNOSIS — Z86.69 PERSONAL HISTORY OF OTHER DISEASES OF THE NERVOUS SYSTEM AND SENSE ORGANS: Chronic | ICD-10-CM

## 2022-09-19 PROCEDURE — 73610 X-RAY EXAM OF ANKLE: CPT | Mod: 26,LT

## 2022-09-19 PROCEDURE — 73610 X-RAY EXAM OF ANKLE: CPT

## 2022-09-27 ENCOUNTER — RX RENEWAL (OUTPATIENT)
Age: 82
End: 2022-09-27

## 2022-09-29 ENCOUNTER — APPOINTMENT (OUTPATIENT)
Dept: ORTHOPEDIC SURGERY | Facility: CLINIC | Age: 82
End: 2022-09-29

## 2022-11-02 ENCOUNTER — APPOINTMENT (OUTPATIENT)
Dept: INTERNAL MEDICINE | Facility: CLINIC | Age: 82
End: 2022-11-02

## 2022-11-02 VITALS
BODY MASS INDEX: 21.7 KG/M2 | HEIGHT: 71 IN | TEMPERATURE: 97.7 F | SYSTOLIC BLOOD PRESSURE: 133 MMHG | HEART RATE: 71 BPM | OXYGEN SATURATION: 87 % | DIASTOLIC BLOOD PRESSURE: 70 MMHG | WEIGHT: 155 LBS

## 2022-11-02 DIAGNOSIS — K92.2 GASTROINTESTINAL HEMORRHAGE, UNSPECIFIED: ICD-10-CM

## 2022-11-02 DIAGNOSIS — D55.0 ANEMIA DUE TO GLUCOSE-6-PHOSPHATE DEHYDROGENASE [G6PD] DEFICIENCY: ICD-10-CM

## 2022-11-02 PROCEDURE — 99213 OFFICE O/P EST LOW 20 MIN: CPT

## 2022-11-02 NOTE — HEALTH RISK ASSESSMENT
[Never] : Never [No] : No [No falls in past year] : Patient reported no falls in the past year [[facility]] : 0

## 2022-11-02 NOTE — HISTORY OF PRESENT ILLNESS
[FreeTextEntry1] : Left leg problem improved \par Using compression stockings\par Able to walk around with an issue [de-identified] : This was likely hematoma from Xarelto \par all medication the same \par Diet is followed;\par

## 2022-11-08 NOTE — PHYSICAL THERAPY INITIAL EVALUATION ADULT - GROSSLY INTACT, SENSORY
Patient: Erika Archer    Procedure Summary     Date: 11/08/22 Room / Location: [facility] ENDOSCOPY 3 / [facility] ENDOSCOPY    Anesthesia Start: 1112 Anesthesia Stop: 1135    Procedures:       ESOPHAGOGASTRODUODENOSCOPY      COLONOSCOPY Diagnosis:       Epigastric pain      Encounter for screening for malignant neoplasm of colon      (Epigastric pain [R10.13])      (Encounter for screening for malignant neoplasm of colon [Z12.11])    Surgeons: Jeff Cruz MD Provider: Jenny Valdes CRNA    Anesthesia Type: general ASA Status: 3          Anesthesia Type: general    Vitals  No vitals data found for the desired time range.          Post Anesthesia Care and Evaluation    Patient location during evaluation: bedside  Patient participation: waiting for patient participation  Level of consciousness: responsive to verbal stimuli  Pain management: adequate    Airway patency: patent  Anesthetic complications: No anesthetic complications  PONV Status: none  Cardiovascular status: acceptable  Respiratory status: acceptable  Hydration status: acceptable    Comments: ---------------------------               11/08/22                      1010         ---------------------------   BP:          172/81         Pulse:         72           Resp:          16           Temp:   97.7 °F (36.5 °C)   SpO2:          99%         ---------------------------         Grossly Intact

## 2022-11-25 ENCOUNTER — NON-APPOINTMENT (OUTPATIENT)
Age: 82
End: 2022-11-25

## 2022-12-06 ENCOUNTER — APPOINTMENT (OUTPATIENT)
Dept: HEART AND VASCULAR | Facility: CLINIC | Age: 82
End: 2022-12-06

## 2023-01-04 ENCOUNTER — APPOINTMENT (OUTPATIENT)
Dept: OTOLARYNGOLOGY | Facility: CLINIC | Age: 83
End: 2023-01-04
Payer: MEDICARE

## 2023-01-04 VITALS — TEMPERATURE: 96.9 F | BODY MASS INDEX: 21.7 KG/M2 | HEIGHT: 71 IN | WEIGHT: 155 LBS

## 2023-01-04 DIAGNOSIS — K21.9 GASTRO-ESOPHAGEAL REFLUX DISEASE W/OUT ESOPHAGITIS: ICD-10-CM

## 2023-01-04 DIAGNOSIS — H61.23 IMPACTED CERUMEN, BILATERAL: ICD-10-CM

## 2023-01-04 PROCEDURE — 31575 DIAGNOSTIC LARYNGOSCOPY: CPT

## 2023-01-04 PROCEDURE — 99214 OFFICE O/P EST MOD 30 MIN: CPT | Mod: 25

## 2023-01-04 RX ORDER — ASPIRIN 81 MG/1
81 TABLET ORAL
Refills: 0 | Status: DISCONTINUED | COMMUNITY
End: 2023-01-04

## 2023-01-04 NOTE — REVIEW OF SYSTEMS
[Negative] : Heme/Lymph [Patient Intake Form Reviewed] : Patient intake form was reviewed [de-identified] : ear wax build up

## 2023-01-04 NOTE — ASSESSMENT
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 I suggested and discussed in detail a strict reflux diet and gave the patient a handout.\par I am recommending Pepcid 40mg  before bedtime.\par \par

## 2023-01-04 NOTE — HISTORY OF PRESENT ILLNESS
[de-identified] : JONNA GOODE is a 82 year man with a history of cerumen impaction. he had COVID thanksgiving and has cough and PND. he takes Xarelto.

## 2023-01-11 ENCOUNTER — NON-APPOINTMENT (OUTPATIENT)
Age: 83
End: 2023-01-11

## 2023-01-11 ENCOUNTER — APPOINTMENT (OUTPATIENT)
Dept: OPHTHALMOLOGY | Facility: CLINIC | Age: 83
End: 2023-01-11
Payer: MEDICARE

## 2023-01-11 PROCEDURE — 92012 INTRM OPH EXAM EST PATIENT: CPT

## 2023-01-19 ENCOUNTER — APPOINTMENT (OUTPATIENT)
Dept: HEART AND VASCULAR | Facility: CLINIC | Age: 83
End: 2023-01-19
Payer: MEDICARE

## 2023-01-19 VITALS
WEIGHT: 155 LBS | SYSTOLIC BLOOD PRESSURE: 134 MMHG | HEIGHT: 71 IN | DIASTOLIC BLOOD PRESSURE: 78 MMHG | HEART RATE: 57 BPM | TEMPERATURE: 98 F | BODY MASS INDEX: 21.7 KG/M2 | OXYGEN SATURATION: 95 %

## 2023-01-19 PROCEDURE — 99214 OFFICE O/P EST MOD 30 MIN: CPT | Mod: 25

## 2023-01-19 PROCEDURE — 93000 ELECTROCARDIOGRAM COMPLETE: CPT

## 2023-01-19 NOTE — PHYSICAL EXAM
[No Rub] : no rub [Soft] : abdomen soft [Non Tender] : non-tender [No Edema] : no edema [Normal] : moves all extremities, no focal deficits, normal speech [Alert and Oriented] : alert and oriented [de-identified] : soft systolic murmur

## 2023-02-01 ENCOUNTER — APPOINTMENT (OUTPATIENT)
Dept: OTOLARYNGOLOGY | Facility: CLINIC | Age: 83
End: 2023-02-01

## 2023-02-08 ENCOUNTER — RX RENEWAL (OUTPATIENT)
Age: 83
End: 2023-02-08

## 2023-02-08 ENCOUNTER — APPOINTMENT (OUTPATIENT)
Dept: INTERNAL MEDICINE | Facility: CLINIC | Age: 83
End: 2023-02-08
Payer: MEDICARE

## 2023-02-08 VITALS
HEIGHT: 71 IN | DIASTOLIC BLOOD PRESSURE: 82 MMHG | WEIGHT: 155 LBS | SYSTOLIC BLOOD PRESSURE: 155 MMHG | TEMPERATURE: 98.3 F | BODY MASS INDEX: 21.7 KG/M2

## 2023-02-08 VITALS — DIASTOLIC BLOOD PRESSURE: 80 MMHG | SYSTOLIC BLOOD PRESSURE: 130 MMHG

## 2023-02-08 DIAGNOSIS — Z86.16 PERSONAL HISTORY OF COVID-19: ICD-10-CM

## 2023-02-08 DIAGNOSIS — E78.00 PURE HYPERCHOLESTEROLEMIA, UNSPECIFIED: ICD-10-CM

## 2023-02-08 PROCEDURE — 99213 OFFICE O/P EST LOW 20 MIN: CPT

## 2023-02-08 NOTE — HISTORY OF PRESENT ILLNESS
[FreeTextEntry1] : Interim reviewed;\par Cardiology follow up noted;\par Doing exercise \par \par Had Covid in November  Took PO antiviral \par Prolonged cough now resolved\par  [de-identified] : Walking\par Respiratory status is stable

## 2023-02-11 LAB
25(OH)D3 SERPL-MCNC: 49.1 NG/ML
ALBUMIN SERPL ELPH-MCNC: 4.7 G/DL
ALP BLD-CCNC: 85 U/L
ALT SERPL-CCNC: 22 U/L
ANION GAP SERPL CALC-SCNC: 11 MMOL/L
APPEARANCE: CLEAR
AST SERPL-CCNC: 32 U/L
BACTERIA: NEGATIVE
BASOPHILS # BLD AUTO: 0.05 K/UL
BASOPHILS NFR BLD AUTO: 0.8 %
BILIRUB SERPL-MCNC: 1.4 MG/DL
BILIRUBIN URINE: NEGATIVE
BLOOD URINE: NEGATIVE
BUN SERPL-MCNC: 16 MG/DL
CALCIUM SERPL-MCNC: 10.1 MG/DL
CHLORIDE SERPL-SCNC: 105 MMOL/L
CHOLEST SERPL-MCNC: 159 MG/DL
CO2 SERPL-SCNC: 28 MMOL/L
COLOR: NORMAL
CREAT SERPL-MCNC: 0.93 MG/DL
EGFR: 83 ML/MIN/1.73M2
EOSINOPHIL # BLD AUTO: 0.1 K/UL
EOSINOPHIL NFR BLD AUTO: 1.7 %
ESTIMATED AVERAGE GLUCOSE: 126 MG/DL
GLUCOSE QUALITATIVE U: NEGATIVE
GLUCOSE SERPL-MCNC: 84 MG/DL
HBA1C MFR BLD HPLC: 6 %
HCT VFR BLD CALC: 40.2 %
HDLC SERPL-MCNC: 93 MG/DL
HGB BLD-MCNC: 12.5 G/DL
HYALINE CASTS: 0 /LPF
IMM GRANULOCYTES NFR BLD AUTO: 0.3 %
KETONES URINE: NEGATIVE
LDLC SERPL CALC-MCNC: 57 MG/DL
LEUKOCYTE ESTERASE URINE: NEGATIVE
LYMPHOCYTES # BLD AUTO: 1.36 K/UL
LYMPHOCYTES NFR BLD AUTO: 22.8 %
MAN DIFF?: NORMAL
MCHC RBC-ENTMCNC: 30.7 PG
MCHC RBC-ENTMCNC: 31.1 GM/DL
MCV RBC AUTO: 98.8 FL
MICROSCOPIC-UA: NORMAL
MONOCYTES # BLD AUTO: 0.65 K/UL
MONOCYTES NFR BLD AUTO: 10.9 %
NEUTROPHILS # BLD AUTO: 3.78 K/UL
NEUTROPHILS NFR BLD AUTO: 63.5 %
NITRITE URINE: NEGATIVE
NONHDLC SERPL-MCNC: 67 MG/DL
PH URINE: 6.5
PLATELET # BLD AUTO: 213 K/UL
POTASSIUM SERPL-SCNC: 5 MMOL/L
PROT SERPL-MCNC: 7.2 G/DL
PROTEIN URINE: NEGATIVE
PSA SERPL-MCNC: 0.46 NG/ML
RBC # BLD: 4.07 M/UL
RBC # FLD: 13.4 %
RED BLOOD CELLS URINE: 2 /HPF
SODIUM SERPL-SCNC: 144 MMOL/L
SPECIFIC GRAVITY URINE: 1.01
SQUAMOUS EPITHELIAL CELLS: 0 /HPF
T4 FREE SERPL-MCNC: 1.5 NG/DL
TRIGL SERPL-MCNC: 49 MG/DL
TSH SERPL-ACNC: 2.59 UIU/ML
UROBILINOGEN URINE: NORMAL
WBC # FLD AUTO: 5.96 K/UL
WHITE BLOOD CELLS URINE: 1 /HPF

## 2023-03-22 ENCOUNTER — RX RENEWAL (OUTPATIENT)
Age: 83
End: 2023-03-22

## 2023-05-03 ENCOUNTER — NON-APPOINTMENT (OUTPATIENT)
Age: 83
End: 2023-05-03

## 2023-05-03 ENCOUNTER — APPOINTMENT (OUTPATIENT)
Dept: OPHTHALMOLOGY | Facility: CLINIC | Age: 83
End: 2023-05-03
Payer: MEDICARE

## 2023-05-03 PROCEDURE — 92014 COMPRE OPH EXAM EST PT 1/>: CPT

## 2023-05-30 ENCOUNTER — APPOINTMENT (OUTPATIENT)
Dept: HEART AND VASCULAR | Facility: CLINIC | Age: 83
End: 2023-05-30
Payer: MEDICARE

## 2023-05-30 ENCOUNTER — NON-APPOINTMENT (OUTPATIENT)
Age: 83
End: 2023-05-30

## 2023-05-30 VITALS — DIASTOLIC BLOOD PRESSURE: 76 MMHG | SYSTOLIC BLOOD PRESSURE: 150 MMHG

## 2023-05-30 VITALS
HEIGHT: 71 IN | DIASTOLIC BLOOD PRESSURE: 69 MMHG | SYSTOLIC BLOOD PRESSURE: 150 MMHG | HEART RATE: 49 BPM | BODY MASS INDEX: 21.7 KG/M2 | WEIGHT: 155 LBS

## 2023-05-30 PROCEDURE — 93000 ELECTROCARDIOGRAM COMPLETE: CPT

## 2023-05-30 PROCEDURE — 93306 TTE W/DOPPLER COMPLETE: CPT

## 2023-05-30 PROCEDURE — 99214 OFFICE O/P EST MOD 30 MIN: CPT | Mod: 25

## 2023-05-30 RX ORDER — FAMOTIDINE 40 MG/1
40 TABLET, FILM COATED ORAL
Qty: 30 | Refills: 2 | Status: DISCONTINUED | COMMUNITY
Start: 2023-01-04 | End: 2023-05-30

## 2023-05-30 NOTE — DISCUSSION/SUMMARY
[FreeTextEntry1] : EKG:NSR,LBBB(no change)\par TTE:mild  AS, normal LVEF\par do not feel AS is causing his lightheadedness/? bradycardia- will attach monitor and advised to f/u with Dr Goode- his BP is elevated- he's been having more sea salt- advised to reduce and recheck BP\par his bradycardia may be related to Dorzolamide\par continue Xarelto for PAF;lipitor for CAD/HLD

## 2023-05-30 NOTE — PHYSICAL EXAM
[Normal S1, S2] : normal S1, S2 [No Rub] : no rub [Soft] : abdomen soft [Non Tender] : non-tender [No Edema] : no edema [Normal] : moves all extremities, no focal deficits, normal speech [Alert and Oriented] : alert and oriented [de-identified] : soft systolic murmur

## 2023-05-30 NOTE — PHYSICAL EXAM
[Normal S1, S2] : normal S1, S2 [No Rub] : no rub [Soft] : abdomen soft [Non Tender] : non-tender [No Edema] : no edema [Normal] : moves all extremities, no focal deficits, normal speech [Alert and Oriented] : alert and oriented [de-identified] : soft systolic murmur

## 2023-06-20 ENCOUNTER — APPOINTMENT (OUTPATIENT)
Dept: INTERNAL MEDICINE | Facility: CLINIC | Age: 83
End: 2023-06-20
Payer: MEDICARE

## 2023-06-20 VITALS
SYSTOLIC BLOOD PRESSURE: 137 MMHG | WEIGHT: 155 LBS | DIASTOLIC BLOOD PRESSURE: 72 MMHG | TEMPERATURE: 98.1 F | RESPIRATION RATE: 15 BRPM | HEART RATE: 94 BPM | HEIGHT: 71 IN | BODY MASS INDEX: 21.7 KG/M2 | OXYGEN SATURATION: 99 %

## 2023-06-20 DIAGNOSIS — R42 DIZZINESS AND GIDDINESS: ICD-10-CM

## 2023-06-20 PROCEDURE — 99213 OFFICE O/P EST LOW 20 MIN: CPT

## 2023-06-20 NOTE — ASSESSMENT
[FreeTextEntry1] : Above symptoms not clear etiology;\par Will obtain Duplex of Carotids;\par Likely related to eyes;\par WIll see again in 4 months

## 2023-06-20 NOTE — HISTORY OF PRESENT ILLNESS
[FreeTextEntry1] : Interim reviewed;'\par Lightheadedness ; Event monitor noted\par Still persists today;\par Has had near syncope  [de-identified] : Medication with change \par Will get back to eye MD ;\par Has had some visual complaints

## 2023-06-21 ENCOUNTER — APPOINTMENT (OUTPATIENT)
Dept: ULTRASOUND IMAGING | Facility: HOSPITAL | Age: 83
End: 2023-06-21
Payer: MEDICARE

## 2023-06-21 ENCOUNTER — OUTPATIENT (OUTPATIENT)
Dept: OUTPATIENT SERVICES | Facility: HOSPITAL | Age: 83
LOS: 1 days | End: 2023-06-21
Payer: MEDICARE

## 2023-06-21 DIAGNOSIS — Z86.69 PERSONAL HISTORY OF OTHER DISEASES OF THE NERVOUS SYSTEM AND SENSE ORGANS: Chronic | ICD-10-CM

## 2023-06-21 DIAGNOSIS — Z98.890 OTHER SPECIFIED POSTPROCEDURAL STATES: Chronic | ICD-10-CM

## 2023-06-21 DIAGNOSIS — Z95.1 PRESENCE OF AORTOCORONARY BYPASS GRAFT: Chronic | ICD-10-CM

## 2023-06-21 PROCEDURE — 93880 EXTRACRANIAL BILAT STUDY: CPT | Mod: 26

## 2023-06-21 PROCEDURE — 93880 EXTRACRANIAL BILAT STUDY: CPT

## 2023-07-18 ENCOUNTER — APPOINTMENT (OUTPATIENT)
Dept: UROLOGY | Facility: CLINIC | Age: 83
End: 2023-07-18
Payer: MEDICARE

## 2023-07-18 VITALS
SYSTOLIC BLOOD PRESSURE: 152 MMHG | DIASTOLIC BLOOD PRESSURE: 77 MMHG | HEART RATE: 52 BPM | TEMPERATURE: 97.3 F | OXYGEN SATURATION: 100 %

## 2023-07-18 PROCEDURE — 51798 US URINE CAPACITY MEASURE: CPT

## 2023-07-18 PROCEDURE — 99214 OFFICE O/P EST MOD 30 MIN: CPT | Mod: 25

## 2023-07-18 RX ORDER — FINASTERIDE 5 MG/1
5 TABLET, FILM COATED ORAL
Qty: 90 | Refills: 3 | Status: ACTIVE | COMMUNITY
Start: 2021-07-20 | End: 1900-01-01

## 2023-07-18 NOTE — HISTORY OF PRESENT ILLNESS
[FreeTextEntry1] : BPH\par on finasteride alone\par hesitant to consider other medications\par noinffections\par no hematuria\par "I am managing"

## 2023-07-18 NOTE — ASSESSMENT
[FreeTextEntry1] : BPH\par UA UCX\par refill finsateride\par PVR r/o retention: 235cc\par f/u 1 year

## 2023-09-06 ENCOUNTER — APPOINTMENT (OUTPATIENT)
Dept: OPHTHALMOLOGY | Facility: CLINIC | Age: 83
End: 2023-09-06
Payer: MEDICARE

## 2023-09-06 ENCOUNTER — NON-APPOINTMENT (OUTPATIENT)
Age: 83
End: 2023-09-06

## 2023-09-06 PROCEDURE — 92012 INTRM OPH EXAM EST PATIENT: CPT

## 2023-09-06 PROCEDURE — 92083 EXTENDED VISUAL FIELD XM: CPT

## 2023-09-06 PROCEDURE — 92133 CPTRZD OPH DX IMG PST SGM ON: CPT

## 2023-09-22 ENCOUNTER — RX RENEWAL (OUTPATIENT)
Age: 83
End: 2023-09-22

## 2023-10-03 ENCOUNTER — APPOINTMENT (OUTPATIENT)
Dept: HEART AND VASCULAR | Facility: CLINIC | Age: 83
End: 2023-10-03
Payer: MEDICARE

## 2023-10-03 VITALS
BODY MASS INDEX: 21.42 KG/M2 | DIASTOLIC BLOOD PRESSURE: 64 MMHG | WEIGHT: 153 LBS | HEART RATE: 53 BPM | TEMPERATURE: 97 F | HEIGHT: 71 IN | OXYGEN SATURATION: 98 % | SYSTOLIC BLOOD PRESSURE: 124 MMHG

## 2023-10-03 DIAGNOSIS — R94.31 ABNORMAL ELECTROCARDIOGRAM [ECG] [EKG]: ICD-10-CM

## 2023-10-03 DIAGNOSIS — Z95.1 PRESENCE OF AORTOCORONARY BYPASS GRAFT: ICD-10-CM

## 2023-10-03 PROCEDURE — 99214 OFFICE O/P EST MOD 30 MIN: CPT | Mod: 25

## 2023-10-03 PROCEDURE — 93000 ELECTROCARDIOGRAM COMPLETE: CPT

## 2023-10-24 ENCOUNTER — APPOINTMENT (OUTPATIENT)
Dept: INTERNAL MEDICINE | Facility: CLINIC | Age: 83
End: 2023-10-24
Payer: MEDICARE

## 2023-10-24 DIAGNOSIS — N40.1 BENIGN PROSTATIC HYPERPLASIA WITH LOWER URINARY TRACT SYMPMS: ICD-10-CM

## 2023-10-24 DIAGNOSIS — D64.9 ANEMIA, UNSPECIFIED: ICD-10-CM

## 2023-10-24 DIAGNOSIS — Z23 ENCOUNTER FOR IMMUNIZATION: ICD-10-CM

## 2023-10-24 DIAGNOSIS — I50.30 UNSPECIFIED DIASTOLIC (CONGESTIVE) HEART FAILURE: ICD-10-CM

## 2023-10-24 DIAGNOSIS — R35.1 BENIGN PROSTATIC HYPERPLASIA WITH LOWER URINARY TRACT SYMPMS: ICD-10-CM

## 2023-10-24 DIAGNOSIS — I25.810 ATHEROSCLEROSIS OF CORONARY ARTERY BYPASS GRAFT(S) W/OUT ANGINA PECTORIS: ICD-10-CM

## 2023-10-24 PROCEDURE — G0008: CPT

## 2023-10-24 PROCEDURE — 99213 OFFICE O/P EST LOW 20 MIN: CPT | Mod: 25

## 2023-10-24 PROCEDURE — 90662 IIV NO PRSV INCREASED AG IM: CPT

## 2023-10-24 PROCEDURE — 36415 COLL VENOUS BLD VENIPUNCTURE: CPT

## 2023-10-26 PROBLEM — D64.9 ANEMIA: Status: ACTIVE | Noted: 2018-05-29

## 2023-10-27 LAB
ALBUMIN SERPL ELPH-MCNC: 4.5 G/DL
ALP BLD-CCNC: 87 U/L
ALT SERPL-CCNC: 22 U/L
ANION GAP SERPL CALC-SCNC: 8 MMOL/L
APPEARANCE: CLEAR
AST SERPL-CCNC: 33 U/L
BACTERIA: NEGATIVE /HPF
BASOPHILS # BLD AUTO: 0.04 K/UL
BASOPHILS NFR BLD AUTO: 0.7 %
BILIRUB SERPL-MCNC: 1.1 MG/DL
BILIRUBIN URINE: NEGATIVE
BLOOD URINE: NEGATIVE
BUN SERPL-MCNC: 16 MG/DL
CALCIUM SERPL-MCNC: 9.8 MG/DL
CAST: 0 /LPF
CHLORIDE SERPL-SCNC: 105 MMOL/L
CHOLEST SERPL-MCNC: 147 MG/DL
CO2 SERPL-SCNC: 29 MMOL/L
COLOR: YELLOW
CREAT SERPL-MCNC: 0.9 MG/DL
EGFR: 86 ML/MIN/1.73M2
EOSINOPHIL # BLD AUTO: 0.11 K/UL
EOSINOPHIL NFR BLD AUTO: 2 %
EPITHELIAL CELLS: 0 /HPF
ESTIMATED AVERAGE GLUCOSE: 126 MG/DL
GLUCOSE QUALITATIVE U: NEGATIVE MG/DL
GLUCOSE SERPL-MCNC: 75 MG/DL
HBA1C MFR BLD HPLC: 6 %
HCT VFR BLD CALC: 39.3 %
HDLC SERPL-MCNC: 81 MG/DL
HGB BLD-MCNC: 12.5 G/DL
IMM GRANULOCYTES NFR BLD AUTO: 0.2 %
KETONES URINE: NEGATIVE MG/DL
LDLC SERPL CALC-MCNC: 56 MG/DL
LEUKOCYTE ESTERASE URINE: NEGATIVE
LYMPHOCYTES # BLD AUTO: 1.18 K/UL
LYMPHOCYTES NFR BLD AUTO: 22 %
MAN DIFF?: NORMAL
MCHC RBC-ENTMCNC: 31.2 PG
MCHC RBC-ENTMCNC: 31.8 GM/DL
MCV RBC AUTO: 98 FL
MICROSCOPIC-UA: NORMAL
MONOCYTES # BLD AUTO: 0.68 K/UL
MONOCYTES NFR BLD AUTO: 12.7 %
NEUTROPHILS # BLD AUTO: 3.35 K/UL
NEUTROPHILS NFR BLD AUTO: 62.4 %
NITRITE URINE: NEGATIVE
NONHDLC SERPL-MCNC: 65 MG/DL
PH URINE: 7
PLATELET # BLD AUTO: 199 K/UL
POTASSIUM SERPL-SCNC: 5.3 MMOL/L
PROT SERPL-MCNC: 6.8 G/DL
PROTEIN URINE: NEGATIVE MG/DL
PSA SERPL-MCNC: 0.43 NG/ML
RBC # BLD: 4.01 M/UL
RBC # FLD: 13.2 %
RED BLOOD CELLS URINE: 1 /HPF
SODIUM SERPL-SCNC: 142 MMOL/L
SPECIFIC GRAVITY URINE: 1.01
T4 FREE SERPL-MCNC: 1.3 NG/DL
TRIGL SERPL-MCNC: 37 MG/DL
TSH SERPL-ACNC: 2.87 UIU/ML
UROBILINOGEN URINE: 1 MG/DL
WBC # FLD AUTO: 5.37 K/UL
WHITE BLOOD CELLS URINE: 0 /HPF

## 2024-01-10 ENCOUNTER — APPOINTMENT (OUTPATIENT)
Dept: OTOLARYNGOLOGY | Facility: CLINIC | Age: 84
End: 2024-01-10
Payer: MEDICARE

## 2024-01-10 DIAGNOSIS — H91.10 PRESBYCUSIS, UNSPECIFIED EAR: ICD-10-CM

## 2024-01-10 DIAGNOSIS — H61.23 IMPACTED CERUMEN, BILATERAL: ICD-10-CM

## 2024-01-10 PROCEDURE — 99212 OFFICE O/P EST SF 10 MIN: CPT | Mod: 25

## 2024-01-10 PROCEDURE — 69210 REMOVE IMPACTED EAR WAX UNI: CPT

## 2024-01-10 NOTE — PHYSICAL EXAM
[TextEntry] : PHYSICAL EXAM  General: The patient was alert and oriented and in no distress. Voice was normal.  Neurologic: Cranial Nerves II-XII intact PERRLA There was no significant nystagmus or disconjugate gaze noted.  EOM's: Normal  Face: The patient had no facial asymmetry or mass. The skin was unremarkable.  Ears: External ears were normal without deformity. Ear canals were normal. Tympanic membranes were intact and normal. No perforation or effusion I removed impacted cerumen from both ears under the microscope with curet, forceps   Nose:  The external nose had no significant deformity.  There was no facial tenderness.  On anterior rhinoscopy, the nasal mucosa was normal.  The anterior septum was normal. There were no visualized polyps purulence  or masses.  Oral cavity: The oral mucosa was normal. The oral and base of tongue were clear and without mass. The gingival and buccal mucosa were moist and without lesions. The palate moved well. There was no cleft palate. There appeared to be good salivary flow.   There was no pus, erythema or mass in the oral cavity/oropharynx.  Neck:  The neck was symmetrical. The parotid and submandibular glands were normal without masses. The trachea was midline and there was no unusual crepitus. Thyroid was smooth and nontender and no masses were palpated. Cervical adenopathy- none.

## 2024-01-10 NOTE — HISTORY OF PRESENT ILLNESS
[de-identified] : JONNA GOODE  is a 83 year man with a history of cerumen impaction. he has mild bryon loss.

## 2024-01-29 ENCOUNTER — RX RENEWAL (OUTPATIENT)
Age: 84
End: 2024-01-29

## 2024-01-29 RX ORDER — RIVAROXABAN 20 MG/1
20 TABLET, FILM COATED ORAL
Qty: 90 | Refills: 1 | Status: ACTIVE | COMMUNITY
Start: 2018-06-08 | End: 1900-01-01

## 2024-02-14 ENCOUNTER — APPOINTMENT (OUTPATIENT)
Dept: OPHTHALMOLOGY | Facility: CLINIC | Age: 84
End: 2024-02-14
Payer: MEDICARE

## 2024-02-14 ENCOUNTER — NON-APPOINTMENT (OUTPATIENT)
Age: 84
End: 2024-02-14

## 2024-02-14 PROCEDURE — 92014 COMPRE OPH EXAM EST PT 1/>: CPT

## 2024-03-05 ENCOUNTER — APPOINTMENT (OUTPATIENT)
Dept: INTERNAL MEDICINE | Facility: CLINIC | Age: 84
End: 2024-03-05
Payer: MEDICARE

## 2024-03-05 VITALS
HEIGHT: 71 IN | WEIGHT: 156 LBS | SYSTOLIC BLOOD PRESSURE: 156 MMHG | TEMPERATURE: 97.5 F | OXYGEN SATURATION: 100 % | HEART RATE: 61 BPM | BODY MASS INDEX: 21.84 KG/M2 | DIASTOLIC BLOOD PRESSURE: 81 MMHG

## 2024-03-05 DIAGNOSIS — I35.0 NONRHEUMATIC AORTIC (VALVE) STENOSIS: ICD-10-CM

## 2024-03-05 DIAGNOSIS — R05.9 COUGH, UNSPECIFIED: ICD-10-CM

## 2024-03-05 DIAGNOSIS — I48.91 UNSPECIFIED ATRIAL FIBRILLATION: ICD-10-CM

## 2024-03-05 PROCEDURE — 36415 COLL VENOUS BLD VENIPUNCTURE: CPT

## 2024-03-05 PROCEDURE — 99213 OFFICE O/P EST LOW 20 MIN: CPT | Mod: 25

## 2024-03-05 NOTE — HISTORY OF PRESENT ILLNESS
[FreeTextEntry1] : Chief complaint chronic cough Had MRI of Chest which was negative.; Gets yearly MRI scans  [de-identified] : Will follow up with Dr Banuelos Medication without change Still doing exercise on line

## 2024-03-05 NOTE — HEALTH RISK ASSESSMENT
[No] : No [No falls in past year] : Patient reported no falls in the past year [0] : 2) Feeling down, depressed, or hopeless: Not at all (0) [[id number]] : 0

## 2024-03-05 NOTE — PHYSICAL EXAM
[Well Nourished] : well nourished [No Acute Distress] : no acute distress [Well Developed] : well developed [Well-Appearing] : well-appearing [Normal Sclera/Conjunctiva] : normal sclera/conjunctiva [PERRL] : pupils equal round and reactive to light [EOMI] : extraocular movements intact [Normal Outer Ear/Nose] : the outer ears and nose were normal in appearance [Normal Oropharynx] : the oropharynx was normal [No JVD] : no jugular venous distention [Supple] : supple [No Lymphadenopathy] : no lymphadenopathy [No Respiratory Distress] : no respiratory distress  [Thyroid Normal, No Nodules] : the thyroid was normal and there were no nodules present [No Accessory Muscle Use] : no accessory muscle use [Normal Rate] : normal rate  [Clear to Auscultation] : lungs were clear to auscultation bilaterally [Normal S1, S2] : normal S1 and S2 [Regular Rhythm] : with a regular rhythm [No Carotid Bruits] : no carotid bruits [No Murmur] : no murmur heard [No Abdominal Bruit] : a ~M bruit was not heard ~T in the abdomen [No Varicosities] : no varicosities [Pedal Pulses Present] : the pedal pulses are present [No Edema] : there was no peripheral edema [No Palpable Aorta] : no palpable aorta [No Extremity Clubbing/Cyanosis] : no extremity clubbing/cyanosis [Soft] : abdomen soft [Non Tender] : non-tender [Non-distended] : non-distended [No Masses] : no abdominal mass palpated [No HSM] : no HSM [Normal Bowel Sounds] : normal bowel sounds [Normal Posterior Cervical Nodes] : no posterior cervical lymphadenopathy [Normal Anterior Cervical Nodes] : no anterior cervical lymphadenopathy [No CVA Tenderness] : no CVA  tenderness [No Spinal Tenderness] : no spinal tenderness [Grossly Normal Strength/Tone] : grossly normal strength/tone [No Joint Swelling] : no joint swelling [No Rash] : no rash [Coordination Grossly Intact] : coordination grossly intact [No Focal Deficits] : no focal deficits [Deep Tendon Reflexes (DTR)] : deep tendon reflexes were 2+ and symmetric [Normal Gait] : normal gait [Normal Insight/Judgement] : insight and judgment were intact [Normal Affect] : the affect was normal

## 2024-03-07 LAB
25(OH)D3 SERPL-MCNC: 41.4 NG/ML
ALBUMIN SERPL ELPH-MCNC: 4.4 G/DL
ALP BLD-CCNC: 89 U/L
ALT SERPL-CCNC: 20 U/L
ANION GAP SERPL CALC-SCNC: 8 MMOL/L
APPEARANCE: CLEAR
AST SERPL-CCNC: 29 U/L
BACTERIA: NEGATIVE /HPF
BILIRUB SERPL-MCNC: 1.1 MG/DL
BILIRUBIN URINE: NEGATIVE
BLOOD URINE: NEGATIVE
BUN SERPL-MCNC: 14 MG/DL
CALCIUM SERPL-MCNC: 10 MG/DL
CAST: 0 /LPF
CHLORIDE SERPL-SCNC: 104 MMOL/L
CHOLEST SERPL-MCNC: 149 MG/DL
CO2 SERPL-SCNC: 30 MMOL/L
COLOR: YELLOW
CREAT SERPL-MCNC: 0.92 MG/DL
EGFR: 84 ML/MIN/1.73M2
EPITHELIAL CELLS: 0 /HPF
ESTIMATED AVERAGE GLUCOSE: 123 MG/DL
GLUCOSE QUALITATIVE U: NEGATIVE MG/DL
GLUCOSE SERPL-MCNC: 89 MG/DL
HBA1C MFR BLD HPLC: 5.9 %
HCT VFR BLD CALC: 39.6 %
HDLC SERPL-MCNC: 79 MG/DL
HGB BLD-MCNC: 12.5 G/DL
KETONES URINE: NEGATIVE MG/DL
LDLC SERPL CALC-MCNC: 60 MG/DL
LEUKOCYTE ESTERASE URINE: NEGATIVE
MCHC RBC-ENTMCNC: 31.3 PG
MCHC RBC-ENTMCNC: 31.6 GM/DL
MCV RBC AUTO: 99 FL
MICROSCOPIC-UA: NORMAL
NITRITE URINE: NEGATIVE
NONHDLC SERPL-MCNC: 70 MG/DL
NT-PROBNP SERPL-MCNC: 457 PG/ML
PH URINE: 7.5
PLATELET # BLD AUTO: 220 K/UL
POTASSIUM SERPL-SCNC: 5.1 MMOL/L
PROT SERPL-MCNC: 6.8 G/DL
PROTEIN URINE: NEGATIVE MG/DL
RBC # BLD: 4 M/UL
RBC # FLD: 13.1 %
RED BLOOD CELLS URINE: 1 /HPF
SODIUM SERPL-SCNC: 142 MMOL/L
SPECIFIC GRAVITY URINE: 1.01
T4 FREE SERPL-MCNC: 1.4 NG/DL
TRIGL SERPL-MCNC: 44 MG/DL
TSH SERPL-ACNC: 2.84 UIU/ML
UROBILINOGEN URINE: 1 MG/DL
WBC # FLD AUTO: 5.26 K/UL
WHITE BLOOD CELLS URINE: 0 /HPF

## 2024-03-12 ENCOUNTER — NON-APPOINTMENT (OUTPATIENT)
Age: 84
End: 2024-03-12

## 2024-03-12 ENCOUNTER — APPOINTMENT (OUTPATIENT)
Dept: HEART AND VASCULAR | Facility: CLINIC | Age: 84
End: 2024-03-12
Payer: MEDICARE

## 2024-03-12 VITALS
TEMPERATURE: 97.8 F | DIASTOLIC BLOOD PRESSURE: 74 MMHG | SYSTOLIC BLOOD PRESSURE: 136 MMHG | OXYGEN SATURATION: 98 % | BODY MASS INDEX: 21.84 KG/M2 | HEIGHT: 71 IN | WEIGHT: 156 LBS | HEART RATE: 59 BPM

## 2024-03-12 DIAGNOSIS — I25.10 ATHEROSCLEROTIC HEART DISEASE OF NATIVE CORONARY ARTERY W/OUT ANGINA PECTORIS: ICD-10-CM

## 2024-03-12 DIAGNOSIS — I48.0 PAROXYSMAL ATRIAL FIBRILLATION: ICD-10-CM

## 2024-03-12 DIAGNOSIS — E78.5 HYPERLIPIDEMIA, UNSPECIFIED: ICD-10-CM

## 2024-03-12 DIAGNOSIS — I45.4 NONSPECIFIC INTRAVENTRICULAR BLOCK: ICD-10-CM

## 2024-03-12 PROCEDURE — 93000 ELECTROCARDIOGRAM COMPLETE: CPT

## 2024-03-12 PROCEDURE — G2211 COMPLEX E/M VISIT ADD ON: CPT

## 2024-03-12 PROCEDURE — 99214 OFFICE O/P EST MOD 30 MIN: CPT

## 2024-03-12 NOTE — PHYSICAL EXAM
[Normal S1, S2] : normal S1, S2 [No Rub] : no rub [Soft] : abdomen soft [Non Tender] : non-tender [No Edema] : no edema [Normal] : moves all extremities, no focal deficits, normal speech [Alert and Oriented] : alert and oriented [de-identified] : soft systolic murmur

## 2024-03-12 NOTE — DISCUSSION/SUMMARY
[FreeTextEntry1] : EKG:NSR,IVCD reviewed labs LDL at goal continue lipitor for HLD/CAD;xarelto for paf

## 2024-06-17 RX ORDER — ATORVASTATIN CALCIUM 80 MG/1
80 TABLET, FILM COATED ORAL
Qty: 90 | Refills: 1 | Status: ACTIVE | COMMUNITY
Start: 2019-07-23 | End: 1900-01-01

## 2024-07-17 ENCOUNTER — APPOINTMENT (OUTPATIENT)
Dept: UROLOGY | Facility: CLINIC | Age: 84
End: 2024-07-17
Payer: MEDICARE

## 2024-07-17 VITALS
HEIGHT: 71 IN | SYSTOLIC BLOOD PRESSURE: 143 MMHG | HEART RATE: 54 BPM | BODY MASS INDEX: 21.84 KG/M2 | TEMPERATURE: 98.7 F | WEIGHT: 156 LBS | DIASTOLIC BLOOD PRESSURE: 74 MMHG

## 2024-07-17 DIAGNOSIS — N40.1 BENIGN PROSTATIC HYPERPLASIA WITH LOWER URINARY TRACT SYMPMS: ICD-10-CM

## 2024-07-17 DIAGNOSIS — R35.1 BENIGN PROSTATIC HYPERPLASIA WITH LOWER URINARY TRACT SYMPMS: ICD-10-CM

## 2024-07-17 PROCEDURE — 99214 OFFICE O/P EST MOD 30 MIN: CPT

## 2024-07-24 ENCOUNTER — APPOINTMENT (OUTPATIENT)
Dept: OPHTHALMOLOGY | Facility: CLINIC | Age: 84
End: 2024-07-24

## 2024-07-30 ENCOUNTER — APPOINTMENT (OUTPATIENT)
Dept: INTERNAL MEDICINE | Facility: CLINIC | Age: 84
End: 2024-07-30
Payer: MEDICARE

## 2024-07-30 VITALS
TEMPERATURE: 98 F | HEART RATE: 64 BPM | HEIGHT: 71 IN | DIASTOLIC BLOOD PRESSURE: 75 MMHG | WEIGHT: 153 LBS | SYSTOLIC BLOOD PRESSURE: 137 MMHG | OXYGEN SATURATION: 96 % | BODY MASS INDEX: 21.42 KG/M2

## 2024-07-30 DIAGNOSIS — R42 DIZZINESS AND GIDDINESS: ICD-10-CM

## 2024-07-30 DIAGNOSIS — I25.810 ATHEROSCLEROSIS OF CORONARY ARTERY BYPASS GRAFT(S) W/OUT ANGINA PECTORIS: ICD-10-CM

## 2024-07-30 DIAGNOSIS — I48.91 UNSPECIFIED ATRIAL FIBRILLATION: ICD-10-CM

## 2024-07-30 DIAGNOSIS — R35.1 BENIGN PROSTATIC HYPERPLASIA WITH LOWER URINARY TRACT SYMPMS: ICD-10-CM

## 2024-07-30 DIAGNOSIS — N40.1 BENIGN PROSTATIC HYPERPLASIA WITH LOWER URINARY TRACT SYMPMS: ICD-10-CM

## 2024-07-30 PROCEDURE — 99213 OFFICE O/P EST LOW 20 MIN: CPT

## 2024-07-30 NOTE — ASSESSMENT
[FreeTextEntry1] : No change in current medication. Message sent to Dr Banuelos about another ECHO re Aortic Stenosis

## 2024-07-30 NOTE — PHYSICAL EXAM
[No Acute Distress] : no acute distress [Well Nourished] : well nourished [Well Developed] : well developed [Well-Appearing] : well-appearing [Normal Sclera/Conjunctiva] : normal sclera/conjunctiva [PERRL] : pupils equal round and reactive to light [EOMI] : extraocular movements intact [Normal Outer Ear/Nose] : the outer ears and nose were normal in appearance [Normal Oropharynx] : the oropharynx was normal [No JVD] : no jugular venous distention [No Lymphadenopathy] : no lymphadenopathy [Supple] : supple [Thyroid Normal, No Nodules] : the thyroid was normal and there were no nodules present [No Respiratory Distress] : no respiratory distress  [No Accessory Muscle Use] : no accessory muscle use [Clear to Auscultation] : lungs were clear to auscultation bilaterally [Regular Rhythm] : with a regular rhythm [Normal Rate] : normal rate  [No Murmur] : no murmur heard [Normal S1, S2] : normal S1 and S2 [No Carotid Bruits] : no carotid bruits [No Abdominal Bruit] : a ~M bruit was not heard ~T in the abdomen [No Varicosities] : no varicosities [Pedal Pulses Present] : the pedal pulses are present [No Edema] : there was no peripheral edema [No Palpable Aorta] : no palpable aorta [No Extremity Clubbing/Cyanosis] : no extremity clubbing/cyanosis [Soft] : abdomen soft [Non Tender] : non-tender [Non-distended] : non-distended [No Masses] : no abdominal mass palpated [No HSM] : no HSM [Normal Bowel Sounds] : normal bowel sounds [Normal Posterior Cervical Nodes] : no posterior cervical lymphadenopathy [Normal Anterior Cervical Nodes] : no anterior cervical lymphadenopathy [No CVA Tenderness] : no CVA  tenderness [No Spinal Tenderness] : no spinal tenderness [No Joint Swelling] : no joint swelling [Grossly Normal Strength/Tone] : grossly normal strength/tone [No Rash] : no rash [Coordination Grossly Intact] : coordination grossly intact [No Focal Deficits] : no focal deficits [Normal Gait] : normal gait [Deep Tendon Reflexes (DTR)] : deep tendon reflexes were 2+ and symmetric [Normal Affect] : the affect was normal [Normal Insight/Judgement] : insight and judgment were intact

## 2024-07-30 NOTE — HISTORY OF PRESENT ILLNESS
[FreeTextEntry1] : Chief complaint   lightheaded at times Feels spacey Has had eye issues [de-identified] : Otherwise, no change in medication Has been able to exercise

## 2024-07-30 NOTE — HEALTH RISK ASSESSMENT
[No] : No [No falls in past year] : Patient reported no falls in the past year [0] : 2) Feeling down, depressed, or hopeless: Not at all (0) [[id number]] : 0

## 2024-08-06 ENCOUNTER — APPOINTMENT (OUTPATIENT)
Dept: HEART AND VASCULAR | Facility: CLINIC | Age: 84
End: 2024-08-06

## 2024-08-06 PROBLEM — R00.1 BRADYCARDIA: Status: ACTIVE | Noted: 2024-08-06

## 2024-08-06 PROCEDURE — 99214 OFFICE O/P EST MOD 30 MIN: CPT | Mod: 25

## 2024-08-06 PROCEDURE — 93242 EXT ECG>48HR<7D RECORDING: CPT

## 2024-08-06 PROCEDURE — 93000 ELECTROCARDIOGRAM COMPLETE: CPT

## 2024-08-06 PROCEDURE — 93306 TTE W/DOPPLER COMPLETE: CPT

## 2024-08-06 NOTE — HISTORY OF PRESENT ILLNESS
[FreeTextEntry1] : reports episodes of lightheadedness, non exertional- he feels due to visual problems he has no cp/sob/palpitations

## 2024-08-06 NOTE — DISCUSSION/SUMMARY
[FreeTextEntry1] : EKG;atrial bradycardia doubt sx due t AS but will get TTE will get event recorder for bradycardia/lightheadedness to r/o sever bradycardia s cause of his sx continue xarelto for PAF;lipitor for HLD /CAD

## 2024-08-06 NOTE — PHYSICAL EXAM
[Normal S1, S2] : normal S1, S2 [No Rub] : no rub [Soft] : abdomen soft [Non Tender] : non-tender [No Edema] : no edema [Normal] : moves all extremities, no focal deficits, normal speech [Alert and Oriented] : alert and oriented [de-identified] : soft systolic murmur

## 2024-08-15 PROCEDURE — 93244 EXT ECG>48HR<7D REV&INTERPJ: CPT

## 2024-08-23 ENCOUNTER — APPOINTMENT (OUTPATIENT)
Dept: OPHTHALMOLOGY | Facility: CLINIC | Age: 84
End: 2024-08-23
Payer: MEDICARE

## 2024-08-23 ENCOUNTER — NON-APPOINTMENT (OUTPATIENT)
Age: 84
End: 2024-08-23

## 2024-08-23 PROCEDURE — 92012 INTRM OPH EXAM EST PATIENT: CPT

## 2024-09-16 ENCOUNTER — RX RENEWAL (OUTPATIENT)
Age: 84
End: 2024-09-16

## 2024-10-07 ENCOUNTER — NON-APPOINTMENT (OUTPATIENT)
Age: 84
End: 2024-10-07

## 2024-11-20 ENCOUNTER — MED ADMIN CHARGE (OUTPATIENT)
Age: 84
End: 2024-11-20

## 2024-11-20 ENCOUNTER — APPOINTMENT (OUTPATIENT)
Dept: INTERNAL MEDICINE | Facility: CLINIC | Age: 84
End: 2024-11-20
Payer: MEDICARE

## 2024-11-20 VITALS
HEART RATE: 56 BPM | HEIGHT: 71 IN | TEMPERATURE: 97.3 F | BODY MASS INDEX: 21.42 KG/M2 | SYSTOLIC BLOOD PRESSURE: 171 MMHG | WEIGHT: 153 LBS | DIASTOLIC BLOOD PRESSURE: 80 MMHG | OXYGEN SATURATION: 100 %

## 2024-11-20 DIAGNOSIS — I35.0 NONRHEUMATIC AORTIC (VALVE) STENOSIS: ICD-10-CM

## 2024-11-20 DIAGNOSIS — Z86.16 PERSONAL HISTORY OF COVID-19: ICD-10-CM

## 2024-11-20 DIAGNOSIS — I48.91 UNSPECIFIED ATRIAL FIBRILLATION: ICD-10-CM

## 2024-11-20 DIAGNOSIS — I50.30 UNSPECIFIED DIASTOLIC (CONGESTIVE) HEART FAILURE: ICD-10-CM

## 2024-11-20 DIAGNOSIS — H61.23 IMPACTED CERUMEN, BILATERAL: ICD-10-CM

## 2024-11-20 DIAGNOSIS — I25.10 ATHEROSCLEROTIC HEART DISEASE OF NATIVE CORONARY ARTERY W/OUT ANGINA PECTORIS: ICD-10-CM

## 2024-11-20 PROCEDURE — 36415 COLL VENOUS BLD VENIPUNCTURE: CPT

## 2024-11-20 PROCEDURE — G0008: CPT

## 2024-11-20 PROCEDURE — 90662 IIV NO PRSV INCREASED AG IM: CPT

## 2024-11-20 PROCEDURE — 99213 OFFICE O/P EST LOW 20 MIN: CPT | Mod: 25

## 2024-11-21 LAB
ALBUMIN SERPL ELPH-MCNC: 4.5 G/DL
ALP BLD-CCNC: 93 U/L
ALT SERPL-CCNC: 25 U/L
ANION GAP SERPL CALC-SCNC: 11 MMOL/L
APPEARANCE: CLEAR
AST SERPL-CCNC: 34 U/L
BASOPHILS # BLD AUTO: 0.04 K/UL
BASOPHILS NFR BLD AUTO: 0.6 %
BILIRUB SERPL-MCNC: 1.4 MG/DL
BILIRUBIN URINE: NEGATIVE
BLOOD URINE: NEGATIVE
BUN SERPL-MCNC: 15 MG/DL
CALCIUM SERPL-MCNC: 10.1 MG/DL
CHLORIDE SERPL-SCNC: 104 MMOL/L
CO2 SERPL-SCNC: 28 MMOL/L
COLOR: YELLOW
CREAT SERPL-MCNC: 0.85 MG/DL
EGFR: 87 ML/MIN/1.73M2
EOSINOPHIL # BLD AUTO: 0.12 K/UL
EOSINOPHIL NFR BLD AUTO: 1.8 %
FOLATE SERPL-MCNC: 13.9 NG/ML
GLUCOSE QUALITATIVE U: NEGATIVE MG/DL
GLUCOSE SERPL-MCNC: 82 MG/DL
HCT VFR BLD CALC: 39.3 %
HGB BLD-MCNC: 12.6 G/DL
IMM GRANULOCYTES NFR BLD AUTO: 0.3 %
KETONES URINE: NEGATIVE MG/DL
LEUKOCYTE ESTERASE URINE: NEGATIVE
LYMPHOCYTES # BLD AUTO: 1.49 K/UL
LYMPHOCYTES NFR BLD AUTO: 22.9 %
MAN DIFF?: NORMAL
MCHC RBC-ENTMCNC: 32 PG
MCHC RBC-ENTMCNC: 32.1 G/DL
MCV RBC AUTO: 99.7 FL
MONOCYTES # BLD AUTO: 0.88 K/UL
MONOCYTES NFR BLD AUTO: 13.5 %
NEUTROPHILS # BLD AUTO: 3.96 K/UL
NEUTROPHILS NFR BLD AUTO: 60.9 %
NITRITE URINE: NEGATIVE
PH URINE: 7
PLATELET # BLD AUTO: 221 K/UL
POTASSIUM SERPL-SCNC: 4.7 MMOL/L
PROT SERPL-MCNC: 7.1 G/DL
PROTEIN URINE: NEGATIVE MG/DL
RBC # BLD: 3.94 M/UL
RBC # FLD: 13.5 %
SODIUM SERPL-SCNC: 144 MMOL/L
SPECIFIC GRAVITY URINE: 1.01
T4 FREE SERPL-MCNC: 1.3 NG/DL
TSH SERPL-ACNC: 3.27 UIU/ML
UROBILINOGEN URINE: 0.2 MG/DL
VIT B12 SERPL-MCNC: 560 PG/ML
WBC # FLD AUTO: 6.51 K/UL

## 2024-11-22 LAB
ESTIMATED AVERAGE GLUCOSE: 123 MG/DL
HBA1C MFR BLD HPLC: 5.9 %

## 2024-12-09 ENCOUNTER — RX RENEWAL (OUTPATIENT)
Age: 84
End: 2024-12-09

## 2024-12-11 ENCOUNTER — NON-APPOINTMENT (OUTPATIENT)
Age: 84
End: 2024-12-11

## 2024-12-11 ENCOUNTER — APPOINTMENT (OUTPATIENT)
Dept: OPHTHALMOLOGY | Facility: CLINIC | Age: 84
End: 2024-12-11
Payer: MEDICARE

## 2024-12-11 PROCEDURE — 92012 INTRM OPH EXAM EST PATIENT: CPT

## 2025-01-08 ENCOUNTER — APPOINTMENT (OUTPATIENT)
Dept: OTOLARYNGOLOGY | Facility: CLINIC | Age: 85
End: 2025-01-08
Payer: MEDICARE

## 2025-01-08 ENCOUNTER — NON-APPOINTMENT (OUTPATIENT)
Age: 85
End: 2025-01-08

## 2025-01-08 DIAGNOSIS — H91.13 PRESBYCUSIS, BILATERAL: ICD-10-CM

## 2025-01-08 DIAGNOSIS — H61.23 IMPACTED CERUMEN, BILATERAL: ICD-10-CM

## 2025-01-08 DIAGNOSIS — H90.3 SENSORINEURAL HEARING LOSS, BILATERAL: ICD-10-CM

## 2025-01-08 PROCEDURE — 92567 TYMPANOMETRY: CPT

## 2025-01-08 PROCEDURE — 92557 COMPREHENSIVE HEARING TEST: CPT

## 2025-01-08 PROCEDURE — 69210 REMOVE IMPACTED EAR WAX UNI: CPT

## 2025-01-08 PROCEDURE — 99213 OFFICE O/P EST LOW 20 MIN: CPT | Mod: 25

## 2025-01-14 ENCOUNTER — NON-APPOINTMENT (OUTPATIENT)
Age: 85
End: 2025-01-14

## 2025-01-14 ENCOUNTER — APPOINTMENT (OUTPATIENT)
Dept: HEART AND VASCULAR | Facility: CLINIC | Age: 85
End: 2025-01-14
Payer: MEDICARE

## 2025-01-14 VITALS
TEMPERATURE: 97.3 F | DIASTOLIC BLOOD PRESSURE: 70 MMHG | OXYGEN SATURATION: 97 % | SYSTOLIC BLOOD PRESSURE: 150 MMHG | HEART RATE: 76 BPM | WEIGHT: 153 LBS | BODY MASS INDEX: 21.42 KG/M2 | HEIGHT: 71 IN

## 2025-01-14 VITALS — DIASTOLIC BLOOD PRESSURE: 70 MMHG | SYSTOLIC BLOOD PRESSURE: 139 MMHG

## 2025-01-14 DIAGNOSIS — E78.5 HYPERLIPIDEMIA, UNSPECIFIED: ICD-10-CM

## 2025-01-14 DIAGNOSIS — I35.0 NONRHEUMATIC AORTIC (VALVE) STENOSIS: ICD-10-CM

## 2025-01-14 DIAGNOSIS — I44.7 LEFT BUNDLE-BRANCH BLOCK, UNSPECIFIED: ICD-10-CM

## 2025-01-14 DIAGNOSIS — I48.0 PAROXYSMAL ATRIAL FIBRILLATION: ICD-10-CM

## 2025-01-14 DIAGNOSIS — I25.810 ATHEROSCLEROSIS OF CORONARY ARTERY BYPASS GRAFT(S) W/OUT ANGINA PECTORIS: ICD-10-CM

## 2025-01-14 PROCEDURE — 93000 ELECTROCARDIOGRAM COMPLETE: CPT

## 2025-01-14 PROCEDURE — 99214 OFFICE O/P EST MOD 30 MIN: CPT

## 2025-01-14 PROCEDURE — G2211 COMPLEX E/M VISIT ADD ON: CPT

## 2025-02-19 ENCOUNTER — APPOINTMENT (OUTPATIENT)
Dept: INTERNAL MEDICINE | Facility: CLINIC | Age: 85
End: 2025-02-19
Payer: MEDICARE

## 2025-02-19 VITALS
HEIGHT: 71 IN | DIASTOLIC BLOOD PRESSURE: 80 MMHG | WEIGHT: 154 LBS | SYSTOLIC BLOOD PRESSURE: 134 MMHG | OXYGEN SATURATION: 97 % | BODY MASS INDEX: 21.56 KG/M2 | HEART RATE: 57 BPM | TEMPERATURE: 97.6 F

## 2025-02-19 PROCEDURE — 99213 OFFICE O/P EST LOW 20 MIN: CPT

## 2025-02-19 RX ORDER — BENZONATATE 100 MG/1
100 CAPSULE ORAL
Qty: 30 | Refills: 0 | Status: ACTIVE | COMMUNITY
Start: 2025-02-19 | End: 1900-01-01

## 2025-02-26 ENCOUNTER — APPOINTMENT (OUTPATIENT)
Dept: INTERNAL MEDICINE | Facility: CLINIC | Age: 85
End: 2025-02-26
Payer: MEDICARE

## 2025-02-26 VITALS
WEIGHT: 154 LBS | SYSTOLIC BLOOD PRESSURE: 152 MMHG | HEART RATE: 112 BPM | DIASTOLIC BLOOD PRESSURE: 79 MMHG | OXYGEN SATURATION: 96 % | TEMPERATURE: 97.9 F | HEIGHT: 71 IN | BODY MASS INDEX: 21.56 KG/M2

## 2025-02-26 DIAGNOSIS — J06.9 ACUTE UPPER RESPIRATORY INFECTION, UNSPECIFIED: ICD-10-CM

## 2025-02-26 DIAGNOSIS — R05.9 COUGH, UNSPECIFIED: ICD-10-CM

## 2025-02-26 PROCEDURE — 99213 OFFICE O/P EST LOW 20 MIN: CPT

## 2025-02-26 RX ORDER — METHYLPREDNISOLONE 4 MG/1
4 TABLET ORAL
Qty: 1 | Refills: 1 | Status: ACTIVE | COMMUNITY
Start: 2025-02-26 | End: 1900-01-01

## 2025-03-06 ENCOUNTER — TRANSCRIPTION ENCOUNTER (OUTPATIENT)
Age: 85
End: 2025-03-06

## 2025-03-07 ENCOUNTER — TRANSCRIPTION ENCOUNTER (OUTPATIENT)
Age: 85
End: 2025-03-07

## 2025-03-19 ENCOUNTER — TRANSCRIPTION ENCOUNTER (OUTPATIENT)
Age: 85
End: 2025-03-19

## 2025-03-20 ENCOUNTER — TRANSCRIPTION ENCOUNTER (OUTPATIENT)
Age: 85
End: 2025-03-20

## 2025-04-01 ENCOUNTER — TRANSCRIPTION ENCOUNTER (OUTPATIENT)
Age: 85
End: 2025-04-01

## 2025-04-16 ENCOUNTER — APPOINTMENT (OUTPATIENT)
Dept: OPHTHALMOLOGY | Facility: CLINIC | Age: 85
End: 2025-04-16
Payer: MEDICARE

## 2025-04-16 ENCOUNTER — NON-APPOINTMENT (OUTPATIENT)
Age: 85
End: 2025-04-16

## 2025-04-16 PROCEDURE — 92133 CPTRZD OPH DX IMG PST SGM ON: CPT

## 2025-04-16 PROCEDURE — 92012 INTRM OPH EXAM EST PATIENT: CPT

## 2025-04-16 PROCEDURE — 92083 EXTENDED VISUAL FIELD XM: CPT

## 2025-05-01 ENCOUNTER — APPOINTMENT (OUTPATIENT)
Dept: INTERNAL MEDICINE | Facility: CLINIC | Age: 85
End: 2025-05-01
Payer: MEDICARE

## 2025-05-01 VITALS
HEIGHT: 71 IN | BODY MASS INDEX: 21 KG/M2 | SYSTOLIC BLOOD PRESSURE: 151 MMHG | HEART RATE: 56 BPM | DIASTOLIC BLOOD PRESSURE: 76 MMHG | WEIGHT: 150 LBS | OXYGEN SATURATION: 100 % | TEMPERATURE: 97.3 F

## 2025-05-01 DIAGNOSIS — I50.30 UNSPECIFIED DIASTOLIC (CONGESTIVE) HEART FAILURE: ICD-10-CM

## 2025-05-01 DIAGNOSIS — I35.0 NONRHEUMATIC AORTIC (VALVE) STENOSIS: ICD-10-CM

## 2025-05-01 DIAGNOSIS — I48.91 UNSPECIFIED ATRIAL FIBRILLATION: ICD-10-CM

## 2025-05-01 DIAGNOSIS — I25.810 ATHEROSCLEROSIS OF CORONARY ARTERY BYPASS GRAFT(S) W/OUT ANGINA PECTORIS: ICD-10-CM

## 2025-05-01 PROCEDURE — 99213 OFFICE O/P EST LOW 20 MIN: CPT

## 2025-06-24 ENCOUNTER — APPOINTMENT (OUTPATIENT)
Dept: DERMATOLOGY | Facility: CLINIC | Age: 85
End: 2025-06-24
Payer: MEDICARE

## 2025-06-24 PROBLEM — L57.8 DERMATOHELIOSIS: Status: ACTIVE | Noted: 2025-06-24

## 2025-06-24 PROBLEM — D22.60 MULTIPLE BENIGN NEVI OF UPPER EXTREMITY, LOWER EXTREMITY, AND TRUNK: Status: ACTIVE | Noted: 2025-06-24

## 2025-06-24 PROBLEM — L82.0 INFLAMED SEBORRHEIC KERATOSIS: Status: ACTIVE | Noted: 2025-06-24

## 2025-06-24 PROBLEM — L57.0 AK (ACTINIC KERATOSIS): Status: ACTIVE | Noted: 2025-06-24

## 2025-06-24 PROBLEM — L82.1 SK (SEBORRHEIC KERATOSIS): Status: ACTIVE | Noted: 2025-06-24

## 2025-06-24 PROCEDURE — 17003 DESTRUCT PREMALG LES 2-14: CPT | Mod: 59

## 2025-06-24 PROCEDURE — 17110 DESTRUCTION B9 LES UP TO 14: CPT

## 2025-06-24 PROCEDURE — 17000 DESTRUCT PREMALG LESION: CPT | Mod: 59

## 2025-06-24 PROCEDURE — 99203 OFFICE O/P NEW LOW 30 MIN: CPT | Mod: 25

## 2025-07-08 ENCOUNTER — APPOINTMENT (OUTPATIENT)
Dept: HEART AND VASCULAR | Facility: CLINIC | Age: 85
End: 2025-07-08
Payer: MEDICARE

## 2025-07-08 VITALS
HEIGHT: 71 IN | HEART RATE: 55 BPM | WEIGHT: 150 LBS | DIASTOLIC BLOOD PRESSURE: 70 MMHG | TEMPERATURE: 97.4 F | OXYGEN SATURATION: 98 % | BODY MASS INDEX: 21 KG/M2 | SYSTOLIC BLOOD PRESSURE: 132 MMHG

## 2025-07-08 PROCEDURE — 99214 OFFICE O/P EST MOD 30 MIN: CPT

## 2025-07-08 PROCEDURE — G2211 COMPLEX E/M VISIT ADD ON: CPT

## 2025-07-08 PROCEDURE — 93000 ELECTROCARDIOGRAM COMPLETE: CPT

## 2025-07-15 ENCOUNTER — APPOINTMENT (OUTPATIENT)
Dept: UROLOGY | Facility: CLINIC | Age: 85
End: 2025-07-15
Payer: MEDICARE

## 2025-07-15 VITALS
BODY MASS INDEX: 21 KG/M2 | HEART RATE: 54 BPM | TEMPERATURE: 95.4 F | WEIGHT: 150 LBS | SYSTOLIC BLOOD PRESSURE: 144 MMHG | DIASTOLIC BLOOD PRESSURE: 59 MMHG | HEIGHT: 71 IN

## 2025-07-15 PROBLEM — N52.9 ERECTILE DYSFUNCTION, UNSPECIFIED ERECTILE DYSFUNCTION TYPE: Status: ACTIVE | Noted: 2025-07-15

## 2025-07-15 PROCEDURE — 99214 OFFICE O/P EST MOD 30 MIN: CPT | Mod: 25

## 2025-07-15 PROCEDURE — 51798 US URINE CAPACITY MEASURE: CPT

## 2025-07-15 RX ORDER — SILDENAFIL 100 MG/1
100 TABLET, FILM COATED ORAL
Qty: 90 | Refills: 3 | Status: ACTIVE | COMMUNITY
Start: 2025-07-15 | End: 1900-01-01

## 2025-07-16 LAB
APPEARANCE: CLEAR
BACTERIA: NEGATIVE /HPF
BILIRUBIN URINE: NEGATIVE
BLOOD URINE: NEGATIVE
CAST: 0 /LPF
COLOR: YELLOW
EPITHELIAL CELLS: 0 /HPF
GLUCOSE QUALITATIVE U: NEGATIVE MG/DL
KETONES URINE: NEGATIVE MG/DL
LEUKOCYTE ESTERASE URINE: NEGATIVE
MICROSCOPIC-UA: NORMAL
NITRITE URINE: NEGATIVE
PH URINE: 7
PROTEIN URINE: NEGATIVE MG/DL
RED BLOOD CELLS URINE: 2 /HPF
SPECIFIC GRAVITY URINE: 1.01
UROBILINOGEN URINE: 1 MG/DL
WHITE BLOOD CELLS URINE: 0 /HPF

## 2025-07-17 LAB — BACTERIA UR CULT: NORMAL

## 2025-08-19 ENCOUNTER — APPOINTMENT (OUTPATIENT)
Dept: HEART AND VASCULAR | Facility: CLINIC | Age: 85
End: 2025-08-19
Payer: MEDICARE

## 2025-08-19 VITALS — SYSTOLIC BLOOD PRESSURE: 140 MMHG | DIASTOLIC BLOOD PRESSURE: 80 MMHG

## 2025-08-19 DIAGNOSIS — I34.0 NONRHEUMATIC MITRAL (VALVE) INSUFFICIENCY: ICD-10-CM

## 2025-08-19 PROCEDURE — 93306 TTE W/DOPPLER COMPLETE: CPT

## 2025-08-20 ENCOUNTER — APPOINTMENT (OUTPATIENT)
Dept: OPHTHALMOLOGY | Facility: CLINIC | Age: 85
End: 2025-08-20
Payer: MEDICARE

## 2025-08-20 ENCOUNTER — NON-APPOINTMENT (OUTPATIENT)
Age: 85
End: 2025-08-20

## 2025-08-20 PROCEDURE — 92012 INTRM OPH EXAM EST PATIENT: CPT

## 2025-09-08 ENCOUNTER — RX RENEWAL (OUTPATIENT)
Age: 85
End: 2025-09-08

## 2025-09-16 ENCOUNTER — APPOINTMENT (OUTPATIENT)
Dept: INTERNAL MEDICINE | Facility: CLINIC | Age: 85
End: 2025-09-16
Payer: MEDICARE

## 2025-09-16 VITALS
WEIGHT: 150 LBS | OXYGEN SATURATION: 96 % | HEIGHT: 71 IN | HEART RATE: 89 BPM | DIASTOLIC BLOOD PRESSURE: 72 MMHG | TEMPERATURE: 97.2 F | BODY MASS INDEX: 21 KG/M2 | SYSTOLIC BLOOD PRESSURE: 144 MMHG

## 2025-09-16 DIAGNOSIS — I25.810 ATHEROSCLEROSIS OF CORONARY ARTERY BYPASS GRAFT(S) W/OUT ANGINA PECTORIS: ICD-10-CM

## 2025-09-16 DIAGNOSIS — I35.0 NONRHEUMATIC AORTIC (VALVE) STENOSIS: ICD-10-CM

## 2025-09-16 DIAGNOSIS — R42 DIZZINESS AND GIDDINESS: ICD-10-CM

## 2025-09-16 DIAGNOSIS — I48.91 UNSPECIFIED ATRIAL FIBRILLATION: ICD-10-CM

## 2025-09-16 DIAGNOSIS — R35.1 BENIGN PROSTATIC HYPERPLASIA WITH LOWER URINARY TRACT SYMPMS: ICD-10-CM

## 2025-09-16 DIAGNOSIS — I25.10 ATHEROSCLEROTIC HEART DISEASE OF NATIVE CORONARY ARTERY W/OUT ANGINA PECTORIS: ICD-10-CM

## 2025-09-16 DIAGNOSIS — E78.5 HYPERLIPIDEMIA, UNSPECIFIED: ICD-10-CM

## 2025-09-16 DIAGNOSIS — N40.1 BENIGN PROSTATIC HYPERPLASIA WITH LOWER URINARY TRACT SYMPMS: ICD-10-CM

## 2025-09-16 DIAGNOSIS — Z00.00 ENCOUNTER FOR GENERAL ADULT MEDICAL EXAMINATION W/OUT ABNORMAL FINDINGS: ICD-10-CM

## 2025-09-16 PROCEDURE — G0439: CPT

## 2025-09-16 PROCEDURE — 36415 COLL VENOUS BLD VENIPUNCTURE: CPT

## 2025-09-23 LAB
25(OH)D3 SERPL-MCNC: 54.3 NG/ML
ALBUMIN SERPL ELPH-MCNC: 4.6 G/DL
ALP BLD-CCNC: 84 U/L
ALT SERPL-CCNC: 31 U/L
ANION GAP SERPL CALC-SCNC: 17 MMOL/L
APPEARANCE: CLEAR
AST SERPL-CCNC: 45 U/L
BACTERIA: NEGATIVE /HPF
BILIRUB SERPL-MCNC: 1.4 MG/DL
BILIRUBIN URINE: NEGATIVE
BLOOD URINE: NEGATIVE
BUN SERPL-MCNC: 17 MG/DL
CALCIUM SERPL-MCNC: 10.2 MG/DL
CAST: 0 /LPF
CHLORIDE SERPL-SCNC: 102 MMOL/L
CHOLEST SERPL-MCNC: 152 MG/DL
CO2 SERPL-SCNC: 21 MMOL/L
COLOR: YELLOW
CREAT SERPL-MCNC: 0.76 MG/DL
EGFRCR SERPLBLD CKD-EPI 2021: 90 ML/MIN/1.73M2
EPITHELIAL CELLS: 0 /HPF
ESTIMATED AVERAGE GLUCOSE: 123 MG/DL
GLUCOSE QUALITATIVE U: NEGATIVE MG/DL
GLUCOSE SERPL-MCNC: 73 MG/DL
HBA1C MFR BLD HPLC: 5.9 %
HCT VFR BLD CALC: 39 %
HDLC SERPL-MCNC: 79 MG/DL
HGB BLD-MCNC: 12.4 G/DL
KETONES URINE: NEGATIVE MG/DL
LDLC SERPL-MCNC: 63 MG/DL
LEUKOCYTE ESTERASE URINE: NEGATIVE
MCHC RBC-ENTMCNC: 31.6 PG
MCHC RBC-ENTMCNC: 31.8 G/DL
MCV RBC AUTO: 99.5 FL
MICROSCOPIC-UA: NORMAL
NITRITE URINE: NEGATIVE
NONHDLC SERPL-MCNC: 73 MG/DL
PH URINE: 7
PLATELET # BLD AUTO: 219 K/UL
POTASSIUM SERPL-SCNC: 4.9 MMOL/L
PROT SERPL-MCNC: 7.1 G/DL
PROTEIN URINE: NEGATIVE MG/DL
PSA SERPL-MCNC: 0.4 NG/ML
RBC # BLD: 3.92 M/UL
RBC # FLD: 13.5 %
RED BLOOD CELLS URINE: 1 /HPF
SODIUM SERPL-SCNC: 141 MMOL/L
SPECIFIC GRAVITY URINE: 1.01
T4 FREE SERPL-MCNC: 1.5 NG/DL
TRIGL SERPL-MCNC: 47 MG/DL
TSH SERPL-ACNC: 2.86 UIU/ML
UROBILINOGEN URINE: 0.2 MG/DL
WBC # FLD AUTO: 5.23 K/UL
WHITE BLOOD CELLS URINE: 0 /HPF